# Patient Record
Sex: FEMALE | Race: WHITE | NOT HISPANIC OR LATINO | Employment: FULL TIME | URBAN - METROPOLITAN AREA
[De-identification: names, ages, dates, MRNs, and addresses within clinical notes are randomized per-mention and may not be internally consistent; named-entity substitution may affect disease eponyms.]

---

## 2018-02-17 PROBLEM — Z00.00 WELL ADULT EXAM: Status: ACTIVE | Noted: 2018-02-17

## 2018-08-20 PROBLEM — G43.119 CLASSICAL MIGRAINE WITH INTRACTABLE MIGRAINE: Status: RESOLVED | Noted: 2017-05-23 | Resolved: 2018-08-20

## 2018-10-01 PROBLEM — M25.512 ACUTE PAIN OF BOTH SHOULDERS: Status: ACTIVE | Noted: 2018-10-01

## 2019-02-05 PROBLEM — L30.4 INTERTRIGO: Status: RESOLVED | Noted: 2018-03-28 | Resolved: 2019-02-05

## 2019-02-05 PROBLEM — F41.9 ANXIETY: Status: ACTIVE | Noted: 2019-02-05

## 2019-11-11 PROBLEM — M25.512 ACUTE PAIN OF BOTH SHOULDERS: Status: RESOLVED | Noted: 2018-10-01 | Resolved: 2019-11-11

## 2019-11-11 PROBLEM — F07.81 POST CONCUSSION SYNDROME: Status: ACTIVE | Noted: 2019-11-11

## 2022-09-10 ENCOUNTER — OFFICE VISIT (OUTPATIENT)
Dept: URGENT CARE | Facility: CLINIC | Age: 42
End: 2022-09-10
Payer: COMMERCIAL

## 2022-09-10 ENCOUNTER — HOSPITAL ENCOUNTER (EMERGENCY)
Facility: HOSPITAL | Age: 42
Discharge: HOME | End: 2022-09-10
Attending: FAMILY MEDICINE | Admitting: FAMILY MEDICINE
Payer: COMMERCIAL

## 2022-09-10 VITALS
OXYGEN SATURATION: 97 % | DIASTOLIC BLOOD PRESSURE: 69 MMHG | SYSTOLIC BLOOD PRESSURE: 134 MMHG | BODY MASS INDEX: 41.65 KG/M2 | HEIGHT: 65 IN | HEART RATE: 85 BPM | RESPIRATION RATE: 18 BRPM | TEMPERATURE: 98 F | WEIGHT: 250 LBS

## 2022-09-10 VITALS
HEART RATE: 69 BPM | DIASTOLIC BLOOD PRESSURE: 66 MMHG | SYSTOLIC BLOOD PRESSURE: 133 MMHG | RESPIRATION RATE: 16 BRPM | OXYGEN SATURATION: 96 %

## 2022-09-10 DIAGNOSIS — M79.604 RIGHT LEG PAIN: Primary | ICD-10-CM

## 2022-09-10 DIAGNOSIS — M79.604 PAIN AND SWELLING OF LOWER EXTREMITY, RIGHT: Primary | ICD-10-CM

## 2022-09-10 DIAGNOSIS — M79.89 PAIN AND SWELLING OF LOWER EXTREMITY, RIGHT: Primary | ICD-10-CM

## 2022-09-10 PROCEDURE — 99283 EMERGENCY DEPT VISIT LOW MDM: CPT

## 2022-09-10 PROCEDURE — 99283 EMERGENCY DEPT VISIT LOW MDM: CPT | Performed by: PHYSICIAN ASSISTANT

## 2022-09-10 PROCEDURE — G0382 LEV 3 HOSP TYPE B ED VISIT: HCPCS | Performed by: PHYSICIAN ASSISTANT

## 2022-09-10 PROCEDURE — S9083 URGENT CARE CENTER GLOBAL: HCPCS | Performed by: PHYSICIAN ASSISTANT

## 2022-09-10 RX ORDER — ACETAMINOPHEN 325 MG/1
TABLET ORAL
Status: DISPENSED
Start: 2022-09-10 | End: 2022-09-10

## 2022-09-10 NOTE — PROGRESS NOTES
[facility]        NAME: Yeimi Cardoso is a 38 y o  female  : 1980    MRN: [id number]  DATE: September 10, 2022  TIME: 12:29 PM    Assessment and Plan   Pain and swelling of lower extremity, right [M79 604, M79 89]  1  Pain and swelling of lower extremity, right         VISIT WAS COMPLETED DURING NETWORK OUTAGE  ALL PATIENT INSTRUCTIONS WERE HAND WRITTEN AND GIVEN TO PATIENT  COPIES WILL BE SCANNED INTO THE CHART  Patient Instructions       Follow up with PCP in 3-5 days  Proceed to  ER    Chief Complaint     Chief Complaint   Patient presents with    Foot Pain     Rt foot pain, swelling, redness, no known injury, x4 days         History of Present Illness       Reports 4 day history of worsening R foot/ankle swelling, redness, and pain worse with walking that radiates to the calf  She has been applying ice and taking tylenol and ibuprofen  Denies injury, cuts, or other breaks to the skin  Denies fevers or chills  Denies surgery in past 4 weeks, immobilization over 3 days, hemoptysis, prior DVT/PE, clotting disorder, estrogen supplements, hormonal birth control, or current CA diagnosis/tx  Denies PMH of gout  FH: gout  Review of Systems   Review of Systems   Constitutional: Negative for chills and fever  Musculoskeletal: Positive for gait problem (painful) and joint swelling  Skin: Positive for color change           Current Medications       Current Outpatient Medications:     Brexpiprazole (Rexulti) 1 MG tablet, Take 1 tablet (1 mg total) by mouth daily, Disp: 30 tablet, Rfl: 1    escitalopram (Lexapro) 20 mg tablet, Take 1 tablet (20 mg total) by mouth daily, Disp: 90 tablet, Rfl: 0    lisinopril (ZESTRIL) 10 mg tablet, take 1 tablet by mouth once daily, Disp: 90 tablet, Rfl: 1    omeprazole (PriLOSEC) 20 mg delayed release capsule, take 1 capsule by mouth once daily, Disp: 30 capsule, Rfl: 5    albuterol (Ventolin HFA) 90 mcg/act inhaler, Inhale 2 puffs every 6 (six) hours as needed for wheezing (Patient not taking: No sig reported), Disp: 18 g, Rfl: 5    ibuprofen (MOTRIN) 600 mg tablet, Take 1 tablet (600 mg total) by mouth every 6 (six) hours as needed for mild pain for up to 10 days (Patient not taking: Reported on 2022), Disp: 30 tablet, Rfl: 0    methocarbamol (ROBAXIN) 500 mg tablet, Take 1 tablet (500 mg total) by mouth 2 (two) times a day as needed for muscle spasms (Patient not taking: No sig reported), Disp: 20 tablet, Rfl: 0    naproxen (Naprosyn) 500 mg tablet, Take 1 tablet (500 mg total) by mouth 2 (two) times a day with meals (Patient not taking: No sig reported), Disp: 30 tablet, Rfl: 0    ondansetron (ZOFRAN) 4 mg tablet, Take 1 tablet (4 mg total) by mouth every 6 (six) hours (Patient not taking: No sig reported), Disp: 12 tablet, Rfl: 0    psyllium (METAMUCIL SMOOTH TEXTURE) 28 % packet, Take 1 packet by mouth 2 (two) times a day (Patient not taking: Reported on 2022), Disp: 60 packet, Rfl: 1    traZODone (DESYREL) 50 mg tablet, Take 50 mg by mouth daily at bedtime (Patient not taking: No sig reported), Disp: , Rfl:     Current Allergies     Allergies as of 09/10/2022    (No Known Allergies)            The following portions of the patient's history were reviewed and updated as appropriate: allergies, current medications, past family history, past medical history, past social history, past surgical history and problem list      Past Medical History:   Diagnosis Date    Anxiety     Asthma     Complete spontaneous  without complication     FIVE TIMES    Concussion     Depression     Headache     Hypertension     Insomnia     Normal delivery     DAUGHTER    Normal delivery     SON    Pelvic inflammatory disease     RESOLVED: 16    Post concussion syndrome 2019    Recurrent pregnancy loss (CODE)     Thyroid nodule 3/2/2019       Past Surgical History:   Procedure Laterality Date    CHOLECYSTECTOMY      CYSTOSCOPY N/A 12/28/2016    Procedure: Mohini Mixer;  Surgeon: Ina Lopez MD;  [facility];  Service:     HYSTERECTOMY      LAPAROSCOPIC CHOLECYSTECTOMY      RESOLVED: 2006    NASAL SEPTUM SURGERY      REESOLVED: 1999; DEVIATION    AK LAPAROSCOPY W TOT HYSTERECT UTERUS 250 GRAM OR LESS N/A 12/28/2016    Procedure: HYSTERECTOMY LAPAROSCOPIC TOTAL ;  Surgeon: Ina Lopez MD;  [facility];  Service: Gynecology    SALPINGECTOMY Bilateral 12/28/2016    Procedure: SALPINGECTOMY;  Surgeon: Ina Lopez MD;  [facility];  Service:     TONSILLECTOMY      RESOLVED: 1997    TUBAL LIGATION      RESOLVED: 1/22/14       Family History   Problem Relation Age of Onset    Venous thrombosis Mother         ACUTE; OF DEEP VESSELS OF THE DISTAL LOWER EXTREMITY    COPD Mother     Heart disease Mother     Hypertension Mother     Mental illness Mother     Kidney disease Maternal Grandmother     Breast cancer Paternal Grandmother 36    Diabetes Paternal Grandfather         MELLITUS    Colon cancer Father 61    No Known Problems Daughter     No Known Problems Maternal Grandfather     No Known Problems Maternal Aunt     No Known Problems Maternal Aunt     No Known Problems Son          Medications have been verified  Objective   /69   Pulse 85   Temp 98 °F (36 7 °C)   Resp 18   Ht 5' 5" (1 651 m)   Wt 113 kg (250 lb)   LMP 12/01/2016   SpO2 97%   BMI 41 60 kg/m²   Patient's last menstrual period was 12/01/2016  Physical Exam     Physical Exam  Vitals reviewed  Constitutional:       General: She is not in acute distress  Appearance: She is well-developed  Cardiovascular:      Rate and Rhythm: Normal rate and regular rhythm  Heart sounds: Normal heart sounds  No murmur heard  No friction rub  No gallop  Pulmonary:      Effort: Pulmonary effort is normal  No respiratory distress  Breath sounds: Normal breath sounds  No wheezing or rales     Chest:      Chest wall: No tenderness  Musculoskeletal:         General: Swelling and tenderness present  No deformity  Normal range of motion  Comments: Diffuse R foot/ankle swelling, redness and TTP  R calf TTP without erythema or swelling  Skin:     General: Skin is warm  Findings: Erythema present  No lesion  Neurological:      Mental Status: She is alert and oriented to person, place, and time  Deep Tendon Reflexes: Reflexes are normal and symmetric  Psychiatric:         Behavior: Behavior normal          Thought Content:  Thought content normal          Judgment: Judgment normal

## 2022-09-20 ENCOUNTER — TELEPHONE (OUTPATIENT)
Dept: INTERNAL MEDICINE CLINIC | Facility: CLINIC | Age: 42
End: 2022-09-20

## 2022-09-20 DIAGNOSIS — B34.9 VIRAL INFECTION, UNSPECIFIED: ICD-10-CM

## 2022-09-20 DIAGNOSIS — Z20.822 EXPOSURE TO COVID-19 VIRUS: ICD-10-CM

## 2022-09-20 NOTE — TELEPHONE ENCOUNTER
Called pt to have her come in for covid swab and get her put on the nurse schedule  No answer  LM for her to call back and come in today for swab

## 2022-09-20 NOTE — TELEPHONE ENCOUNTER
Regarding: FW: Covid19  Please place pt on nurse schedule and let her know she can come today and call from parking lot, thanks  ----- Message -----  From: Shayla Salmeron  Sent: 9/20/2022  11:10 AM EDT  To: Beatriz Miner PA-C  Subject: [id number]                                          ----- Message from Shayla Salmeron sent at 9/20/2022 11:10 AM EDT -----       ----- Message from Alejandra Rebolledo to Beatriz Miner PA-C sent at 9/20/2022 11:09 AM -----   Yes I would   can I come in any time      ----- Message -----       From:Reba Mckinley PA-C       Sent:9/20/2022 10:16 AM EDT         To:Alejandra Cope    Subject:Covid19    Eamon Mosqueda  We recommend coming to our office for the testing instead of the urgent care, would you be agreeable to that?       ----- Message -----       From:Alejandra Cope       Sent:9/19/2022  6:45 PM EDT         To:Reba Mckinley PA-C    Subject:Covid19    I haven't been feeling that great for a few days   sore throat very tired and I was around my mom  She just tested positive today for covid  Can u put an order in for me to get tested so I can go to the walk in and get tested?

## 2022-09-21 LAB — SARS-COV-2 RNA RESP QL NAA+PROBE: NEGATIVE

## 2022-09-21 PROCEDURE — U0003 INFECTIOUS AGENT DETECTION BY NUCLEIC ACID (DNA OR RNA); SEVERE ACUTE RESPIRATORY SYNDROME CORONAVIRUS 2 (SARS-COV-2) (CORONAVIRUS DISEASE [COVID-19]), AMPLIFIED PROBE TECHNIQUE, MAKING USE OF HIGH THROUGHPUT TECHNOLOGIES AS DESCRIBED BY CMS-2020-01-R: HCPCS | Performed by: PHYSICIAN ASSISTANT

## 2022-09-21 PROCEDURE — U0005 INFEC AGEN DETEC AMPLI PROBE: HCPCS | Performed by: PHYSICIAN ASSISTANT

## 2022-10-24 ENCOUNTER — HOSPITAL ENCOUNTER (EMERGENCY)
Facility: HOSPITAL | Age: 42
Discharge: HOME | End: 2022-10-24
Attending: EMERGENCY MEDICINE
Payer: COMMERCIAL

## 2022-10-24 ENCOUNTER — APPOINTMENT (EMERGENCY)
Dept: RADIOLOGY | Facility: HOSPITAL | Age: 42
End: 2022-10-24
Payer: COMMERCIAL

## 2022-10-24 ENCOUNTER — APPOINTMENT (EMERGENCY)
Dept: NON INVASIVE DIAGNOSTICS | Facility: HOSPITAL | Age: 42
End: 2022-10-24
Payer: COMMERCIAL

## 2022-10-24 VITALS
SYSTOLIC BLOOD PRESSURE: 158 MMHG | RESPIRATION RATE: 18 BRPM | OXYGEN SATURATION: 98 % | DIASTOLIC BLOOD PRESSURE: 82 MMHG | HEART RATE: 76 BPM | TEMPERATURE: 97.9 F | WEIGHT: 250 LBS | HEIGHT: 65 IN | BODY MASS INDEX: 41.65 KG/M2

## 2022-10-24 DIAGNOSIS — M25.561 ACUTE PAIN OF RIGHT KNEE: Primary | ICD-10-CM

## 2022-10-24 PROCEDURE — 73564 X-RAY EXAM KNEE 4 OR MORE: CPT

## 2022-10-24 PROCEDURE — 93971 EXTREMITY STUDY: CPT

## 2022-10-24 PROCEDURE — 93971 EXTREMITY STUDY: CPT | Performed by: SURGERY

## 2022-10-24 NOTE — ED PROVIDER NOTES
History  Chief Complaint   Patient presents with   • Leg Pain     Pt reports ongoing leg pain for 2 days     39 yo F presents to the ED for right leg pain that has been worsening over the past 3d  She states that she injured it at work 3 days ago when lifting a resident  She works as a CNA at a nursing home  She doesn't remember exactly what happened, but remembers that her knee started hurting after lifting the patient  She has never inured that knee before  She has tried tylenol and motrin for the pain with little to no relief  No hx of blood clots  She states that the pain worsens with knee flexion and extension  She is able to ambulate but with a limp  She offers no other complaints at this time  Denies chest pain, SOB, abd pain, bladder/bowel incontinence, back pain, dysuria, headache, dizziness  She localizes her pain to the back of her right knee and describes it as a "cramping" sensation  History provided by:  Patient   used: No    Leg Pain  Location:  Knee  Time since incident:  3 days  Knee location:  R knee  Pain details:     Quality:  Aching and cramping    Radiates to:  Does not radiate    Severity:  Moderate    Onset quality:  Sudden    Duration:  3 days    Timing:  Constant    Progression:  Worsening  Chronicity:  New  Dislocation: no    Foreign body present:  No foreign bodies  Prior injury to area:  No  Relieved by:  Nothing  Worsened by:  Nothing  Ineffective treatments:  Acetaminophen  Associated symptoms: no back pain, no decreased ROM, no fever, no numbness, no swelling and no tingling        Prior to Admission Medications   Prescriptions Last Dose Informant Patient Reported? Taking?    Brexpiprazole (Rexulti) 1 MG tablet   No No   Sig: Take 1 tablet (1 mg total) by mouth daily   albuterol (Ventolin HFA) 90 mcg/act inhaler   No No   Sig: Inhale 2 puffs every 6 (six) hours as needed for wheezing   Patient not taking: No sig reported   escitalopram (Lexapro) 20 mg tablet No No   Sig: Take 1 tablet (20 mg total) by mouth daily   ibuprofen (MOTRIN) 600 mg tablet   No No   Sig: Take 1 tablet (600 mg total) by mouth every 6 (six) hours as needed for mild pain for up to 10 days   Patient not taking: Reported on 2022   lisinopril (ZESTRIL) 10 mg tablet   No No   Sig: take 1 tablet by mouth once daily   methocarbamol (ROBAXIN) 500 mg tablet   No No   Sig: Take 1 tablet (500 mg total) by mouth 2 (two) times a day as needed for muscle spasms   Patient not taking: No sig reported   naproxen (Naprosyn) 500 mg tablet   No No   Sig: Take 1 tablet (500 mg total) by mouth 2 (two) times a day with meals   Patient not taking: No sig reported   omeprazole (PriLOSEC) 20 mg delayed release capsule   No No   Sig: take 1 capsule by mouth once daily   ondansetron (ZOFRAN) 4 mg tablet   No No   Sig: Take 1 tablet (4 mg total) by mouth every 6 (six) hours   Patient not taking: No sig reported   psyllium (METAMUCIL SMOOTH TEXTURE) 28 % packet   No No   Sig: Take 1 packet by mouth 2 (two) times a day   Patient not taking: Reported on 2022   traZODone (DESYREL) 50 mg tablet   Yes No   Sig: Take 50 mg by mouth daily at bedtime   Patient not taking: No sig reported      Facility-Administered Medications: None       Past Medical History:   Diagnosis Date   • Anxiety    • Asthma    • Complete spontaneous  without complication     FIVE TIMES   • Concussion    • Depression    • Headache    • Hypertension    • Insomnia    • Normal delivery     DAUGHTER   • Normal delivery     SON   • Pelvic inflammatory disease     RESOLVED: 16   • Post concussion syndrome 2019   • Recurrent pregnancy loss (CODE)    • Thyroid nodule 3/2/2019       Past Surgical History:   Procedure Laterality Date   • CHOLECYSTECTOMY     • CYSTOSCOPY N/A 2016    Procedure: CYSTOSCOPY;  Surgeon:  Iliana Rosenbaum MD;  [facility];  Service:    • HYSTERECTOMY     • LAPAROSCOPIC CHOLECYSTECTOMY      RESOLVED:    • NASAL SEPTUM SURGERY      REESOLVED: 1999; DEVIATION   • DE LAPAROSCOPY W TOT HYSTERECT UTERUS 250 GRAM OR LESS N/A 12/28/2016    Procedure: HYSTERECTOMY LAPAROSCOPIC TOTAL ;  Surgeon: Sally Riley MD;  [facility];  Service: Gynecology   • SALPINGECTOMY Bilateral 12/28/2016    Procedure: SALPINGECTOMY;  Surgeon: Sally Riley MD;  [facility];  Service:    • TONSILLECTOMY      RESOLVED: 1997   • TUBAL LIGATION      RESOLVED: 1/22/14       Family History   Problem Relation Age of Onset   • Venous thrombosis Mother         ACUTE; OF DEEP VESSELS OF THE DISTAL LOWER EXTREMITY   • COPD Mother    • Heart disease Mother    • Hypertension Mother    • Mental illness Mother    • Kidney disease Maternal Grandmother    • Breast cancer Paternal Grandmother 36   • Diabetes Paternal Grandfather         MELLITUS   • Colon cancer Father 61   • No Known Problems Daughter    • No Known Problems Maternal Grandfather    • No Known Problems Maternal Aunt    • No Known Problems Maternal Aunt    • No Known Problems Son      I have reviewed and agree with the history as documented  E-Cigarette/Vaping   • E-Cigarette Use Never User      E-Cigarette/Vaping Substances   • Nicotine No    • THC No    • CBD No    • Flavoring No    • Other No    • Unknown No      Social History     Tobacco Use   • Smoking status: Former Smoker     Types: Cigars   • Smokeless tobacco: Never Used   Vaping Use   • Vaping Use: Never used   Substance Use Topics   • Alcohol use: Yes     Comment: socially   • Drug use: No       Review of Systems   Constitutional: Negative for chills and fever  HENT: Negative for ear pain and sore throat  Eyes: Negative for pain and visual disturbance  Respiratory: Negative for cough and shortness of breath  Cardiovascular: Negative for chest pain and palpitations  Gastrointestinal: Negative for abdominal pain and vomiting  Genitourinary: Negative for dysuria and hematuria     Musculoskeletal: Positive for arthralgias  Negative for back pain  Skin: Negative for color change and rash  Neurological: Negative for seizures and syncope  All other systems reviewed and are negative  Physical Exam  Physical Exam  Vitals and nursing note reviewed  Constitutional:       General: She is not in acute distress  Appearance: Normal appearance  She is well-developed and normal weight  HENT:      Head: Normocephalic and atraumatic  Right Ear: External ear normal       Left Ear: External ear normal       Nose: Nose normal       Mouth/Throat:      Mouth: Mucous membranes are moist       Pharynx: Oropharynx is clear  Eyes:      General: No scleral icterus  Right eye: No discharge  Left eye: No discharge  Conjunctiva/sclera: Conjunctivae normal    Cardiovascular:      Rate and Rhythm: Normal rate and regular rhythm  Pulses: Normal pulses  Heart sounds: Normal heart sounds  No murmur heard  Pulmonary:      Effort: Pulmonary effort is normal  No respiratory distress  Breath sounds: Normal breath sounds  Abdominal:      General: Abdomen is flat  Palpations: Abdomen is soft  Tenderness: There is no abdominal tenderness  There is no right CVA tenderness or left CVA tenderness  Musculoskeletal:         General: Tenderness present  Normal range of motion  Cervical back: Neck supple  Right lower leg: No edema  Left lower leg: No edema  Comments: TTP over the posterior aspect of R knee  No bruising or signs of trauma  No significant swelling to the RLE  No deformity noted  Passive ROM Wnl  Neurovascularly intact proximal and distal to the affected site  Skin:     General: Skin is warm and dry  Neurological:      General: No focal deficit present  Mental Status: She is alert and oriented to person, place, and time  Mental status is at baseline  Motor: No weakness     Psychiatric:         Mood and Affect: Mood normal          Behavior: Behavior normal          Thought Content: Thought content normal          Vital Signs  ED Triage Vitals [10/24/22 1029]   Temperature Pulse Respirations Blood Pressure SpO2   97 9 °F (36 6 °C) 76 18 158/82 99 %      Temp src Heart Rate Source Patient Position - Orthostatic VS BP Location FiO2 (%)   -- Monitor Lying Right arm --      Pain Score       8           Vitals:    10/24/22 1029   BP: 158/82   Pulse: 76   Patient Position - Orthostatic VS: Lying         Visual Acuity      ED Medications  Medications - No data to display    Diagnostic Studies  Results Reviewed     None                 VAS lower limb venous duplex study, unilateral/limited   Final Result by Yesenia Suárez MD (10/24 1401)      XR knee 4+ views Right injury   ED Interpretation by Rosa Georges PA-C (10/24 1235)   No acute osseous abnormality      Final Result by Cristina Zavala MD (10/24 1418)      No acute osseous abnormality  Findings are stable      Workstation performed: [id number]                    Procedures  Procedures         ED Course                                             MDM  Number of Diagnoses or Management Options  Acute pain of right knee: new and requires workup  Diagnosis management comments: 37 yo F presents for evaluation of Right knee pain that has worsened over the past 3 days  TTP over the back of R knee  No appreciable swelling or signs of trauma  Duplex ordered to r/o DVT with questionable trauma/injury to R knee  XR shows no acute osseous abnormality  Duplex negative for DVT  Likely contusion vs ligament injury  Will give knee immobilizer and crutches and recommend weight bearing as tolerated  Gave referral for ortho  Patient verbalizes understanding and is amenable to discharge  Strict return precautions discussed  Delay in care due to critical case in the ED          Amount and/or Complexity of Data Reviewed  Tests in the radiology section of CPT®: ordered and reviewed  Review and summarize past medical records: yes  Independent visualization of images, tracings, or specimens: yes    Patient Progress  Patient progress: stable      Disposition  Final diagnoses:   Acute pain of right knee     Time reflects when diagnosis was documented in both MDM as applicable and the Disposition within this note     Time User Action Codes Description Comment    10/24/2022 12:59 PM Mia Rosario Add [A05 [id number]] Acute pain of right knee       ED Disposition     ED Disposition   Discharge    Condition   Stable    Date/Time   Mon Oct 24, 2022 12:59 PM    Comment   Alejandra Cope discharge to home/self care                 Follow-up Information     Follow up With Specialties Details Why Contact Info Additional Information    Kenyetta Nichols PA-C Family Medicine, Internal Medicine, Physician Assistant Call in 3 days follow up for further evaluation of symptoms [address] Emergency Department Emergency Medicine Go to  If symptoms worsen [address] [phone]  [facility] Emergency Department, [address], [facility] Specialists Rafael calvin Orthopedic Surgery Schedule an appointment as soon as possible for a visit  follow up for further evaluation of symptoms [address] [facility] [address] [place] [id number]  [address] [place] [address] Specialists Rafael calvin, [address], [place], [place] [address]          Discharge Medication List as of 10/24/2022  1:01 PM      CONTINUE these medications which have NOT CHANGED    Details   albuterol (Ventolin HFA) 90 mcg/act inhaler Inhale 2 puffs every 6 (six) hours as needed for wheezing, Starting Wed 12/22/2021, Normal      Brexpiprazole (Rexulti) 1 MG tablet Take 1 tablet (1 mg total) by mouth daily, Starting ANTHONY Garrett 8/24/2022, Normal      escitalopram (Lexapro) 20 mg tablet Take 1 tablet (20 mg total) by mouth daily, Starting Wed 4/13/2022, Normal      ibuprofen (MOTRIN) 600 mg tablet Take 1 tablet (600 mg total) by mouth every 6 (six) hours as needed for mild pain for up to 10 days, Starting Mon 6/14/2021, Until Fri 4/1/2022 at 2359, Normal      lisinopril (ZESTRIL) 10 mg tablet take 1 tablet by mouth once daily, Normal      methocarbamol (ROBAXIN) 500 mg tablet Take 1 tablet (500 mg total) by mouth 2 (two) times a day as needed for muscle spasms, Starting Mon 7/11/2022, Normal      naproxen (Naprosyn) 500 mg tablet Take 1 tablet (500 mg total) by mouth 2 (two) times a day with meals, Starting Mon 7/11/2022, Normal      omeprazole (PriLOSEC) 20 mg delayed release capsule take 1 capsule by mouth once daily, Normal      ondansetron (ZOFRAN) 4 mg tablet Take 1 tablet (4 mg total) by mouth every 6 (six) hours, Starting Mon 4/25/2022, Normal      psyllium (METAMUCIL SMOOTH TEXTURE) 28 % packet Take 1 packet by mouth 2 (two) times a day, Starting Thu 3/10/2022, Until Sat 4/9/2022, Normal      traZODone (DESYREL) 50 mg tablet Take 50 mg by mouth daily at bedtime, Historical Med             No discharge procedures on file      PDMP Review     None          ED Provider  Electronically Signed by           Vincent Gutierrez PA-C  10/24/22 5025

## 2022-10-24 NOTE — Clinical Note
Patel Pederson was seen and treated in our emergency department on 10/24/2022  Diagnosis:     Alejandra  may return to work on return date, may return to school on return date  She may return on this date: 10/25/2022    Pt is required to be non-weightbearing until re-evaluation     If you have any questions or concerns, please don't hesitate to call        Josue Silva PA-C    ______________________________           _______________          _______________  Hospital Representative                              Date                                Time

## 2022-10-25 ENCOUNTER — APPOINTMENT (OUTPATIENT)
Dept: URGENT CARE | Facility: CLINIC | Age: 42
End: 2022-10-25
Payer: OTHER MISCELLANEOUS

## 2022-10-25 PROCEDURE — 99213 OFFICE O/P EST LOW 20 MIN: CPT

## 2022-10-31 ENCOUNTER — OCCMED (OUTPATIENT)
Dept: URGENT CARE | Facility: CLINIC | Age: 42
End: 2022-10-31

## 2022-10-31 DIAGNOSIS — M25.561 ACUTE PAIN OF RIGHT KNEE: Primary | ICD-10-CM

## 2022-11-06 DIAGNOSIS — I10 ESSENTIAL HYPERTENSION: ICD-10-CM

## 2022-11-07 RX ORDER — LISINOPRIL 10 MG/1
10 TABLET ORAL DAILY
Qty: 90 TABLET | Refills: 0 | Status: SHIPPED | OUTPATIENT
Start: 2022-11-07 | End: 2022-11-16

## 2022-11-09 ENCOUNTER — APPOINTMENT (OUTPATIENT)
Dept: URGENT CARE | Facility: CLINIC | Age: 42
End: 2022-11-09

## 2022-11-14 ENCOUNTER — OFFICE VISIT (OUTPATIENT)
Dept: OBGYN CLINIC | Facility: CLINIC | Age: 42
End: 2022-11-14

## 2022-11-14 VITALS
WEIGHT: 260 LBS | BODY MASS INDEX: 43.32 KG/M2 | HEIGHT: 65 IN | SYSTOLIC BLOOD PRESSURE: 132 MMHG | HEART RATE: 69 BPM | DIASTOLIC BLOOD PRESSURE: 83 MMHG

## 2022-11-14 DIAGNOSIS — S76.311A HAMSTRING STRAIN, RIGHT, INITIAL ENCOUNTER: Primary | ICD-10-CM

## 2022-11-14 NOTE — LETTER
November 14, 2022     Patient: Jerson Walker  YOB: 1980  Date of Visit: 11/14/2022      To Whom it May Concern:    Daria Harris is under my professional care  Alejandra was seen in my office on 11/14/2022  Alejandra is cleared to return to work related responsibilities beginning 11/14/2022 with the following restrictions:    Light duty work restrictions will remain in place  Limit use of stairways, step stools, ladders, or other level changing equipment  No transfer of patients    Will be re-evaluated in 4 weeks time, any new restrictions will be outlined at that time    If you have any questions or concerns, please don't hesitate to call           Sincerely,          Rama Mendoza DO        CC: No Recipients

## 2022-11-14 NOTE — PROGRESS NOTES
Assessment/Plan:   Diagnoses and all orders for this visit:    Hamstring strain, right, initial encounter  -     Ambulatory referral to Physical Therapy; Future    Discussed with patient that today's physical exam and review of recent x-rays and MRI are consistent with medial hamstring strain  Discussed with patient that she will likely benefit from formal physical therapy to work on pain reduction, strengthening, and stretching  A referral has been provided to this effect  She can continue Tylenol OTC and Voltaren gel as needed for pain and soreness  She can continue light duty work restriction, a note has been provided to this effect  She will be seen for follow-up in 4 weeks for re-evaluation  Patient expresses understanding is in agreement with this treatment plan  Clinically, the patient has a medial side hamstring strain of her right knee  The MRI was negative  Would recommend initiation of therapy for range of motion, strengthening, stretching  She may work with restrictions  Follow up 1 month  Subjective:   Patient ID: Anel Cazares  1980     HPI  Patient is a 38 y o  female who presents for initial evaluation of work related right knee injury sustained 10/21/2022  Patient currently is employed by a [employer], and states that 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  She states that her weight was beared through her right lower extremity, when she felt/heard a pop and had mild pain in the posterior aspect of the knee  She was able to complete her tasks at that time, but she says that an hour later, she began experiencing more intense pain in the posterior aspect of the right knee  She was evaluated a few days later at her local [facility] ED on 10/24/2022, and was seen by occupational medicine on 10/31/2022  Aside from knees instances, she has had no formal treatment in regards to this issue    On today's presentation she describes her pain as bowling mostly posterior and medial   She describes her pain as a feeling of tightness/burning pain  She reports only mild swelling, but denies any associated bruising, numbness, tingling, or feelings of instability  Her pain is increased with prolonged weight-bearing  She is currently taking Tylenol for pain, and is using Voltaren gel for pain relief as well  Patient does have a past medical history which includes right knee strain from 2021  The following portions of the patient's history were reviewed and updated as appropriate:  Past medical history, past surgical history, Family history, social history, current medications and allergies    Past Medical History:   Diagnosis Date   • Anxiety    • Asthma    • Complete spontaneous  without complication     FIVE TIMES   • Concussion    • Depression    • Headache    • Hypertension    • Insomnia    • Normal delivery     DAUGHTER   • Normal delivery     SON   • Pelvic inflammatory disease     RESOLVED: 16   • Post concussion syndrome 2019   • Recurrent pregnancy loss (CODE)    • Thyroid nodule 3/2/2019       Past Surgical History:   Procedure Laterality Date   • CHOLECYSTECTOMY     • CYSTOSCOPY N/A 2016    Procedure: CYSTOSCOPY;  Surgeon: Melissa Alba MD;  [facility];  Service:    • HYSTERECTOMY     • LAPAROSCOPIC CHOLECYSTECTOMY      RESOLVED:    • NASAL SEPTUM SURGERY      REESOLVED: ; DEVIATION   • TX LAPAROSCOPY W TOT HYSTERECT UTERUS 250 GRAM OR LESS N/A 2016    Procedure: HYSTERECTOMY LAPAROSCOPIC TOTAL ;  Surgeon: Melissa Alba MD;  [facility];  Service: Gynecology   • SALPINGECTOMY Bilateral 2016    Procedure: SALPINGECTOMY;  Surgeon:  Melissa Alba MD;  [facility];  Service:    • TONSILLECTOMY      RESOLVED:    • TUBAL LIGATION      RESOLVED: 14       Family History   Problem Relation Age of Onset   • Venous thrombosis Mother         ACUTE; OF DEEP VESSELS OF THE DISTAL LOWER EXTREMITY   • COPD Mother    • Heart disease Mother    • Hypertension Mother    • Mental illness Mother    • Kidney disease Maternal Grandmother    • Breast cancer Paternal Grandmother 36   • Diabetes Paternal Grandfather         MELLITUS   • Colon cancer Father 61   • No Known Problems Daughter    • No Known Problems Maternal Grandfather    • No Known Problems Maternal Aunt    • No Known Problems Maternal Aunt    • No Known Problems Son        Social History     Socioeconomic History   • Marital status: /Civil Union     Spouse name: None   • Number of children: None   • Years of education: None   • Highest education level: None   Occupational History   • Occupation: employed   Tobacco Use   • Smoking status: Former Smoker     Types: Cigars   • Smokeless tobacco: Never Used   Vaping Use   • Vaping Use: Never used   Substance and Sexual Activity   • Alcohol use: Yes     Comment: socially   • Drug use: No   • Sexual activity: Yes     Partners: Male     Birth control/protection: None   Other Topics Concern   • None   Social History Narrative    [employer] USE        Employed Full Time- [employer] -      Social Determinants of Health     Financial Resource Strain: Not on file   Food Insecurity: Not on file   Transportation Needs: Not on file   Physical Activity: Not on file   Stress: Not on file   Social Connections: Not on file   Intimate Partner Violence: Not on file   Housing Stability: Not on file         Current Outpatient Medications:   •  albuterol (Ventolin HFA) 90 mcg/act inhaler, Inhale 2 puffs every 6 (six) hours as needed for wheezing, Disp: 18 g, Rfl: 5  •  Brexpiprazole (Rexulti) 1 MG tablet, Take 1 tablet (1 mg total) by mouth daily, Disp: 30 tablet, Rfl: 1  •  escitalopram (Lexapro) 20 mg tablet, Take 1 tablet (20 mg total) by mouth daily, Disp: 90 tablet, Rfl: 0  •  lisinopril (ZESTRIL) 10 mg tablet, take 1 tablet by mouth once daily, Disp: 30 tablet, Rfl: 0  •  omeprazole (PriLOSEC) 20 mg delayed release capsule, take 1 capsule by mouth once daily, Disp: 30 capsule, Rfl: 5  •  lisinopril (ZESTRIL) 10 mg tablet, Take 1 tablet (10 mg total) by mouth daily (Patient not taking: Reported on 11/14/2022), Disp: 90 tablet, Rfl: 0  •  methocarbamol (ROBAXIN) 500 mg tablet, Take 1 tablet (500 mg total) by mouth 2 (two) times a day as needed for muscle spasms (Patient not taking: No sig reported), Disp: 20 tablet, Rfl: 0  •  naproxen (Naprosyn) 500 mg tablet, Take 1 tablet (500 mg total) by mouth 2 (two) times a day with meals (Patient not taking: No sig reported), Disp: 30 tablet, Rfl: 0  •  ondansetron (ZOFRAN) 4 mg tablet, Take 1 tablet (4 mg total) by mouth every 6 (six) hours (Patient not taking: No sig reported), Disp: 12 tablet, Rfl: 0  •  psyllium (METAMUCIL SMOOTH TEXTURE) 28 % packet, Take 1 packet by mouth 2 (two) times a day (Patient not taking: Reported on 8/17/2022), Disp: 60 packet, Rfl: 1  •  traZODone (DESYREL) 50 mg tablet, Take 50 mg by mouth daily at bedtime (Patient not taking: No sig reported), Disp: , Rfl:     No Known Allergies    Review of Systems   Constitutional: Negative for chills, fever and unexpected weight change  HENT: Negative for hearing loss, nosebleeds and sore throat  Eyes: Negative for pain, redness and visual disturbance  Respiratory: Negative for cough, shortness of breath and wheezing  Cardiovascular: Negative for chest pain, palpitations and leg swelling  Gastrointestinal: Negative for abdominal pain, nausea and vomiting  Endocrine: Negative for polydipsia and polyuria  Genitourinary: Negative for dysuria and hematuria  Musculoskeletal:        As noted in HPI   Skin: Negative for rash and wound  Neurological: Negative for dizziness, numbness and headaches  Psychiatric/Behavioral: Negative for decreased concentration and suicidal ideas  The patient is not nervous/anxious           Objective:  /83 Pulse 69   Ht 5' 5" (1 651 m)   Wt 118 kg (260 lb)   LMP 12/01/2016   BMI 43 27 kg/m²     Ortho Exam  Right knee -   Patient ambulates with steady gait pattern  Uses no assistive device  No anatomical deformity  Skin is warm and dry to touch with no signs of erythema, ecchymosis, infection  No soft tissue swelling, no effusion noted  ROM 0°-125°  TTP over distal medial hamstring, nontender to palpation over medial or lateral joint lines  Flexor and extensor mechanisms intact  Knee is stable to varus and valgus stress  - Lachman's  - Anterior Drawer, - Posterior Drawer  - medial Oscar's, - lateral Oscar's  - Pivot Shift  Patella tracks centrally without palpable crepitus  Calf compartments are soft and supple  2+ TP and DP pulses with brisk capillary refill to the toes  Sural, saphenous, tibial, superficial and deep peroneal motor and sensory distributions intact  Sensation light touch intact distally    Physical Exam  Vitals and nursing note reviewed  Constitutional:       General: She is not in acute distress  Appearance: She is well-developed  HENT:      Head: Normocephalic and atraumatic  Eyes:      Conjunctiva/sclera: Conjunctivae normal    Cardiovascular:      Rate and Rhythm: Normal rate  Pulmonary:      Effort: Pulmonary effort is normal    Musculoskeletal:      Cervical back: Neck supple  Skin:     General: Skin is warm and dry  Capillary Refill: Capillary refill takes less than 2 seconds  Neurological:      Mental Status: She is alert and oriented to person, place, and time  Psychiatric:         Mood and Affect: Mood normal          Behavior: Behavior normal           Diagnostic Test Review:  Attending Physician has personally reviewed pertinent imaging in PACS, impression is as follows:    Review of radiographic series taken 10/24/2022 of the right knee shows no sign of acute osseous abnormality or malalignment      Review of MRI series performed 11/1/2022 of the right knee shows tendinosis of the distal medial hamstring tendons as well as small, benign parameniscal cyst    Procedures   No procedures performed this visit    Scribe Attestation    I,:  Joe Fong am acting as a scribe while in the presence of the attending physician :       I,:  Silvia Lock, DO personally performed the services described in this documentation    as scribed in my presence :

## 2022-11-16 ENCOUNTER — OFFICE VISIT (OUTPATIENT)
Dept: INTERNAL MEDICINE CLINIC | Facility: CLINIC | Age: 42
End: 2022-11-16

## 2022-11-16 VITALS
DIASTOLIC BLOOD PRESSURE: 68 MMHG | HEART RATE: 75 BPM | WEIGHT: 259.5 LBS | BODY MASS INDEX: 44.3 KG/M2 | SYSTOLIC BLOOD PRESSURE: 148 MMHG | OXYGEN SATURATION: 99 % | HEIGHT: 64 IN | TEMPERATURE: 97.7 F

## 2022-11-16 DIAGNOSIS — E66.01 CLASS 3 SEVERE OBESITY DUE TO EXCESS CALORIES WITHOUT SERIOUS COMORBIDITY WITH BODY MASS INDEX (BMI) OF 40.0 TO 44.9 IN ADULT (HCC): Primary | ICD-10-CM

## 2022-11-16 DIAGNOSIS — F51.01 PRIMARY INSOMNIA: ICD-10-CM

## 2022-11-16 DIAGNOSIS — Z12.31 SCREENING MAMMOGRAM FOR BREAST CANCER: ICD-10-CM

## 2022-11-16 DIAGNOSIS — Z13.0 SCREENING FOR DEFICIENCY ANEMIA: ICD-10-CM

## 2022-11-16 DIAGNOSIS — E55.9 VITAMIN D DEFICIENCY: ICD-10-CM

## 2022-11-16 DIAGNOSIS — Z13.29 SCREENING FOR THYROID DISORDER: ICD-10-CM

## 2022-11-16 DIAGNOSIS — Z13.1 SCREENING FOR DIABETES MELLITUS: ICD-10-CM

## 2022-11-16 DIAGNOSIS — Z23 NEED FOR VACCINATION: ICD-10-CM

## 2022-11-16 DIAGNOSIS — Z13.220 SCREENING, LIPID: ICD-10-CM

## 2022-11-16 PROBLEM — R07.9 CHEST PAIN: Status: RESOLVED | Noted: 2019-03-02 | Resolved: 2022-11-16

## 2022-11-16 PROBLEM — F33.42 RECURRENT MAJOR DEPRESSIVE DISORDER, IN FULL REMISSION (HCC): Status: RESOLVED | Noted: 2019-02-05 | Resolved: 2022-11-16

## 2022-11-16 RX ORDER — TRAZODONE HYDROCHLORIDE 50 MG/1
50 TABLET ORAL
Qty: 30 TABLET | Refills: 0 | Status: SHIPPED | OUTPATIENT
Start: 2022-11-16

## 2022-11-16 RX ORDER — BUPROPION HYDROCHLORIDE 150 MG/1
150 TABLET ORAL EVERY MORNING
Qty: 30 TABLET | Refills: 5 | Status: SHIPPED | OUTPATIENT
Start: 2022-11-16

## 2022-11-17 PROBLEM — E66.813 CLASS 3 SEVERE OBESITY DUE TO EXCESS CALORIES WITHOUT SERIOUS COMORBIDITY WITH BODY MASS INDEX (BMI) OF 40.0 TO 44.9 IN ADULT (HCC): Status: ACTIVE | Noted: 2022-11-17

## 2022-11-17 PROBLEM — E66.01 CLASS 3 SEVERE OBESITY DUE TO EXCESS CALORIES WITHOUT SERIOUS COMORBIDITY WITH BODY MASS INDEX (BMI) OF 40.0 TO 44.9 IN ADULT (HCC): Status: ACTIVE | Noted: 2022-11-17

## 2022-11-23 ENCOUNTER — APPOINTMENT (OUTPATIENT)
Dept: URGENT CARE | Facility: CLINIC | Age: 42
End: 2022-11-23

## 2022-12-05 ENCOUNTER — OFFICE VISIT (OUTPATIENT)
Dept: URGENT CARE | Facility: CLINIC | Age: 42
End: 2022-12-05

## 2022-12-05 VITALS
DIASTOLIC BLOOD PRESSURE: 84 MMHG | WEIGHT: 259 LBS | HEART RATE: 100 BPM | OXYGEN SATURATION: 98 % | HEIGHT: 64 IN | TEMPERATURE: 97.9 F | BODY MASS INDEX: 44.22 KG/M2 | RESPIRATION RATE: 16 BRPM | SYSTOLIC BLOOD PRESSURE: 128 MMHG

## 2022-12-05 DIAGNOSIS — B34.9 VIRAL SYNDROME: Primary | ICD-10-CM

## 2022-12-05 DIAGNOSIS — R05.1 ACUTE COUGH: ICD-10-CM

## 2022-12-05 NOTE — PROGRESS NOTES
[facility]        NAME: Anthony Khan is a 38 y o  female  : 1980    MRN: [id number]  DATE: 2022  TIME: 8:18 AM    Assessment and Plan   Viral syndrome [B34 9]  1  Viral syndrome        2  Acute cough  Cov/Flu-Collected at [facility]            Patient Instructions     Vitamin D3 2000 IU daily  Vitamin C 1000mg twice per day  Multivitamin daily  Fluids and rest  Over the counter cold medication as needed  Follow up with PCP in 3-5 days  Proceed to  ER if symptoms worsen  Chief Complaint     Chief Complaint   Patient presents with   • Cough     Chills , cough, headache, body aches started a week ago         History of Present Illness       URI   This is a new problem  The current episode started in the past 7 days  The problem has been gradually worsening (Saturday)  The maximum temperature recorded prior to her arrival was 100 4 - 100 9 F  Associated symptoms include congestion, coughing, headaches and a sore throat  Pertinent negatives include no abdominal pain, diarrhea, ear pain, nausea, rash, rhinorrhea, sinus pain, sneezing, vomiting or wheezing  Treatments tried: DayQuil, tylenol  Review of Systems   Review of Systems   Constitutional: Positive for chills, fatigue and fever  HENT: Positive for congestion and sore throat  Negative for ear discharge, ear pain, postnasal drip, rhinorrhea, sinus pressure, sinus pain, sneezing and trouble swallowing  Respiratory: Positive for cough  Negative for chest tightness, shortness of breath and wheezing  Gastrointestinal: Negative for abdominal pain, diarrhea, nausea and vomiting  Musculoskeletal: Positive for myalgias  Skin: Negative for rash  Neurological: Positive for headaches  Negative for light-headedness           Current Medications       Current Outpatient Medications:   •  albuterol (Ventolin HFA) 90 mcg/act inhaler, Inhale 2 puffs every 6 (six) hours as needed for wheezing, Disp: 18 g, Rfl: 5  • buPROPion (Wellbutrin XL) 150 mg 24 hr tablet, Take 1 tablet (150 mg total) by mouth every morning, Disp: 30 tablet, Rfl: 5  •  lisinopril (ZESTRIL) 10 mg tablet, take 1 tablet by mouth once daily, Disp: 30 tablet, Rfl: 0  •  omeprazole (PriLOSEC) 20 mg delayed release capsule, take 1 capsule by mouth once daily, Disp: 30 capsule, Rfl: 5  •  traZODone (DESYREL) 50 mg tablet, Take 1 tablet (50 mg total) by mouth daily at bedtime, Disp: 30 tablet, Rfl: 0    Current Allergies     Allergies as of 2022   • (No Known Allergies)            The following portions of the patient's history were reviewed and updated as appropriate: allergies, current medications, past family history, past medical history, past social history, past surgical history and problem list      Past Medical History:   Diagnosis Date   • Anxiety    • Asthma    • Complete spontaneous  without complication     FIVE TIMES   • Concussion    • Depression    • Headache    • Hypertension    • Insomnia    • Normal delivery     DAUGHTER   • Normal delivery     SON   • Pelvic inflammatory disease     RESOLVED: 16   • Post concussion syndrome 2019   • Recurrent pregnancy loss (CODE)    • Thyroid nodule 3/2/2019       Past Surgical History:   Procedure Laterality Date   • CHOLECYSTECTOMY     • CYSTOSCOPY N/A 2016    Procedure: Annamarie Prader;  Surgeon: Kathy James MD;  [facility];  Service:    • HYSTERECTOMY     • LAPAROSCOPIC CHOLECYSTECTOMY      RESOLVED:    • NASAL SEPTUM SURGERY      REESOLVED: ; DEVIATION   • TN LAPAROSCOPY W TOT HYSTERECT UTERUS 250 GRAM OR LESS N/A 2016    Procedure: HYSTERECTOMY LAPAROSCOPIC TOTAL ;  Surgeon: Kathy James MD;  [facility];  Service: Gynecology   • SALPINGECTOMY Bilateral 2016    Procedure: SALPINGECTOMY;  Surgeon:  Kathy James MD;  [facility];  Service:    • TONSILLECTOMY      RESOLVED:    • TUBAL LIGATION      RESOLVED: 14       Family History Problem Relation Age of Onset   • Venous thrombosis Mother         ACUTE; OF DEEP VESSELS OF THE DISTAL LOWER EXTREMITY   • COPD Mother    • Heart disease Mother    • Hypertension Mother    • Mental illness Mother    • Kidney disease Maternal Grandmother    • Breast cancer Paternal Grandmother 36   • Diabetes Paternal Grandfather         MELLITUS   • Colon cancer Father 61   • No Known Problems Daughter    • No Known Problems Maternal Grandfather    • No Known Problems Maternal Aunt    • No Known Problems Maternal Aunt    • No Known Problems Son          Medications have been verified  Objective   /84   Pulse 100   Temp 97 9 °F (36 6 °C)   Resp 16   Ht 5' 4" (1 626 m)   Wt 117 kg (259 lb)   LMP 12/01/2016   SpO2 98%   BMI 44 46 kg/m²   Patient's last menstrual period was 12/01/2016  Physical Exam     Physical Exam  Vitals reviewed  Constitutional:       General: She is not in acute distress  Appearance: She is well-developed and well-nourished  She is not diaphoretic  HENT:      Head: Normocephalic  Right Ear: Tympanic membrane and external ear normal       Left Ear: Tympanic membrane and external ear normal       Nose: Nose normal       Mouth/Throat:      Mouth: Oropharynx is clear and moist       Pharynx: No oropharyngeal exudate or posterior oropharyngeal erythema  Eyes:      Conjunctiva/sclera: Conjunctivae normal    Cardiovascular:      Rate and Rhythm: Normal rate and regular rhythm  Pulses: Intact distal pulses  Heart sounds: Normal heart sounds  No murmur heard  No friction rub  No gallop  Pulmonary:      Effort: Pulmonary effort is normal  No respiratory distress  Breath sounds: Normal breath sounds  No wheezing, rhonchi or rales  Lymphadenopathy:      Cervical: No cervical adenopathy  Skin:     General: Skin is warm  Neurological:      Mental Status: She is alert and oriented to person, place, and time     Psychiatric:         Mood and Affect: Mood and affect normal          Behavior: Behavior normal          Thought Content:  Thought content normal          Judgment: Judgment normal

## 2022-12-05 NOTE — LETTER
December 5, 2022     Patient: Carol Rene   YOB: 1980   Date of Visit: 12/5/2022       To Whom It May Concern: It is my medical opinion that Kenya Odell may return if COVID test is negative, symptoms are improving and patient has been fever free for 24 hours without the use of a fever reducing agent  If COVID test is positive, patient may return on 12/8/2022 and when fever free for 24 hours without the use of a fever reducing agent  Upon return, the patient must then adhere to strict masking for an additional 5 days  If you have any questions or concerns, please don't hesitate to call           Sincerely,        Nick Lewis PA-C    CC: No Recipients

## 2022-12-05 NOTE — PATIENT INSTRUCTIONS
Vitamin D3 2000 IU daily  Vitamin C 1000mg twice per day  Multivitamin daily  Fluids and rest  Over the counter cold medication as needed  Follow up with PCP in 3-5 days  Proceed to  ER if symptoms worsen  [address]    Your healthcare provider and/or public health staff have evaluated you and have determined that you do not need to remain in the hospital at this time  At this time you can be isolated at home where you will be monitored by staff from your local or state health department  You should carefully follow the prevention and isolation steps below until a healthcare provider or local or state health department says that you can return to your normal activities  Stay home except to get medical care    People who are mildly ill with COVID-19 are able to isolate at home during their illness  You should restrict activities outside your home, except for getting medical care  Do not go to work, school, or public areas  Avoid using public transportation, ride-sharing, or taxis  Separate yourself from other people and animals in your home    People: As much as possible, you should stay in a specific room and away from other people in your home  Also, you should use a separate bathroom, if available  Animals: You should restrict contact with pets and other animals while you are sick with COVID-19, just like you would around other people  Although there have not been reports of pets or other animals becoming sick with COVID-19, it is still recommended that people sick with COVID-19 limit contact with animals until more information is known about the virus  When possible, have another member of your household care for your animals while you are sick  If you are sick with COVID-19, avoid contact with your pet, including petting, snuggling, being kissed or licked, and sharing food   If you must care for your pet or be around animals while you are sick, wash your hands before and after you interact with pets and wear a facemask  See COVID-19 and Animals for more information  Call ahead before visiting your doctor    If you have a medical appointment, call the healthcare provider and tell them that you have or may have COVID-19  This will help the healthcare provider’s office take steps to keep other people from getting infected or exposed  Wear a facemask    You should wear a facemask when you are around other people (e g , sharing a room or vehicle) or pets and before you enter a healthcare provider’s office  If you are not able to wear a facemask (for example, because it causes trouble breathing), then people who live with you should not stay in the same room with you, or they should wear a facemask if they enter your room  Cover your coughs and sneezes    Cover your mouth and nose with a tissue when you cough or sneeze  Throw used tissues in a lined trash can  Immediately wash your hands with soap and water for at least 20 seconds or, if soap and water are not available, clean your hands with an alcohol-based hand  that contains at least 60% alcohol  Clean your hands often    Wash your hands often with soap and water for at least 20 seconds, especially after blowing your nose, coughing, or sneezing; going to the bathroom; and before eating or preparing food  If soap and water are not readily available, use an alcohol-based hand  with at least 60% alcohol, covering all surfaces of your hands and rubbing them together until they feel dry  Soap and water are the best option if hands are visibly dirty  Avoid touching your eyes, nose, and mouth with unwashed hands  Avoid sharing personal household items    You should not share dishes, drinking glasses, cups, eating utensils, towels, or bedding with other people or pets in your home  After using these items, they should be washed thoroughly with soap and water      Clean all “high-touch” surfaces everyday    High touch surfaces include counters, tabletops, doorknobs, bathroom fixtures, toilets, phones, keyboards, tablets, and bedside tables  Also, clean any surfaces that may have blood, stool, or body fluids on them  Use a household cleaning spray or wipe, according to the label instructions  Labels contain instructions for safe and effective use of the cleaning product including precautions you should take when applying the product, such as wearing gloves and making sure you have good ventilation during use of the product  Monitor your symptoms    Seek prompt medical attention if your illness is worsening (e g , difficulty breathing)  Before seeking care, call your healthcare provider and tell them that you have, or are being evaluated for, COVID-19  Put on a facemask before you enter the facility  These steps will help the healthcare provider’s office to keep other people in the office or waiting room from getting infected or exposed  Ask your healthcare provider to call the local or [facility] health department  Persons who are placed under active monitoring or facilitated self-monitoring should follow instructions provided by their local health department or occupational health professionals, as appropriate  If you have a medical emergency and need to call 911, notify the dispatch personnel that you have, or are being evaluated for COVID-19  If possible, put on a facemask before emergency medical services arrive  Discontinuing home isolation    Patients with confirmed COVID-19 should remain under home isolation precautions until the following conditions are met:    They have had no fever for at least 24 hours (that is one full day of no fever without the use medicine that reduces fevers)  AND  other symptoms have improved (for example, when their cough or shortness of breath have improved)  AND  If had mild or moderate illness, at least 10 days have passed since their symptoms first appeared or if severe illness (needed oxygen) or immunosuppressed, at least 20 days have passed since symptoms first appeared  Patients with confirmed COVID-19 should also notify close contacts (including their workplace) and ask that they self-quarantine  Currently, close contact is defined as being within 6 feet for 15 minutes or more from the period 24 hours starting 48 hours before symptom onset to the time at which the patient went into isolation  Close contacts of patients diagnosed with COVID-19 should be instructed by the patient to self-quarantine for 14 days from the last time of their last contact with the patient       Source: [employer]

## 2022-12-06 ENCOUNTER — TELEPHONE (OUTPATIENT)
Dept: URGENT CARE | Facility: CLINIC | Age: 42
End: 2022-12-06

## 2022-12-06 LAB
FLUAV RNA RESP QL NAA+PROBE: NEGATIVE
FLUBV RNA RESP QL NAA+PROBE: NEGATIVE
SARS-COV-2 RNA RESP QL NAA+PROBE: POSITIVE

## 2022-12-08 DIAGNOSIS — I10 ESSENTIAL HYPERTENSION: ICD-10-CM

## 2022-12-08 RX ORDER — LISINOPRIL 10 MG/1
TABLET ORAL
Qty: 30 TABLET | Refills: 0 | Status: SHIPPED | OUTPATIENT
Start: 2022-12-08

## 2022-12-16 ENCOUNTER — OFFICE VISIT (OUTPATIENT)
Dept: OBGYN CLINIC | Facility: CLINIC | Age: 42
End: 2022-12-16

## 2022-12-16 ENCOUNTER — APPOINTMENT (OUTPATIENT)
Dept: URGENT CARE | Facility: CLINIC | Age: 42
End: 2022-12-16

## 2022-12-16 VITALS
WEIGHT: 259 LBS | SYSTOLIC BLOOD PRESSURE: 123 MMHG | BODY MASS INDEX: 44.22 KG/M2 | HEIGHT: 64 IN | HEART RATE: 88 BPM | DIASTOLIC BLOOD PRESSURE: 81 MMHG

## 2022-12-16 DIAGNOSIS — S76.311A HAMSTRING STRAIN, RIGHT, INITIAL ENCOUNTER: Primary | ICD-10-CM

## 2022-12-16 NOTE — PROGRESS NOTES
Assessment/Plan:    No problem-specific Assessment & Plan notes found for this encounter  Diagnoses and all orders for this visit:    Hamstring strain, right, initial encounter          The patient is doing quite well  She offers no complaints of pain  Strength and motion in tact  Continue home exercise program   Follow up on as-needed basis  If her condition changes, she will not hesitate to let us know  She was released to go back to work full and unlimited duty    Subjective:      Patient ID: Dea Lay is a 38 y o  female  HPI    The patient has a history of a hamstring strain of her right lower extremity  She offers no complaints of pain  She was discharged from physical therapy  She continues to perform home exercise program   She is pleased with her results  She desires to go back to work full duty    The following portions of the patient's history were reviewed and updated as appropriate: allergies, current medications, past family history, past medical history, past social history, past surgical history and problem list     Review of Systems   Constitutional: Negative for chills, fever and unexpected weight change  HENT: Negative for hearing loss, nosebleeds and sore throat  Eyes: Negative for pain, redness and visual disturbance  Respiratory: Negative for cough, shortness of breath and wheezing  Cardiovascular: Negative for chest pain, palpitations and leg swelling  Gastrointestinal: Negative for abdominal pain, nausea and vomiting  Endocrine: Negative for polydipsia and polyuria  Genitourinary: Negative for dysuria and hematuria  Musculoskeletal: Negative for arthralgias, back pain, gait problem, joint swelling, myalgias, neck pain and neck stiffness  As noted in HPI   Skin: Negative for rash and wound  Neurological: Negative for dizziness, numbness and headaches  Psychiatric/Behavioral: Negative for decreased concentration and suicidal ideas   The patient is not nervous/anxious  Objective:      /81 (BP Location: Left arm, Patient Position: Sitting, Cuff Size: Large)   Pulse 88   Ht 5' 4" (1 626 m)   Wt 117 kg (259 lb)   LMP 12/01/2016   BMI 44 46 kg/m²          Physical Exam      Right lower extremity is neurovascular intact  Toes are pink and mobile  Compartments are soft  There is full strength full motion along her right knee  There is a negative Lachman's, drawer, pivot shift test   There is no medial or lateral instability  There is no medial or lateral joint tenderness  No hamstring contractures present  No hamstring pain    Strength intact

## 2022-12-16 NOTE — LETTER
December 16, 2022     Patient: Yeimi Cardoso  YOB: 1980  Date of Visit: 12/16/2022      To Whom it May Concern:    Jose Rabago is under my professional care  Alejandra was seen in my office on 12/16/2022  Alejandra may return to work on December 16, 2022 without restrictions  If you have any questions or concerns, please don't hesitate to call  Sincerely,          Bakari Stevenson DO        CC: Alejandra Lubin

## 2022-12-31 DIAGNOSIS — I10 ESSENTIAL HYPERTENSION: ICD-10-CM

## 2023-01-03 RX ORDER — LISINOPRIL 10 MG/1
10 TABLET ORAL DAILY
Qty: 30 TABLET | Refills: 5 | Status: SHIPPED | OUTPATIENT
Start: 2023-01-03

## 2023-01-04 ENCOUNTER — LAB (OUTPATIENT)
Dept: LAB | Facility: CLINIC | Age: 43
End: 2023-01-04

## 2023-01-04 DIAGNOSIS — Z13.29 SCREENING FOR THYROID DISORDER: ICD-10-CM

## 2023-01-04 DIAGNOSIS — Z13.0 SCREENING FOR DEFICIENCY ANEMIA: ICD-10-CM

## 2023-01-04 DIAGNOSIS — Z13.1 SCREENING FOR DIABETES MELLITUS: ICD-10-CM

## 2023-01-04 DIAGNOSIS — E55.9 VITAMIN D DEFICIENCY: ICD-10-CM

## 2023-01-04 DIAGNOSIS — Z13.220 SCREENING, LIPID: ICD-10-CM

## 2023-01-04 LAB
25(OH)D3 SERPL-MCNC: 17.5 NG/ML (ref 30–100)
ALBUMIN SERPL BCP-MCNC: 3.4 G/DL (ref 3.5–5)
ALP SERPL-CCNC: 71 U/L (ref 46–116)
ALT SERPL W P-5'-P-CCNC: 36 U/L (ref 12–78)
ANION GAP SERPL CALCULATED.3IONS-SCNC: 8 MMOL/L (ref 4–13)
AST SERPL W P-5'-P-CCNC: 20 U/L (ref 5–45)
BASOPHILS # BLD AUTO: 0.07 THOUSANDS/ÂΜL (ref 0–0.1)
BASOPHILS NFR BLD AUTO: 1 % (ref 0–1)
BILIRUB SERPL-MCNC: 0.64 MG/DL (ref 0.2–1)
BUN SERPL-MCNC: 12 MG/DL (ref 5–25)
CALCIUM ALBUM COR SERPL-MCNC: 9.1 MG/DL (ref 8.3–10.1)
CALCIUM SERPL-MCNC: 8.6 MG/DL (ref 8.3–10.1)
CHLORIDE SERPL-SCNC: 107 MMOL/L (ref 96–108)
CHOLEST SERPL-MCNC: 165 MG/DL
CO2 SERPL-SCNC: 24 MMOL/L (ref 21–32)
CREAT SERPL-MCNC: 0.93 MG/DL (ref 0.6–1.3)
EOSINOPHIL # BLD AUTO: 0.2 THOUSAND/ÂΜL (ref 0–0.61)
EOSINOPHIL NFR BLD AUTO: 3 % (ref 0–6)
ERYTHROCYTE [DISTWIDTH] IN BLOOD BY AUTOMATED COUNT: 12.2 % (ref 11.6–15.1)
GFR SERPL CREATININE-BSD FRML MDRD: 77 ML/MIN/1.73SQ M
GLUCOSE P FAST SERPL-MCNC: 105 MG/DL (ref 65–99)
HCT VFR BLD AUTO: 39.9 % (ref 34.8–46.1)
HDLC SERPL-MCNC: 52 MG/DL
HGB BLD-MCNC: 13.7 G/DL (ref 11.5–15.4)
IMM GRANULOCYTES # BLD AUTO: 0.01 THOUSAND/UL (ref 0–0.2)
IMM GRANULOCYTES NFR BLD AUTO: 0 % (ref 0–2)
LDLC SERPL CALC-MCNC: 80 MG/DL (ref 0–100)
LYMPHOCYTES # BLD AUTO: 2.63 THOUSANDS/ÂΜL (ref 0.6–4.47)
LYMPHOCYTES NFR BLD AUTO: 39 % (ref 14–44)
MCH RBC QN AUTO: 31.6 PG (ref 26.8–34.3)
MCHC RBC AUTO-ENTMCNC: 34.3 G/DL (ref 31.4–37.4)
MCV RBC AUTO: 92 FL (ref 82–98)
MONOCYTES # BLD AUTO: 0.4 THOUSAND/ÂΜL (ref 0.17–1.22)
MONOCYTES NFR BLD AUTO: 6 % (ref 4–12)
NEUTROPHILS # BLD AUTO: 3.49 THOUSANDS/ÂΜL (ref 1.85–7.62)
NEUTS SEG NFR BLD AUTO: 51 % (ref 43–75)
NONHDLC SERPL-MCNC: 113 MG/DL
NRBC BLD AUTO-RTO: 0 /100 WBCS
PLATELET # BLD AUTO: 272 THOUSANDS/UL (ref 149–390)
PMV BLD AUTO: 9.9 FL (ref 8.9–12.7)
POTASSIUM SERPL-SCNC: 3.3 MMOL/L (ref 3.5–5.3)
PROT SERPL-MCNC: 6.9 G/DL (ref 6.4–8.4)
RBC # BLD AUTO: 4.34 MILLION/UL (ref 3.81–5.12)
SODIUM SERPL-SCNC: 139 MMOL/L (ref 135–147)
TRIGL SERPL-MCNC: 165 MG/DL
TSH SERPL DL<=0.05 MIU/L-ACNC: 2.87 UIU/ML (ref 0.45–4.5)
WBC # BLD AUTO: 6.8 THOUSAND/UL (ref 4.31–10.16)

## 2023-01-09 NOTE — RESULT ENCOUNTER NOTE
Eamon Roberts  I reviewed your labs and all looked good except for low vitamin D  I would recommend taking vitamin d3 5000iu daily   Have a great day!   -Isaac Silva

## 2023-01-18 DIAGNOSIS — F51.01 PRIMARY INSOMNIA: ICD-10-CM

## 2023-01-18 RX ORDER — TRAZODONE HYDROCHLORIDE 50 MG/1
50 TABLET ORAL
Qty: 30 TABLET | Refills: 0 | Status: SHIPPED | OUTPATIENT
Start: 2023-01-18

## 2023-02-01 ENCOUNTER — APPOINTMENT (EMERGENCY)
Dept: RADIOLOGY | Facility: HOSPITAL | Age: 43
End: 2023-02-01

## 2023-02-01 ENCOUNTER — APPOINTMENT (EMERGENCY)
Dept: CT IMAGING | Facility: HOSPITAL | Age: 43
End: 2023-02-01

## 2023-02-01 ENCOUNTER — HOSPITAL ENCOUNTER (EMERGENCY)
Facility: HOSPITAL | Age: 43
Discharge: HOME | End: 2023-02-02
Attending: EMERGENCY MEDICINE

## 2023-02-01 VITALS
DIASTOLIC BLOOD PRESSURE: 82 MMHG | HEART RATE: 91 BPM | TEMPERATURE: 98.2 F | OXYGEN SATURATION: 96 % | RESPIRATION RATE: 18 BRPM | SYSTOLIC BLOOD PRESSURE: 120 MMHG

## 2023-02-01 DIAGNOSIS — M54.50 LOW BACK PAIN WITHOUT SCIATICA, UNSPECIFIED BACK PAIN LATERALITY, UNSPECIFIED CHRONICITY: Primary | ICD-10-CM

## 2023-02-01 DIAGNOSIS — K21.00 GASTROESOPHAGEAL REFLUX DISEASE WITH ESOPHAGITIS: ICD-10-CM

## 2023-02-01 DIAGNOSIS — E04.1 THYROID NODULE: ICD-10-CM
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
## 2023-02-01 DIAGNOSIS — M54.9 BACK PAIN: Primary | ICD-10-CM

## 2023-02-01 DIAGNOSIS — M25.511 RIGHT SHOULDER PAIN: ICD-10-CM

## 2023-02-01 RX ORDER — KETOROLAC TROMETHAMINE 30 MG/ML
30 INJECTION, SOLUTION INTRAMUSCULAR; INTRAVENOUS ONCE
Status: COMPLETED | OUTPATIENT
Start: 2023-02-01 | End: 2023-02-01

## 2023-02-01 RX ORDER — CYCLOBENZAPRINE HCL 10 MG
10 TABLET ORAL 3 TIMES DAILY PRN
Qty: 30 TABLET | Refills: 0 | Status: SHIPPED | OUTPATIENT
Start: 2023-02-01

## 2023-02-01 RX ORDER — ACETAMINOPHEN 325 MG/1
975 TABLET ORAL ONCE
Status: COMPLETED | OUTPATIENT
Start: 2023-02-01 | End: 2023-02-01

## 2023-02-01 RX ORDER — OMEPRAZOLE 20 MG/1
CAPSULE, DELAYED RELEASE ORAL
Qty: 30 CAPSULE | Refills: 5 | Status: SHIPPED | OUTPATIENT
Start: 2023-02-01

## 2023-02-01 RX ADMIN — ACETAMINOPHEN 975 MG: 325 TABLET ORAL at 22:10

## 2023-02-01 RX ADMIN — KETOROLAC TROMETHAMINE 30 MG: 30 INJECTION, SOLUTION INTRAMUSCULAR at 22:10

## 2023-02-01 NOTE — Clinical Note
Lex Covarrubias was seen and treated in our emergency department on 2/1/2023     ?    ? ? Diagnosis: ?    Alejandra  may return to work on return date  She may return on this date: 02/06/2023    ? If you have any questions or concerns, please don't hesitate to call        Issac Savage MD    ______________________________           _______________          _______________  Hospital Representative                              Date                                Time

## 2023-02-02 ENCOUNTER — NURSE TRIAGE (OUTPATIENT)
Dept: PHYSICAL THERAPY | Facility: OTHER | Age: 43
End: 2023-02-02

## 2023-02-02 DIAGNOSIS — M54.50 ACUTE MIDLINE LOW BACK PAIN, UNSPECIFIED WHETHER SCIATICA PRESENT: Primary | ICD-10-CM

## 2023-02-02 NOTE — TELEPHONE ENCOUNTER
Additional Information  • Negative: Is this related to a work injury? • Negative: Is this related to an MVA? • Negative: Are you currently recieving homecare services? • Negative: Has the patient had unexplained weight loss? • Negative: Does the patient have a fever? • Negative: Is the patient experiencing blood in sputum? • Negative: Is the patient experiencing urine retention? • Negative: Is the patient experiencing acute drop foot or paralysis? • Negative: Has the patient experienced major trauma? (fall from height, high speed collision, direct blow to spine) and is also experiencing nausea, light-headedness, or loss of consciousness? • Negative: Is this a chronic condition? Background - Initial Assessment  Clinical complaint: midline low back pain that can "go down R buttock" and RLE  Date of onset: Fall in shower yesterday 2/1/23- Warranted ED visit  Frequency of pain: constant  Quality of pain: sharp and shooting    Protocols used: [facility] COMPREHENSIVE SPINE PROGRAM PROTOCOL    Patient seen in ED yesterday/today-PLEASE SEE NOTES WHEN COMPLETE  This RN did review the Comprehensive Spine Program in detail and what we offer for her back or neck pain  Patient stated she has a minor fall on Friday and fall in shower yesterday  Patient is agreeable to triage and would like to proceed w/ Evaluation/Sessions with [facility] therapist  Patient to discuss appropriate tx/plan of care and other necessary interventions at that time  Triaged and NO RF s/s present  Patients information was sent to the preferred site and patient made aware clerical would be calling to schedule appointment  Contact information for the [facility] site was provided for the patient as well  Nurse also offered a call from the [address] [place] [address] counselor d/t SBT score  Patient declined  Patient was pleasant and appropriate during this encounter  Patient did not voice any questions and/or concerns   All information about patients intended careplan reviewed and the patient is in agreement with this RN's explaination of treatment plan  Patient appreciative of call and referral placement  Nurse wished patient well and CSP referral closed per protocol

## 2023-02-02 NOTE — DISCHARGE INSTRUCTIONS
-Use ibuprofen 800 mg 3 times a day and Tylenol with as milligrams 3 times a day for the next 3 days as needed for pain and return to care 0    No worsening symptoms    -Follow-up with thyroid ultrasound for thyroid nodules

## 2023-02-07 ENCOUNTER — HOSPITAL ENCOUNTER (OUTPATIENT)
Dept: ULTRASOUND IMAGING | Facility: HOSPITAL | Age: 43
Discharge: HOME | End: 2023-02-07

## 2023-02-07 DIAGNOSIS — E04.1 THYROID NODULE: ICD-10-CM

## 2023-02-07 NOTE — ED PROVIDER NOTES
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 and felt some lower back pain  She says that she has been having some mild to moderate intermittent lumbar back pain since that time  Is worsened by certain positions and movement  Patient denies any trauma, unexplained weight loss, focal neurological deficit, bowel/bladder incontinence, fever, IV drug use, steroid use, known history of cancer  She has been taking some Tylenol without much relief of her symptoms  Patient says that today she was in the shower getting ready for work when she had a mechanical fall falling backwards and hitting her back  She denies head strike headache nausea vomiting ultimate status or focal neurologic deficit  No blood thinners were completed  He does describe lumbar back pain radiating up in the thoracic back that has worsened since the fall  She also does complain of some right shoulder pain since the fall  Prior to Admission Medications   Prescriptions Last Dose Informant Patient Reported? Taking?    albuterol (Ventolin HFA) 90 mcg/act inhaler   No No   Sig: Inhale 2 puffs every 6 (six) hours as needed for wheezing   buPROPion (Wellbutrin XL) 150 mg 24 hr tablet   No No   Sig: Take 1 tablet (150 mg total) by mouth every morning   cyclobenzaprine (FLEXERIL) 10 mg tablet   No No   Sig: Take 1 tablet (10 mg total) by mouth 3 (three) times a day as needed for muscle spasms   lisinopril (ZESTRIL) 10 mg tablet   No No   Sig: Take 1 tablet (10 mg total) by mouth daily   omeprazole (PriLOSEC) 20 mg delayed release capsule   No No   Sig: take 1 capsule by mouth once daily   traZODone (DESYREL) 50 mg tablet   No No   Sig: Take 1 tablet (50 mg total) by mouth daily at bedtime      Facility-Administered Medications: None       Past Medical History:   Diagnosis Date   • Anxiety    • Asthma    • Complete spontaneous  without complication     FIVE TIMES   • Concussion    • Depression    • Headache    • Hypertension    • Insomnia    • Normal delivery     DAUGHTER   • Normal delivery     SON   • Pelvic inflammatory disease     RESOLVED: 16   • Post concussion syndrome 2019   • Recurrent pregnancy loss (CODE)    • Thyroid nodule 3/2/2019       Past Surgical History:   Procedure Laterality Date   • CHOLECYSTECTOMY     • CYSTOSCOPY N/A 2016    Procedure: CYSTOSCOPY;  Surgeon: Mel Guevara MD;  Location: [facility];  Service:    • HYSTERECTOMY     • LAPAROSCOPIC CHOLECYSTECTOMY      RESOLVED:    • NASAL SEPTUM SURGERY      REESOLVED: ; DEVIATION   • AK LAPAROSCOPY W TOTAL HYSTERECTOMY UTERUS 250 GM/< N/A 2016    Procedure: HYSTERECTOMY LAPAROSCOPIC TOTAL ;  Surgeon: Mel Guevara MD;  Location: [facility];  Service: Gynecology   • SALPINGECTOMY Bilateral 2016    Procedure: SALPINGECTOMY;  Surgeon:  Mel Guevara MD;  Location: [facility];  Service:    • TONSILLECTOMY      RESOLVED:    • TUBAL LIGATION      RESOLVED: 14       Family History   Problem Relation Age of Onset   • Venous thrombosis Mother         ACUTE; OF DEEP VESSELS OF THE DISTAL LOWER EXTREMITY   • COPD Mother    • Heart disease Mother    • Hypertension Mother    • Mental illness Mother    • Kidney disease Maternal Grandmother    • Breast cancer Paternal Grandmother 36   • Diabetes Paternal Grandfather         MELLITUS   • Colon cancer Father 61   • No Known Problems Daughter    • No Known Problems Maternal Grandfather    • No Known Problems Maternal Aunt    • No Known Problems Maternal Aunt    • No Known Problems Son      I have reviewed and agree with the history as documented  E-Cigarette/Vaping   • E-Cigarette Use Never User      E-Cigarette/Vaping Substances   • Nicotine No    • THC No    • CBD No    • Flavoring No    • Other No    • Unknown No      Social History     Tobacco Use   • Smoking status: Former     Types: Cigars   • Smokeless tobacco: Never   Vaping Use   • Vaping Use: Never used   Substance Use Topics   • Alcohol use: Yes     Comment: socially   • Drug use: No       Review of Systems   Constitutional: Negative for fever  HENT: Negative for sore throat  Respiratory: Negative for shortness of breath  Cardiovascular: Negative for chest pain  Gastrointestinal: Negative for abdominal pain  Genitourinary: Negative for dysuria  Musculoskeletal: Positive for back pain  Right shoulder pain   Skin: Negative for rash  Neurological: Negative for light-headedness  Psychiatric/Behavioral: Negative for agitation  All other systems reviewed and are negative  Physical Exam  Physical Exam  Vitals reviewed  Constitutional:       General: She is not in acute distress  Appearance: She is well-developed  HENT:      Head: Normocephalic  Eyes:      Pupils: Pupils are equal, round, and reactive to light  Cardiovascular:      Rate and Rhythm: Normal rate and regular rhythm  Heart sounds: Normal heart sounds  Pulmonary:      Effort: Pulmonary effort is normal       Breath sounds: Normal breath sounds  Abdominal:      General: Bowel sounds are normal  There is no distension  Palpations: Abdomen is soft  Tenderness: There is no abdominal tenderness  There is no guarding  Musculoskeletal:         General: Tenderness present  No deformity  Normal range of motion  Cervical back: Normal range of motion and neck supple  Comments: Reproducible tenderness in the thoracic and lumbar back  No cervical neck tenderness  Diffuse tenderness of the right shoulder with full range of motion    Neurovascular intact right upper extremity  Skin:     General: Skin is warm and dry  Capillary Refill: Capillary refill takes less than 2 seconds  Neurological:      Mental Status: She is alert and oriented to person, place, and time  Cranial Nerves: No cranial nerve deficit  Sensory: No sensory deficit  Psychiatric:         Behavior: Behavior normal          Thought Content: Thought content normal          Judgment: Judgment normal          Vital Signs  ED Triage Vitals   Temperature Pulse Respirations Blood Pressure SpO2   02/01/23 2215 02/01/23 2154 02/01/23 2154 02/01/23 2215 02/01/23 2154   98 2 °F (36 8 °C) 91 18 120/82 97 %      Temp src Heart Rate Source Patient Position - Orthostatic VS BP Location FiO2 (%)   -- -- -- -- --             Pain Score       02/01/23 2154       9           Vitals:    02/01/23 2154 02/01/23 2215   BP:  120/82   Pulse: 91          Visual Acuity      ED Medications  Medications   ketorolac (TORADOL) injection 30 mg (30 mg Intramuscular Given 2/1/23 2210)   acetaminophen (TYLENOL) tablet 975 mg (975 mg Oral Given 2/1/23 2210)       Diagnostic Studies  Results Reviewed     None                 XR shoulder 2+ vw right   ED Interpretation by Shanice Sarabia MD (02/01 2254)   ED wet read: No acute fracture sepsis with patient noted      Final Result by Quinn Lyons MD (02/02 1106)      No acute osseous abnormality  Workstation performed: [id number]         XR chest 1 view   Final Result by Quinn Lyons MD (02/02 1107)      No acute cardiopulmonary disease  Workstation performed: [id number]         CT spine thoracic & lumbar wo contrast   Final Result by Sharon Ha DO (02/02 0031)      Normal computed tomography of the thoracic and lumbar spine        Incidental thyroid nodule(s) for which thyroid ultrasound is recommended      Workstation performed: [id number]         US thyroid    (Results Pending)              Procedures  Procedures         ED Course Medical Decision Making  Patient is a 45-year-old female presents for evaluation of back pain and right shoulder pain after a fall  Initial differential included thoracic/lumbar fractures, right shoulder fracture, muscle strain versus muscle spasm  Patient does not have any red flags on history or physical exam as well as back injury is concerned  She is adamant that this was mechanical fall and was no loss of consciousness or syncope  Advised supportive measures and strict return precautions as CT thoracic and lumbar spine imaging was negative      Back pain: complicated acute illness or injury  Fall, initial encounter: complicated acute illness or injury  Right shoulder pain: complicated acute illness or injury  Thyroid nodule: undiagnosed new problem with uncertain prognosis     Details: Instructed on the importance of following up for thyroid ultrasound  Amount and/or Complexity of Data Reviewed  Radiology: ordered and independent interpretation performed  Details: Personally reviewed right shoulder and chest x-rays  Risk  OTC drugs  Prescription drug management  Disposition  Final diagnoses:   Back pain   Right shoulder pain   Fall, initial encounter   Thyroid nodule     Time reflects when diagnosis was documented in both MDM as applicable and the Disposition within this note     Time User Action Codes Description Comment    2/2/2023 12:37 AM Wallace Liming Add [M54 9] Back pain     2/2/2023 12:37 AM Wallace Liming Add [M25 511] Right shoulder pain     2/2/2023 12:37 AM Wallace Liming Add [B27  UPPP] Fall, initial encounter     2/2/2023 12:37 AM Wallace Liming Add [E04 1] Thyroid nodule       ED Disposition     ED Disposition   Discharge    Condition   Stable    Date/Time   Thu Feb 2, 2023 12:37 AM    Comment   Alejandra Cope discharge to home/self care                 Follow-up Information     Follow up With Specialties Details Why Contact Info Additional [address] Emergency Department Emergency Medicine  If symptoms worsen [address] [place] [place] [phone]  [facility] Emergency Department, [address] [place], [place], [address] [place], [place] Family Medicine, Internal Medicine, Physician Assistant Schedule an appointment as soon as possible for a visit  For thyroid nodule follow-up [address]  [phone]             Discharge Medication List as of 2/2/2023 12:40 AM      CONTINUE these medications which have NOT CHANGED    Details   albuterol (Ventolin HFA) 90 mcg/act inhaler Inhale 2 puffs every 6 (six) hours as needed for wheezing, Starting Wed 12/22/2021, Normal      buPROPion (Wellbutrin XL) 150 mg 24 hr tablet Take 1 tablet (150 mg total) by mouth every morning, Starting Wed 11/16/2022, Normal      cyclobenzaprine (FLEXERIL) 10 mg tablet Take 1 tablet (10 mg total) by mouth 3 (three) times a day as needed for muscle spasms, Starting Wed 2/1/2023, Normal      lisinopril (ZESTRIL) 10 mg tablet Take 1 tablet (10 mg total) by mouth daily, Starting Tue 1/3/2023, Normal      omeprazole (PriLOSEC) 20 mg delayed release capsule take 1 capsule by mouth once daily, Normal      traZODone (DESYREL) 50 mg tablet Take 1 tablet (50 mg total) by mouth daily at bedtime, Starting Wed 1/18/2023, Normal             Outpatient Discharge Orders   US thyroid   Standing Status: Future Standing Exp   Date: 02/02/27       PDMP Review     None          ED Provider  Electronically Signed by           Shanice Sarabia MD  02/07/23 [id number]

## 2023-02-14 DIAGNOSIS — F51.01 PRIMARY INSOMNIA: ICD-10-CM

## 2023-02-14 RX ORDER — TRAZODONE HYDROCHLORIDE 50 MG/1
TABLET ORAL
Qty: 30 TABLET | Refills: 0 | Status: SHIPPED | OUTPATIENT
Start: 2023-02-14

## 2023-02-15 ENCOUNTER — EVALUATION (OUTPATIENT)
Dept: PHYSICAL THERAPY | Facility: CLINIC | Age: 43
End: 2023-02-15

## 2023-02-15 DIAGNOSIS — G89.29 CHRONIC BILATERAL LOW BACK PAIN WITHOUT SCIATICA: Primary | ICD-10-CM

## 2023-02-15 DIAGNOSIS — M54.50 CHRONIC BILATERAL LOW BACK PAIN WITHOUT SCIATICA: Primary | ICD-10-CM

## 2023-02-15 NOTE — PROGRESS NOTES
PT Evaluation     Today's date: 2/15/2023  Patient name: Elsa Bellamy  : 1980  MRN: [id number]  Referring provider: Perry Srinivasan PA-C  Dx:   Encounter Diagnosis     ICD-10-CM    1  Chronic bilateral low back pain without sciatica  M54 50 Ambulatory referral to PT spine    G89 29                      Assessment  Assessment details: Elsa Bellamy is a 38 y o  female who was evaluated on 2/15/2023 for chronic LBP  Initial flare up of pain noted after a recent fall in the shower for which she had gone to the ER for evaluation; notes present pain is back to baseline/chronic levels with no new sx/complaints; has been present for the past 6 months or more; no formal tx to date  During evaluation, PT notes L hip pain with IR and hip scour, FADIR, and MING testing, indicating possible intraarticular hip pathology; will monitor  Elsa Bellamy has the above listed impairments resulting in functional deficits and negative impact to quality of life   Patient is appropriate for skilled PT intervention to promote maximal return to function and patient specific goals       Patient agrees with outlined treatment plan and all questions were answered to their satisfaction  Home exercise program was provided and reviewed  Thank you for your referral           Impairments: abnormal muscle firing, abnormal or restricted ROM, abnormal movement, activity intolerance, impaired physical strength, lacks appropriate home exercise program, pain with function and poor posture   Other impairment: Limited L/S AROM with pain and apprehension, Quad and hip/glute weakness B, reduced core stabilization and strength, poor postural awareness , chronic pain     Symptom irritability: moderateUnderstanding of Dx/Px/POC: good   Prognosis: fair    Goals  STGs to be achieved in 4-6 weeks:    1   Pt will report reduced back pain levels "at worst" by at least 2 points (0-10 scale) in order to allow improved tolerance for functional mobility and reduced reliance on medication or modalities for pain relief  2  Pt will demonstrate improved AROM of L/S flexion by at least 5-10* in order to reduce pt difficulty with functional mobility and transfers  3  Pt will demonstrate improved proximal hip and knee extension strength by at least 1/2-1 MMT in order to improve lumbo-pelvic stability to reduce pain and improve function  4  Pt will report overall improved tolerance for sustained mobility/activity by at least 25-50% since [facility] with overall reduced pain levels and reduced fear avoidance  LTGs to be achieved in 8-12 weeks:    1  Pt will be independent with HEP, demonstrating proper technique with exercises and understanding of self progression of program without need for cueing or assistance  2  Pt will report minimized pain levels with at least 50% reduction in pain since [facility]  3  Pt will demonstrate WFL AROM and strength of B hips/knees without pain  4  Pt will report return to [employer] and work without limitations  5  FOTO will reflect score at discharge that is greater than or equal to predicted level  Plan  Patient would benefit from: skilled physical therapy  Planned modality interventions: cryotherapy and thermotherapy: hydrocollator packs  Planned therapy interventions: abdominal trunk stabilization, manual therapy, neuromuscular re-education, motor coordination training, patient education, self care, strengthening, stretching, therapeutic activities, therapeutic exercise, home exercise program and graded exercise  Frequency: 2x week  Duration in visits: 12  Duration in weeks: 6  Plan of Care beginning date: 2/15/2023  Plan of Care expiration date: 3/29/2023  Treatment plan discussed with: patient        Subjective Evaluation    History of Present Illness  Mechanism of injury: Per ER note:   "Patient is a 82-year-old female with a history of anxiety, depression, hypertension that presents for evaluation after a fall in the shower  Patient says that a couple days ago 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 and felt some lower back pain  She says that she has been having some mild to moderate intermittent lumbar back pain since that time  Is worsened by certain positions and movement  Patient denies any trauma, unexplained weight loss, focal neurological deficit, bowel/bladder incontinence, fever, IV drug use, steroid use, known history of cancer  She has been taking some Tylenol without much relief of her symptoms  Patient says that today she was in the shower getting ready for work when she had a mechanical fall falling backwards and hitting her back  She denies head strike headache nausea vomiting ultimate status or focal neurologic deficit  No blood thinners were completed  He does describe lumbar back pain radiating up in the thoracic back that has worsened since the fall  She also does complain of some right shoulder pain since the fall  Medical Decision Making  Patient is a 82-year-old female presents for evaluation of back pain and right shoulder pain after a fall  Initial differential included thoracic/lumbar fractures, right shoulder fracture, muscle strain versus muscle spasm  Patient does not have any red flags on history or physical exam as well as back injury is concerned  She is adamant that this was mechanical fall and was no loss of consciousness or syncope  Advised supportive measures and strict return precautions as CT thoracic and lumbar spine imaging was negative"    PT IE 2/15:  Pt notes her present LBP has been noted for at least the past 6 months; was having intermittent pain before this, however has become more constant 6 months ago  Did hurt her R leg at work some time ago  Also has had a few falls which have lead to flares of pain from time to time  No formal tx for her chronic back pain per pt   Recent injury in shower as noted above; notes her back pain had increased however is the same as her chronic pain at present  No new sx  Given mm relaxers in ER  Imaging was negative as noted  No additional tx  Referred to comprehensive spine program    Present pain is constant  Pain is 1* located central /S into upper buttocks B; pain across her back however is worse in the middle  Pain was radiating after her fall; not anymore; now back to it's "usual location"  No present sx into LEs; no n/t or sensory changes  No progressive LE weakness  No bowel/bladder change  No saddle anesthesia/paresthesias  Pain can be more sharp at times; can be pinching; can come with spasms at times making it hard to stand and walk  Sometimes has a hard time going to stand from a chair  Difficulty sleeping and finding a comfortable position  Sometimes has pain with climbing the stairs and she has to stop  Occasional pain with bending at work  Uses heating pad at times which gives some relief  Works at an assisted living facility; takes care of residents  Has f/u with PCP in May       Quality of life: good    Pain  Current pain ratin  At best pain rating: 3  At worst pain rating: 10  Location: Cent[place] L/S   Quality: dull ache and sharp (pinching)  Relieving factors: heat and medications  Aggravating factors: standing, walking, lifting and stair climbing  Symptom course: improved from her fall, no change from chronic back pain  Social Support    Employment status: working    Diagnostic Tests  CT scan: normal  Treatments  Previous treatment: medication  Current treatment: medication and physical therapy  Patient Goals  Patient goals for therapy: decreased pain and increased motion          Objective     Postural Observations  Seated posture: fair  Standing posture: fair  Correction of posture: has no consistent effect    Additional Postural Observation Details  Seated:   Forward head/rounded shoulders  Increased thoracic kyphosis   B scapular depression and protraction with mild winging   Increased PPT    Standing:  Increased lumbar lordosis       Tenderness     Lumbar Spine  No tenderness in the spinous process  Left Hip   No tenderness in the PSIS  Right Hip   No tenderness in the PSIS  Additional Tenderness Details  NO ttp noted L/S region this date  Will monitor      Neurological Testing     Sensation     Lumbar   Left   Intact: light touch    Right   Intact: light touch    Reflexes   Left   Patellar (L4): trace (1+)  Achilles (S1): trace (1+)  Clonus sign: negative    Right   Patellar (L4): trace (1+)  Achilles (S1): trace (1+)  Clonus sign: negative    Active Range of Motion     Lumbar   Flexion: 33 degrees  with pain  Extension: 10 degrees  with pain  Left lateral flexion:  with pain Restriction level: minimal  Right lateral flexion:  with pain Restriction level: minimal  Left rotation:  with pain Restriction level: moderate  Right rotation:  with pain Restriction level: minimal    Additional Active Range of Motion Details  Pt notes the most LBP flexion and extension AROM; worse at end range  Pain local to central L/S only; non radiating   No radicular pain/sx  Pain also with L rotation at end range        Strength/Myotome Testing     Lumbar   Left   Heel walk: normal  Toe walk: normal    Right   Heel walk: normal  Toe walk: normal    Left Hip   Planes of Motion   Flexion: 4+  Abduction: 4  Adduction: 4+    Right Hip   Planes of Motion   Flexion: 4+  Abduction: 4  Adduction: 4+    Left Knee   Flexion: 4  Extension: 4-    Right Knee   Flexion: 4 (pain back of R thigh)  Extension: 4-    Left Ankle/Foot   Dorsiflexion: 4+  Plantar flexion: 4+  Great toe extension: 4+    Right Ankle/Foot   Dorsiflexion: 4+  Plantar flexion: 4+  Great toe extension: 4+    Additional Strength Details  Pain R thigh with resisted knee flexion   No other pain/sx with MMT screen  Will monitor        Muscle Activation   Patient able to activate left transverse abdominals and right transverse abdominals       Additional Muscle Activation Details  Poor core activation/stabilization with bed mobility/transfers  Core weakness noted with supine to sit transfer; utilizes rolling to side with push/pulling from UEs to come to sitting with difficulty   Will cont to assess       Tests     Lumbar     Left   Negative crossed SLR, passive SLR and slump test      Right   Negative crossed SLR, passive SLR and slump test      Left Pelvic Girdle/Sacrum   Negative: thigh thrust      Left Hip   Positive MING and FADIR  Additional Tests Details  + R HS tightness noted with SLR- not concordant    + L FADIR, MING, henny for L lateral hip pain (not concordant LBP)     AROM/PROM L /R hip grossly WNL  Pain L lateral hip/thigh with end range L hip IR (not concordant LBP)  Will monitor L hip     Ambulation     Comments   Gait and transfers WNL         Flowsheet Rows    Flowsheet Row Most Recent Value   PT/OT G-Codes    Current Score 36   Projected Score 58             Precautions: HTN, anxiety, depression, GERD, HA       Re-eval Date: 3/29    Date 2/15/2023        Visit Count 1       FOTO 2/15/2023        Pain In See IE       Pain Out See IE           Manuals 2/15/2023                                        Neuro Re-Ed        TA training      TA with BKFO 10x/5" cues        10x ea 5"  Cues         TA with alt heel slide      Dead bugs 10x 5" ea cues        Quadruped alt UE/LE       Mod planks                                         Ther Ex                                                                        Ther Activity 10' total  pt education regarding pathophysiology/pathoanatomy of present pain/sx and condition, role of PT in improving pain/sx and function, pt education regarding activity modification to avoid exacerbation of sx and delayed recovery                         Gait Training                        Modalities prn                             2/15/2023  - HEP was issued and reviewed this date for above noted exercises   Pt demonstrated understanding without incident and without questions/concerns  Will continue to update upcoming

## 2023-02-20 ENCOUNTER — APPOINTMENT (OUTPATIENT)
Dept: PHYSICAL THERAPY | Facility: CLINIC | Age: 43
End: 2023-02-20

## 2023-02-22 ENCOUNTER — OFFICE VISIT (OUTPATIENT)
Dept: PHYSICAL THERAPY | Facility: CLINIC | Age: 43
End: 2023-02-22

## 2023-02-22 DIAGNOSIS — G89.29 CHRONIC BILATERAL LOW BACK PAIN WITHOUT SCIATICA: Primary | ICD-10-CM

## 2023-02-22 DIAGNOSIS — M54.50 CHRONIC BILATERAL LOW BACK PAIN WITHOUT SCIATICA: Primary | ICD-10-CM

## 2023-02-22 DIAGNOSIS — M54.50 ACUTE MIDLINE LOW BACK PAIN, UNSPECIFIED WHETHER SCIATICA PRESENT: ICD-10-CM

## 2023-02-22 NOTE — PROGRESS NOTES
Daily Note     Today's date: 2023  Patient name: Yeimi Cardoso  : 1980  MRN: [id number]  Referring provider: J Luis Bryant, PT  Dx:   Encounter Diagnosis     ICD-10-CM    1  Chronic bilateral low back pain without sciatica  M54 50     G89 29       2  Acute midline low back pain, unspecified whether sciatica present  M54 50           Start Time: 0745  Stop Time: 0830  Total time in clinic (min): 45 minutes    Subjective: "I just got off a 10 hour shift and I haven't slept yet  I am having pain down my R leg down to the back of my knee "      Objective: See treatment diary below      Assessment: Tolerated treatment well  Initiated exercise program this date  No increased in symptoms throughout session  Radiating symptoms centralized to LB  Will continue to progress as able  Patient demonstrated fatigue post treatment and would benefit from continued PT      Plan: Continue per plan of care  Progress treatment as tolerated         Precautions: HTN, anxiety, depression, GERD, HA       Re-eval Date: 3/29    Date 2/15/2023  2/22      Visit Count 1 2      FOTO 2/15/2023        Pain In See IE 6/10      Pain Out See IE 5/10          Manuals 2/15/2023  2/22                                      Neuro Re-Ed        TA training      TA with BKFO 10x/5" cues        10x ea 5"  Cues   15x/3-5"         2x10/3-5"      TA with alt heel slide      Dead bugs 10x 5" ea cues  2x10/3-5"      Quadruped alt UE/LE       Mod planks                                         Ther Ex        SLR  2x10/3-5"      bridges  2x10/3-5"      clamshells        HS stretch  3x30"      piriformis  3x30"                              Ther Activity 10' total  pt education regarding pathophysiology/pathoanatomy of present pain/sx and condition, role of PT in improving pain/sx and function, pt education regarding activity modification to avoid exacerbation of sx and delayed recovery                         Gait Training Modalities prn                             2/15/2023  - HEP was issued and reviewed this date for above noted exercises  Pt demonstrated understanding without incident and without questions/concerns  Will continue to update upcoming

## 2023-02-27 ENCOUNTER — OFFICE VISIT (OUTPATIENT)
Dept: PHYSICAL THERAPY | Facility: CLINIC | Age: 43
End: 2023-02-27

## 2023-02-27 DIAGNOSIS — M54.50 ACUTE MIDLINE LOW BACK PAIN, UNSPECIFIED WHETHER SCIATICA PRESENT: ICD-10-CM

## 2023-02-27 DIAGNOSIS — M54.50 CHRONIC BILATERAL LOW BACK PAIN WITHOUT SCIATICA: Primary | ICD-10-CM

## 2023-02-27 DIAGNOSIS — G89.29 CHRONIC BILATERAL LOW BACK PAIN WITHOUT SCIATICA: Primary | ICD-10-CM

## 2023-02-27 NOTE — PROGRESS NOTES
Daily Note     Today's date: 2023  Patient name: Ophelia Foster  : 1980  MRN: [id number]  Referring provider: Michel Dunaway, PT  Dx:   Encounter Diagnosis     ICD-10-CM    1  Chronic bilateral low back pain without sciatica  M54 50     G89 29       2  Acute midline low back pain, unspecified whether sciatica present  M54 50           Start Time: 0730  Stop Time: 08  Total time in clinic (min): 55 minutes    Subjective: "I worked all weekend and I am tired  I just got off a 16 hour shift  I am having more pain in the back of my R leg into my foot today  "      Objective: See treatment diary below      Assessment: Tolerated treatment well  Pt able to complete program without incident  Trial of prone exercises without change in symptoms  Noted radiating symptoms into R foot  Will continue to progress as able to address deficits  Patient demonstrated fatigue post treatment and would benefit from continued PT      Plan: Continue per plan of care  Progress treatment as tolerated         Precautions: HTN, anxiety, depression, GERD, HA       Re-eval Date: 3/29    Date 2/15/2023  2/22 2/27     Visit Count 1 2 3     FOTO 2/15/2023        Pain In See IE 6/10 7-8/10     Pain Out See IE 5/10 7-8/10         Manuals 2/15/2023  2/22 2/27                                     Neuro Re-Ed        TA training      TA with BKFO 10x/5" cues        10x ea 5"  Cues   15x/3-5"         2x10/3-5" 15x/3-5"         2x10/3-5"     TA with alt heel slide      Dead bugs 10x 5" ea cues  2x10/3-5" 2x10/3-5"     Quadruped alt UE/LE       Mod planks                 Prone laying   2'     JUAN ALBERTO   2'     PPU   1x10     Ther Ex        SLR  2x10/3-5" 2x10/3-5"     bridges  2x10/3-5" 2x10/3-5"     clamshells        HS stretch  3x30" 3x30"     piriformis  3x30" 3x30"     Nu-step   L3 10'                     Ther Activity 10' total  pt education regarding pathophysiology/pathoanatomy of present pain/sx and condition, role of PT in improving pain/sx and function, pt education regarding activity modification to avoid exacerbation of sx and delayed recovery                         Gait Training                        Modalities prn                             2/15/2023  - HEP was issued and reviewed this date for above noted exercises  Pt demonstrated understanding without incident and without questions/concerns  Will continue to update upcoming

## 2023-03-01 ENCOUNTER — OFFICE VISIT (OUTPATIENT)
Dept: PHYSICAL THERAPY | Facility: CLINIC | Age: 43
End: 2023-03-01

## 2023-03-01 DIAGNOSIS — M54.50 CHRONIC BILATERAL LOW BACK PAIN WITHOUT SCIATICA: Primary | ICD-10-CM

## 2023-03-01 DIAGNOSIS — M54.50 ACUTE MIDLINE LOW BACK PAIN, UNSPECIFIED WHETHER SCIATICA PRESENT: ICD-10-CM

## 2023-03-01 DIAGNOSIS — G89.29 CHRONIC BILATERAL LOW BACK PAIN WITHOUT SCIATICA: Primary | ICD-10-CM

## 2023-03-01 NOTE — PROGRESS NOTES
Daily Note     Today's date: 3/1/2023  Patient name: Delilah Perez  : 1980  MRN: [id number]  Referring provider: Tiffany Sarabia, PT  Dx:   Encounter Diagnosis     ICD-10-CM    1  Chronic bilateral low back pain without sciatica  M54 50     G89 29       2  Acute midline low back pain, unspecified whether sciatica present  M54 50           Start Time: 1300  Stop Time: 1345  Total time in clinic (min): 45 minutes    Subjective: "My R leg feels tight and painful down to my foot  It got worse this week and I am working more so more walking, standing, bending, lifting  Nothing makes it better '      Objective: See treatment diary below      Assessment: Tolerated treatment well  Able to complete program without incident  Added ITband stretch with good response  Improve gait at end of session; antalgic gait noted at start of session  Pt noted R ankle feels swollen, no visible signs  Will continue to monitor and progress as able  Patient demonstrated fatigue post treatment and would benefit from continued PT      Plan: Continue per plan of care  Progress treatment as tolerated         Precautions: HTN, anxiety, depression, GERD, HA       Re-eval Date: 3/29    Date 2/15/2023  2/22 2/27 3/1    Visit Count 1 2 3 4    FOTO 2/15/2023        Pain In See IE 6/10 7-8/10 7/10    Pain Out See IE 5/10 7-8/10 6/10        Manuals 2/15/2023  2/22 2/27 3/1                                    Neuro Re-Ed        TA training      TA with BKFO 10x/5" cues        10x ea 5"  Cues   15x/3-5"         2x10/3-5" 15x/3-5"         2x10/3-5" 20x/3-5"        2x10/3-5"    TA with alt heel slide      Dead bugs 10x 5" ea cues  2x10/3-5" 2x10/3-5" 3x10/3-5"    Quadruped alt UE/LE       Mod planks                 Prone laying   2'     JUAN ALBERTO   2'     PPU   1x10     Ther Ex        SLR  2x10/3-5" 2x10/3-5" 2x10/3-5"    bridges  2x10/3-5" 2x10/3-5" 2x10/3-5"    clamshells        HS stretch  3x30" 3x30" 3x30"    piriformis  3x30" 3x30" 3x30" Nu-step   L3 10' L4 10'    ITB stretch    3x30"            Ther Activity 10' total  pt education regarding pathophysiology/pathoanatomy of present pain/sx and condition, role of PT in improving pain/sx and function, pt education regarding activity modification to avoid exacerbation of sx and delayed recovery                         Gait Training                        Modalities prn                             2/15/2023  - HEP was issued and reviewed this date for above noted exercises  Pt demonstrated understanding without incident and without questions/concerns  Will continue to update upcoming

## 2023-03-08 ENCOUNTER — APPOINTMENT (OUTPATIENT)
Dept: PHYSICAL THERAPY | Facility: CLINIC | Age: 43
End: 2023-03-08

## 2023-03-13 ENCOUNTER — APPOINTMENT (OUTPATIENT)
Dept: PHYSICAL THERAPY | Facility: CLINIC | Age: 43
End: 2023-03-13

## 2023-03-15 ENCOUNTER — APPOINTMENT (OUTPATIENT)
Dept: PHYSICAL THERAPY | Facility: CLINIC | Age: 43
End: 2023-03-15

## 2023-03-27 DIAGNOSIS — G43.909 MIGRAINE WITHOUT STATUS MIGRAINOSUS, NOT INTRACTABLE, UNSPECIFIED MIGRAINE TYPE: Primary | ICD-10-CM

## 2023-03-27 DIAGNOSIS — I10 ESSENTIAL HYPERTENSION: ICD-10-CM

## 2023-03-27 RX ORDER — LISINOPRIL 20 MG/1
20 TABLET ORAL DAILY
Qty: 30 TABLET | Refills: 2 | Status: SHIPPED | OUTPATIENT
Start: 2023-03-27

## 2023-03-27 RX ORDER — SUMATRIPTAN 50 MG/1
50 TABLET, FILM COATED ORAL ONCE AS NEEDED
Qty: 10 TABLET | Refills: 5 | Status: SHIPPED | OUTPATIENT
Start: 2023-03-27

## 2023-04-22 ENCOUNTER — HOSPITAL ENCOUNTER (EMERGENCY)
Facility: HOSPITAL | Age: 43
Discharge: HOME | End: 2023-04-22
Attending: EMERGENCY MEDICINE

## 2023-04-22 ENCOUNTER — APPOINTMENT (EMERGENCY)
Dept: NON INVASIVE DIAGNOSTICS | Facility: HOSPITAL | Age: 43
End: 2023-04-22

## 2023-04-22 VITALS
SYSTOLIC BLOOD PRESSURE: 137 MMHG | HEART RATE: 79 BPM | DIASTOLIC BLOOD PRESSURE: 79 MMHG | OXYGEN SATURATION: 96 % | TEMPERATURE: 97.9 F | WEIGHT: 260 LBS | BODY MASS INDEX: 44.39 KG/M2 | RESPIRATION RATE: 16 BRPM | HEIGHT: 64 IN

## 2023-04-22 DIAGNOSIS — M79.604 RIGHT LEG PAIN: Primary | ICD-10-CM

## 2023-04-22 DIAGNOSIS — M79.89 LEG SWELLING: ICD-10-CM

## 2023-04-22 RX ORDER — CELECOXIB 200 MG/1
200 CAPSULE ORAL 2 TIMES DAILY
Qty: 20 CAPSULE | Refills: 0 | Status: SHIPPED | OUTPATIENT
Start: 2023-04-22

## 2023-04-22 RX ORDER — ACETAMINOPHEN 325 MG/1
975 TABLET ORAL ONCE
Status: COMPLETED | OUTPATIENT
Start: 2023-04-22 | End: 2023-04-22

## 2023-04-22 RX ORDER — IBUPROFEN 600 MG/1
600 TABLET ORAL ONCE
Status: COMPLETED | OUTPATIENT
Start: 2023-04-22 | End: 2023-04-22

## 2023-04-22 RX ADMIN — ACETAMINOPHEN 975 MG: 325 TABLET ORAL at 08:13

## 2023-04-22 RX ADMIN — IBUPROFEN 600 MG: 600 TABLET ORAL at 08:13

## 2023-04-22 NOTE — DISCHARGE INSTRUCTIONS
Your vascular ultrasound is negative which is reassuring against DVT  If symptoms persist, follow up with your PCP - a repeat ultrasound may be performed as an outpatient if symptoms persist  Your symptoms may be due to muscle spasms, nerve pain, or knee pain  Take 650mg tylenol every 6 hours for pain as well as the celebrex  Avoid motrin/aleve and other OTC pain medications  Apply cold packs or ice for 20 minutes every 3-4 hours  Perform light activity as tolerated      Return at anytime for any reason

## 2023-04-22 NOTE — ED PROVIDER NOTES
History  Chief Complaint   Patient presents with   • Leg Pain     Pt ambulatory to ed c/o right leg pain w/ swelling; worsening over work last night; denies injury     70-year-old female with past medical history of high blood pressure, presenting with right lower extremity pain and swelling behind the right knee  Patient states symptoms began several days ago, worse overnight  She did send a message to her primary provider and was instructed to come to the emergency department for vascular ultrasound to rule out DVT  She denies chest pain, shortness of breath, recent travel, recent hospitalization, recent surgery, true PE or DVT  Not on OCPs  Prior to Admission Medications   Prescriptions Last Dose Informant Patient Reported? Taking?    SUMAtriptan (IMITREX) 50 mg tablet   No No   Sig: Take 1 tablet (50 mg total) by mouth once as needed for migraine for up to 1 dose may repeat in 2 hours if necessary   albuterol (Ventolin HFA) 90 mcg/act inhaler   No No   Sig: Inhale 2 puffs every 6 (six) hours as needed for wheezing   buPROPion (Wellbutrin XL) 150 mg 24 hr tablet   No No   Sig: Take 1 tablet (150 mg total) by mouth every morning   cyclobenzaprine (FLEXERIL) 10 mg tablet   No No   Sig: Take 1 tablet (10 mg total) by mouth 3 (three) times a day as needed for muscle spasms   lisinopril (ZESTRIL) 20 mg tablet   No No   Sig: Take 1 tablet (20 mg total) by mouth daily   omeprazole (PriLOSEC) 20 mg delayed release capsule   No No   Sig: take 1 capsule by mouth once daily   traZODone (DESYREL) 50 mg tablet   No No   Sig: take 1 tablet by mouth at bedtime      Facility-Administered Medications: None       Past Medical History:   Diagnosis Date   • Anxiety    • Asthma    • Complete spontaneous  without complication     FIVE TIMES   • Concussion    • Depression    • Headache    • Hypertension    • Insomnia    • Normal delivery     DAUGHTER   • Normal delivery     SON   • Pelvic inflammatory disease RESOLVED: 1/8/16   • Post concussion syndrome 11/11/2019   • Recurrent pregnancy loss (CODE)    • Thyroid nodule 3/2/2019       Past Surgical History:   Procedure Laterality Date   • CHOLECYSTECTOMY     • CYSTOSCOPY N/A 12/28/2016    Procedure: Simran Prater;  Surgeon: Yolande Tarango MD;  Location[facility];  Service:    • HYSTERECTOMY     • LAPAROSCOPIC CHOLECYSTECTOMY      RESOLVED: 2006   • NASAL SEPTUM SURGERY      REESOLVED: 1999; DEVIATION   • UT LAPAROSCOPY W TOTAL HYSTERECTOMY UTERUS 250 GM/< N/A 12/28/2016    Procedure: HYSTERECTOMY LAPAROSCOPIC TOTAL ;  Surgeon: Yolande Tarango MD;  [facility];  Service: Gynecology   • SALPINGECTOMY Bilateral 12/28/2016    Procedure: SALPINGECTOMY;  Surgeon: Yolande Tarango MD;  [facility];  Service:    • TONSILLECTOMY      RESOLVED: 1997   • TUBAL LIGATION      RESOLVED: 1/22/14       Family History   Problem Relation Age of Onset   • Venous thrombosis Mother         ACUTE; OF DEEP VESSELS OF THE DISTAL LOWER EXTREMITY   • COPD Mother    • Heart disease Mother    • Hypertension Mother    • Mental illness Mother    • Kidney disease Maternal Grandmother    • Breast cancer Paternal Grandmother 36   • Diabetes Paternal Grandfather         MELLITUS   • Colon cancer Father 61   • No Known Problems Daughter    • No Known Problems Maternal Grandfather    • No Known Problems Maternal Aunt    • No Known Problems Maternal Aunt    • No Known Problems Son      I have reviewed and agree with the history as documented      E-Cigarette/Vaping   • E-Cigarette Use Never User      E-Cigarette/Vaping Substances   • Nicotine No    • THC No    • CBD No    • Flavoring No    • Other No    • Unknown No      Social History     Tobacco Use   • Smoking status: Former     Types: Cigars   • Smokeless tobacco: Never   Vaping Use   • Vaping Use: Never used   Substance Use Topics   • Alcohol use: Yes     Comment: socially   • Drug use: No       Review of Systems    Physical Exam  Physical Exam  Vitals and nursing note reviewed  Constitutional:       Appearance: She is well-developed  HENT:      Head: Normocephalic and atraumatic  Eyes:      Conjunctiva/sclera: Conjunctivae normal       Pupils: Pupils are equal, round, and reactive to light  Neck:      Trachea: No tracheal deviation  Cardiovascular:      Rate and Rhythm: Normal rate and regular rhythm  Heart sounds: Normal heart sounds  No murmur heard  Pulmonary:      Effort: Pulmonary effort is normal  No respiratory distress  Breath sounds: Normal breath sounds  No wheezing or rales  Abdominal:      General: Bowel sounds are normal  There is no distension  Palpations: Abdomen is soft  Tenderness: There is no abdominal tenderness  Musculoskeletal:         General: No deformity  Cervical back: Normal range of motion and neck supple  Comments: Mild tenderness behind the right knee, no erythema, no joint effusion   Skin:     General: Skin is warm and dry  Capillary Refill: Capillary refill takes less than 2 seconds  Neurological:      Mental Status: She is alert and oriented to person, place, and time  Sensory: No sensory deficit     Psychiatric:         Judgment: Judgment normal          Vital Signs  ED Triage Vitals   Temperature Pulse Respirations Blood Pressure SpO2   04/22/23 0807 04/22/23 0800 04/22/23 0800 04/22/23 0800 04/22/23 0800   97 9 °F (36 6 °C) (!) 107 18 (!) 193/84 92 %      Temp Source Heart Rate Source Patient Position - Orthostatic VS BP Location FiO2 (%)   04/22/23 0807 04/22/23 0800 04/22/23 0800 04/22/23 0800 --   Tympanic Monitor Lying Left arm       Pain Score       04/22/23 0800       5           Vitals:    04/22/23 0808 04/22/23 0815 04/22/23 0845 04/22/23 0915   BP: 136/77 137/84 126/72 137/79   Pulse:  95 87 79   Patient Position - Orthostatic VS:  Lying Lying Lying         Visual Acuity      ED Medications  Medications   ibuprofen (MOTRIN) tablet 600 mg (600 mg Oral Given 4/22/23 4730)   acetaminophen (TYLENOL) tablet 975 mg (975 mg Oral Given 4/22/23 0813)       Diagnostic Studies  Results Reviewed     None                 VAS lower limb venous duplex study, unilateral/limited    (Results Pending)              Procedures  Procedures         ED Course         Vascular ultrasound negative                        SBIRT 22yo+    Flowsheet Row Most Recent Value   Initial Alcohol Screen: US AUDIT-C     1  How often do you have a drink containing alcohol? 0 Filed at: 04/22/2023 0800   2  How many drinks containing alcohol do you have on a typical day you are drinking? 0 Filed at: 04/22/2023 0800   3a  Male UNDER 65: How often do you have five or more drinks on one occasion? 0 Filed at: 04/22/2023 0800   3b  FEMALE Any Age, or MALE 65+: How often do you have 4 or more drinks on one occassion? 0 Filed at: 04/22/2023 0800   Audit-C Score 0 Filed at: 04/22/2023 0800   CRICKET: How many times in the past year have you    Used an illegal drug or used a prescription medication for non-medical reasons? Never Filed at: 04/22/2023 0800                    Medical Decision Making  61-year-old female presents with right-sided posterior knee pain and swelling    DDx includes DVT  Could also be bakers cyst  Possibly radicular        Leg swelling: acute illness or injury  Right leg pain: acute illness or injury  Risk  OTC drugs  Prescription drug management  Disposition  Final diagnoses:   Right leg pain   Leg swelling     Time reflects when diagnosis was documented in both MDM as applicable and the Disposition within this note     Time User Action Codes Description Comment    4/22/2023  8:55 AM Shanna Amezcua [O42 799] Right leg pain     4/22/2023  8:56 AM Shanna Nation Add [M79 [id number]] Leg swelling       ED Disposition     ED Disposition   Discharge    Condition   Stable    Date/Time   Sat Apr 22, 2023  9:16 AM    Comment   Alejandra Cope discharge to home/self care                 Follow-up Information     Follow up With Specialties Details Why Contact Info    Sonia Hernandez PA-C Family Medicine, Internal Medicine, Physician Assistant  As needed [address]  [phone]            Discharge Medication List as of 4/22/2023  9:22 AM      START taking these medications    Details   celecoxib (CeleBREX) 200 mg capsule Take 1 capsule (200 mg total) by mouth 2 (two) times a day, Starting Sat 4/22/2023, Normal         CONTINUE these medications which have NOT CHANGED    Details   albuterol (Ventolin HFA) 90 mcg/act inhaler Inhale 2 puffs every 6 (six) hours as needed for wheezing, Starting Wed 12/22/2021, Normal      buPROPion (Wellbutrin XL) 150 mg 24 hr tablet Take 1 tablet (150 mg total) by mouth every morning, Starting Wed 11/16/2022, Normal      cyclobenzaprine (FLEXERIL) 10 mg tablet Take 1 tablet (10 mg total) by mouth 3 (three) times a day as needed for muscle spasms, Starting Wed 2/1/2023, Normal      lisinopril (ZESTRIL) 20 mg tablet Take 1 tablet (20 mg total) by mouth daily, Starting Mon 3/27/2023, Normal      omeprazole (PriLOSEC) 20 mg delayed release capsule take 1 capsule by mouth once daily, Normal      SUMAtriptan (IMITREX) 50 mg tablet Take 1 tablet (50 mg total) by mouth once as needed for migraine for up to 1 dose may repeat in 2 hours if necessary, Starting Mon 3/27/2023, Normal      traZODone (DESYREL) 50 mg tablet take 1 tablet by mouth at bedtime, Normal             No discharge procedures on file      PDMP Review     None          ED Provider  Electronically Signed by           Naomi Roland DO  04/22/23 9166

## 2023-04-24 ENCOUNTER — VBI (OUTPATIENT)
Dept: INTERNAL MEDICINE CLINIC | Facility: CLINIC | Age: 43
End: 2023-04-24

## 2023-04-24 NOTE — TELEPHONE ENCOUNTER
Melly Innedilberto    ED Visit Information     Ed visit date: 4/22/23  Diagnosis Description: leg pain rt  In Network? Yes [facility]  Discharge status: Home  Discharged with meds ? No  Number of ED visits to date: 1  ED Severity:3     Outreach Information    Outreach successful: NO 2  Date letter mailed:n/a  Date Finalized:4/25/23    Care Coordination    Follow up appointment with pcp: no not able to reach patient  Transportation issues ?  No    Value [employer]

## 2023-05-13 DIAGNOSIS — E66.01 CLASS 3 SEVERE OBESITY DUE TO EXCESS CALORIES WITHOUT SERIOUS COMORBIDITY WITH BODY MASS INDEX (BMI) OF 40.0 TO 44.9 IN ADULT (HCC): ICD-10-CM

## 2023-05-13 RX ORDER — BUPROPION HYDROCHLORIDE 150 MG/1
TABLET ORAL
Qty: 30 TABLET | Refills: 5 | Status: SHIPPED | OUTPATIENT
Start: 2023-05-13

## 2023-05-21 ENCOUNTER — HOSPITAL ENCOUNTER (EMERGENCY)
Facility: HOSPITAL | Age: 43
Discharge: HOME | End: 2023-05-21
Attending: EMERGENCY MEDICINE

## 2023-05-21 ENCOUNTER — APPOINTMENT (EMERGENCY)
Dept: RADIOLOGY | Facility: HOSPITAL | Age: 43
End: 2023-05-21

## 2023-05-21 VITALS
HEART RATE: 73 BPM | TEMPERATURE: 97.9 F | DIASTOLIC BLOOD PRESSURE: 83 MMHG | SYSTOLIC BLOOD PRESSURE: 133 MMHG | RESPIRATION RATE: 18 BRPM | OXYGEN SATURATION: 97 %

## 2023-05-21 DIAGNOSIS — S93.401A RIGHT ANKLE SPRAIN: ICD-10-CM

## 2023-05-21 DIAGNOSIS — M25.571 RIGHT ANKLE PAIN: ICD-10-CM
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
## 2023-05-21 DIAGNOSIS — M25.561 RIGHT KNEE PAIN: ICD-10-CM

## 2023-05-21 RX ORDER — NAPROXEN 500 MG/1
500 TABLET ORAL ONCE
Status: COMPLETED | OUTPATIENT
Start: 2023-05-21 | End: 2023-05-21

## 2023-05-21 RX ORDER — ACETAMINOPHEN 325 MG/1
650 TABLET ORAL ONCE
Status: COMPLETED | OUTPATIENT
Start: 2023-05-21 | End: 2023-05-21

## 2023-05-21 RX ADMIN — ACETAMINOPHEN 650 MG: 325 TABLET ORAL at 03:40

## 2023-05-21 RX ADMIN — NAPROXEN 500 MG: 500 TABLET ORAL at 03:40

## 2023-05-21 NOTE — ED PROVIDER NOTES
History  Chief Complaint   Patient presents with   • Fall     Patient was at work, says ankle gave out and she fell down 3 steps  Denies headstrike,no thinners  44-year-old female presents the emergency department for evaluation of right lower extremity pain after a fall down 3 steps  Patient reports she was at work when her right leg gave out causing her to fall down approximately 3 steps  She denies striking her head or losing consciousness  Current complaint at this time is right ankle pain and swelling  She is able to bear weight, but is painful  She denies any numbness or tingling  No headache, neck or back pain  She denies any other complaints at this time  She states her right leg does give out occasionally  She does not take any blood thinners or antiplatelet medications  Prior to Admission Medications   Prescriptions Last Dose Informant Patient Reported? Taking?    SUMAtriptan (IMITREX) 50 mg tablet   No No   Sig: Take 1 tablet (50 mg total) by mouth once as needed for migraine for up to 1 dose may repeat in 2 hours if necessary   albuterol (Ventolin HFA) 90 mcg/act inhaler   No No   Sig: Inhale 2 puffs every 6 (six) hours as needed for wheezing   buPROPion (WELLBUTRIN XL) 150 mg 24 hr tablet   No No   Sig: take 1 tablet by mouth every morning   celecoxib (CeleBREX) 200 mg capsule   No No   Sig: Take 1 capsule (200 mg total) by mouth 2 (two) times a day   cyclobenzaprine (FLEXERIL) 10 mg tablet   No No   Sig: Take 1 tablet (10 mg total) by mouth 3 (three) times a day as needed for muscle spasms   lisinopril (ZESTRIL) 20 mg tablet   No No   Sig: Take 1 tablet (20 mg total) by mouth daily   omeprazole (PriLOSEC) 20 mg delayed release capsule   No No   Sig: take 1 capsule by mouth once daily   traZODone (DESYREL) 50 mg tablet   No No   Sig: take 1 tablet by mouth at bedtime      Facility-Administered Medications: None       Past Medical History:   Diagnosis Date   • Anxiety    • Asthma • Complete spontaneous  without complication     FIVE TIMES   • Concussion    • Depression    • Headache    • Hypertension    • Insomnia    • Normal delivery     DAUGHTER   • Normal delivery     SON   • Pelvic inflammatory disease     RESOLVED: 18   • Post concussion syndrome 2019   • Recurrent pregnancy loss (CODE)    • Thyroid nodule 3/2/2019       Past Surgical History:   Procedure Laterality Date   • CHOLECYSTECTOMY     • CYSTOSCOPY N/A 2016    Procedure: CYSTOSCOPY;  Surgeon: Mike Grimes MD;  [facility];  Service:    • HYSTERECTOMY     • LAPAROSCOPIC CHOLECYSTECTOMY      RESOLVED:    • NASAL SEPTUM SURGERY      REESOLVED: ; DEVIATION   • NV LAPAROSCOPY W TOTAL HYSTERECTOMY UTERUS 250 GM/< N/A 2016    Procedure: HYSTERECTOMY LAPAROSCOPIC TOTAL ;  Surgeon: Mike Grimes MD;  [facility];  Service: Gynecology   • SALPINGECTOMY Bilateral 2016    Procedure: SALPINGECTOMY;  Surgeon: Mike Grimes MD;  [facility];  Service:    • TONSILLECTOMY      RESOLVED:    • TUBAL LIGATION      RESOLVED: 16       Family History   Problem Relation Age of Onset   • Venous thrombosis Mother         ACUTE; OF DEEP VESSELS OF THE DISTAL LOWER EXTREMITY   • COPD Mother    • Heart disease Mother    • Hypertension Mother    • Mental illness Mother    • Kidney disease Maternal Grandmother    • Breast cancer Paternal Grandmother 36   • Diabetes Paternal Grandfather         MELLITUS   • Colon cancer Father 61   • No Known Problems Daughter    • No Known Problems Maternal Grandfather    • No Known Problems Maternal Aunt    • No Known Problems Maternal Aunt    • No Known Problems Son      I have reviewed and agree with the history as documented      E-Cigarette/Vaping   • E-Cigarette Use Never User      E-Cigarette/Vaping Substances   • Nicotine No    • THC No    • CBD No    • Flavoring No    • Other No    • Unknown No      Social History     Tobacco Use   • Smoking status: Former     Types: Cigars   • Smokeless tobacco: Never   Vaping Use   • Vaping Use: Never used   Substance Use Topics   • Alcohol use: Yes     Comment: socially   • Drug use: No       Review of Systems   Constitutional: Negative for chills and fever  HENT: Negative for ear pain and sore throat  Eyes: Negative for pain and visual disturbance  Respiratory: Negative for cough and shortness of breath  Cardiovascular: Negative for chest pain and palpitations  Gastrointestinal: Negative for abdominal pain and vomiting  Genitourinary: Negative for dysuria and hematuria  Musculoskeletal: Positive for arthralgias  Negative for back pain  Skin: Negative for color change and rash  Neurological: Negative for seizures and syncope  All other systems reviewed and are negative  Physical Exam  Physical Exam  Vitals and nursing note reviewed  Constitutional:       General: She is not in acute distress  HENT:      Head: Normocephalic and atraumatic  Right Ear: External ear normal       Left Ear: External ear normal       Nose: Nose normal       Mouth/Throat:      Mouth: Mucous membranes are moist    Eyes:      Extraocular Movements: Extraocular movements intact  Conjunctiva/sclera: Conjunctivae normal       Pupils: Pupils are equal, round, and reactive to light  Cardiovascular:      Rate and Rhythm: Normal rate and regular rhythm  Pulses: Normal pulses  Pulmonary:      Effort: Pulmonary effort is normal  No respiratory distress  Breath sounds: No stridor  Musculoskeletal:         General: No deformity  Normal range of motion  Cervical back: Normal range of motion and neck supple  Right hip: Normal       Right knee: Bony tenderness present  No swelling, deformity, effusion, erythema, ecchymosis, lacerations or crepitus  Normal range of motion  No tenderness  No LCL laxity, MCL laxity, ACL laxity or PCL laxity  Normal alignment and normal patellar mobility   Normal pulse       Right ankle: Tenderness present over the lateral malleolus and medial malleolus  No base of 5th metatarsal or proximal fibula tenderness  Anterior drawer test negative  Normal pulse  Right Achilles Tendon: Normal       Right foot: Normal capillary refill  Swelling and bony tenderness present  No prominent metatarsal heads, laceration or crepitus  Normal pulse  Legs:    Skin:     General: Skin is warm and dry  Capillary Refill: Capillary refill takes less than 2 seconds  Neurological:      General: No focal deficit present  Mental Status: She is alert and oriented to person, place, and time  Sensory: No sensory deficit     Psychiatric:         Mood and Affect: Mood normal          Behavior: Behavior normal          Vital Signs  ED Triage Vitals   Temperature Pulse Respirations Blood Pressure SpO2   05/21/23 0323 05/21/23 0323 05/21/23 0323 05/21/23 0326 05/21/23 0323   97 9 °F (36 6 °C) 73 18 133/83 97 %      Temp Source Heart Rate Source Patient Position - Orthostatic VS BP Location FiO2 (%)   05/21/23 0323 -- 05/21/23 0323 05/21/23 0323 --   Tympanic  Sitting Left arm       Pain Score       --                  Vitals:    05/21/23 0323 05/21/23 0326   BP:  133/83   Pulse: 73    Patient Position - Orthostatic VS: Sitting          Visual Acuity  Visual Acuity    Flowsheet Row Most Recent Value   L Pupil Size (mm) 3   R Pupil Size (mm) 3          ED Medications  Medications   acetaminophen (TYLENOL) tablet 650 mg (650 mg Oral Given 5/21/23 0340)   naproxen (NAPROSYN) tablet 500 mg (500 mg Oral Given 5/21/23 0340)       Diagnostic Studies  Results Reviewed     None                 XR ankle 3+ views RIGHT   ED Interpretation by Darren Patterson MD (05/21 0423)   No acute osseous abnormality      XR foot 3+ views RIGHT   ED Interpretation by Darren Patterson MD (05/21 0423)   No acute osseous abnormality      XR knee 4+ vw right injury   ED Interpretation by Darren Patterson MD (05/21 3999)   No acute osseous normality                 Procedures  Procedures         ED Course  ED Course as of 05/21/23 0531   Sun May 21, 2023   4453 Patient was placed in an Aircast by myself, she tolerated well without complication, right lower extremity continues to be neurovascular intact  She will be discharged home  SBIRT 20yo+    Flowsheet Row Most Recent Value   Initial Alcohol Screen: US AUDIT-C     1  How often do you have a drink containing alcohol? 0 Filed at: 05/21/2023 0327   2  How many drinks containing alcohol do you have on a typical day you are drinking? 0 Filed at: 05/21/2023 0327   3a  Male UNDER 65: How often do you have five or more drinks on one occasion? 0 Filed at: 05/21/2023 0327   3b  FEMALE Any Age, or MALE 65+: How often do you have 4 or more drinks on one occassion? 0 Filed at: 05/21/2023 0327   Audit-C Score 0 Filed at: 05/21/2023 2860   CRICKET: How many times in the past year have you    Used an illegal drug or used a prescription medication for non-medical reasons? Never Filed at: 05/21/2023 0327                    Medical Decision Making  33-year-old female with right knee, ankle, and foot pain after mechanical fall  Differential diagnosis includes but is not limited to fracture, dislocation, contusion, sprain  We will get x-rays and treat symptomatically  X-rays are negative for any acute findings per my interpretation  Patient was placed in a Aircast and discharged home with crutches  She was advised to follow-up with podiatry  She was given strict return precautions  Fall, initial encounter: acute illness or injury  Right ankle pain: acute illness or injury  Right ankle sprain: acute illness or injury  Right knee pain: acute illness or injury  Amount and/or Complexity of Data Reviewed  Radiology: ordered and independent interpretation performed  Risk  OTC drugs  Prescription drug management            Disposition  Final diagnoses: Fall, initial encounter   Right knee pain   Right ankle pain   Right ankle sprain     Time reflects when diagnosis was documented in both MDM as applicable and the Disposition within this note     Time User Action Codes Description Comment    5/21/2023  4:31 AM Check, Jerod Amezcua [W19  Melodie Payne Fall, initial encounter     5/21/2023  4:31 AM Check, Jerod Amezcua [H09 [id number]] Right knee pain     5/21/2023  4:31 AM Check, Jerod Amezcua [M25 [id number]] Right ankle pain     5/21/2023  4:31 AM Check, Cordell Doherty [L74 935A] Right ankle sprain       ED Disposition     ED Disposition   Discharge    Condition   Stable    Date/Time   Sun May 21, 2023  4:31 AM    Comment   Alejandra Cope discharge to home/self care                 Follow-up Information     Follow up With Specialties Details Why Contact Info Additional [address] Emergency Department Emergency Medicine  If symptoms worsen [address]  [place] [id number] [phone] [facility] Emergency Department, [address] [place] [address] Rafael Cordoba, [address] [facility]    Melba Aragon PA-C Family Medicine, Internal Medicine, Physician Assistant Schedule an appointment as soon as possible for a visit   [address] [place] [address] Podiatry Schedule an appointment as soon as possible for a visit   [address] [place] [address] [phone]           Discharge Medication List as of 5/21/2023  4:41 AM      CONTINUE these medications which have NOT CHANGED    Details   albuterol (Ventolin HFA) 90 mcg/act inhaler Inhale 2 puffs every 6 (six) hours as needed for wheezing, Starting Wed 12/22/2021, Normal      buPROPion (WELLBUTRIN XL) 150 mg 24 hr tablet take 1 tablet by mouth every morning, Normal      celecoxib (CeleBREX) 200 mg capsule Take 1 capsule (200 mg total) by mouth 2 (two) times a day, Starting Sat 4/22/2023, Normal cyclobenzaprine (FLEXERIL) 10 mg tablet Take 1 tablet (10 mg total) by mouth 3 (three) times a day as needed for muscle spasms, Starting Wed 2/1/2023, Normal      lisinopril (ZESTRIL) 20 mg tablet Take 1 tablet (20 mg total) by mouth daily, Starting Mon 3/27/2023, Normal      omeprazole (PriLOSEC) 20 mg delayed release capsule take 1 capsule by mouth once daily, Normal      SUMAtriptan (IMITREX) 50 mg tablet Take 1 tablet (50 mg total) by mouth once as needed for migraine for up to 1 dose may repeat in 2 hours if necessary, Starting Mon 3/27/2023, Normal      traZODone (DESYREL) 50 mg tablet take 1 tablet by mouth at bedtime, Normal             No discharge procedures on file      PDMP Review     None          ED Provider  Electronically Signed by           Alyssa Peoples MD  05/21/23 6484

## 2023-05-21 NOTE — Clinical Note
Yohannes Copeland was seen and treated in our emergency department on 5/21/2023  Diagnosis:     Alejandra  may return to work on return date  She may return on this date: 05/24/2023         If you have any questions or concerns, please don't hesitate to call        Otilia Carroll MD    ______________________________           _______________          _______________  Hospital Representative                              Date                                Time

## 2023-05-21 NOTE — DISCHARGE INSTRUCTIONS
Recommend Tylenol and ibuprofen every 6 hours as needed for pain, you may also apply ice to the painful area    You may put weight on the right lower extremity as tolerated  Follow-up with podiatry    Return for any worsening symptoms

## 2023-07-10 DIAGNOSIS — I10 ESSENTIAL HYPERTENSION: ICD-10-CM

## 2023-07-10 RX ORDER — LISINOPRIL 20 MG/1
TABLET ORAL
Qty: 30 TABLET | Refills: 2 | Status: SHIPPED | OUTPATIENT
Start: 2023-07-10

## 2023-07-25 DIAGNOSIS — K21.00 GASTROESOPHAGEAL REFLUX DISEASE WITH ESOPHAGITIS: ICD-10-CM

## 2023-07-25 RX ORDER — OMEPRAZOLE 20 MG/1
20 CAPSULE, DELAYED RELEASE ORAL DAILY
Qty: 30 CAPSULE | Refills: 1 | Status: SHIPPED | OUTPATIENT
Start: 2023-07-25

## 2023-08-09 ENCOUNTER — OFFICE VISIT (OUTPATIENT)
Dept: INTERNAL MEDICINE CLINIC | Facility: CLINIC | Age: 43
End: 2023-08-09
Payer: COMMERCIAL

## 2023-08-09 VITALS
HEART RATE: 78 BPM | TEMPERATURE: 97.6 F | DIASTOLIC BLOOD PRESSURE: 110 MMHG | WEIGHT: 258.25 LBS | HEIGHT: 64 IN | SYSTOLIC BLOOD PRESSURE: 150 MMHG | BODY MASS INDEX: 44.09 KG/M2 | OXYGEN SATURATION: 98 %

## 2023-08-09 DIAGNOSIS — L30.1 DYSHIDROTIC ECZEMA: ICD-10-CM

## 2023-08-09 DIAGNOSIS — R07.2 PRECORDIAL PAIN: Primary | ICD-10-CM

## 2023-08-09 DIAGNOSIS — I10 ESSENTIAL HYPERTENSION: ICD-10-CM

## 2023-08-09 PROCEDURE — 99214 OFFICE O/P EST MOD 30 MIN: CPT | Performed by: PHYSICIAN ASSISTANT

## 2023-08-09 PROCEDURE — 93000 ELECTROCARDIOGRAM COMPLETE: CPT | Performed by: PHYSICIAN ASSISTANT

## 2023-08-09 RX ORDER — TRIAMCINOLONE ACETONIDE 5 MG/G
CREAM TOPICAL 2 TIMES DAILY
Qty: 15 G | Refills: 0 | Status: SHIPPED | OUTPATIENT
Start: 2023-08-09

## 2023-08-09 RX ORDER — LISINOPRIL 30 MG/1
30 TABLET ORAL DAILY
Qty: 30 TABLET | Refills: 2 | Status: SHIPPED | OUTPATIENT
Start: 2023-08-09

## 2023-08-09 NOTE — ASSESSMENT & PLAN NOTE
Not at goal and pt symptomatic with flushing and headache. Increase lisinopril to 30mg and recheck 1 month.  EKG in office is normal

## 2023-08-09 NOTE — ASSESSMENT & PLAN NOTE
Start triamcinolone. Directions for use and possible side effects discussed and patient verbalized understanding of these.

## 2023-08-09 NOTE — PROGRESS NOTES
Name: Alie Kilpatrick      : 1980      MRN: [id number]  Encounter Provider: Karyle Bowens, PA-C  Encounter Date: 2023   Encounter department: [address] [place], [place] [address]     1. Precordial pain  Assessment & Plan:  EKG in office is normal. Will get stress echo and treat according to results. Lisinopril also increased. Orders:  -     Echo stress test, exercise; Future; Expected date: 2023    2. Dyshidrotic eczema  Assessment & Plan:  Start triamcinolone. Directions for use and possible side effects discussed and patient verbalized understanding of these. Orders:  -     triamcinolone (KENALOG) 0.5 % cream; Apply topically 2 (two) times a day    3. Essential hypertension  Comments:  bp 130/90 today Rx for Maxide provided and pt advised to check her bp home and return in 1 mos  Assessment & Plan:  Not at goal and pt symptomatic with flushing and headache. Increase lisinopril to 30mg and recheck 1 month. EKG in office is normal    Orders:  -     lisinopril (ZESTRIL) 30 mg tablet; Take 1 tablet (30 mg total) by mouth daily      BMI Counseling: Body mass index is 44.33 kg/m². The BMI is above normal. Nutrition recommendations include decreasing portion sizes, consuming healthier snacks and limiting drinks that contain sugar. Rationale for BMI follow-up plan is due to patient being overweight or obese. Subjective      Pt presents for routine visit. She is up to date with labs. Mammogram is scheduled. She is noting issues with headaches anytime she awakens from sleeping, whether it be for bed or a nap. The headache is generalized and occurring every time she awakens for the past month. BP today is elevated and she is flushed. She also notes a rash comprised of small, firm, raised bumps on her fingers that are slightly itchy. Lastly she notes issues with precordial chest pain and squeezing sensation. This has been happening near daily for the past few weeks.  No provoking events. Symptoms last a few minutes. At times it stops her in the middle of what she is doing due to the intensity. Denies cough, palpitations or SOB. EKG in office shows NSR. Review of Systems   Constitutional: Negative for chills and fever. HENT: Negative for congestion, ear pain, hearing loss, postnasal drip, rhinorrhea, sinus pressure, sinus pain, sore throat and trouble swallowing. Eyes: Negative for pain and visual disturbance. Respiratory: Negative for cough, chest tightness, shortness of breath and wheezing. Cardiovascular: Positive for chest pain. Negative for palpitations and leg swelling. Gastrointestinal: Negative for abdominal pain, blood in stool, constipation, diarrhea, nausea and vomiting. Endocrine: Negative for cold intolerance, heat intolerance, polydipsia, polyphagia and polyuria. Genitourinary: Negative for difficulty urinating, dysuria, flank pain and urgency. Musculoskeletal: Negative for arthralgias, back pain, gait problem and myalgias. Skin: Positive for rash. Allergic/Immunologic: Negative. Neurological: Positive for headaches. Negative for dizziness, weakness and light-headedness. Hematological: Negative. Psychiatric/Behavioral: Negative for behavioral problems, dysphoric mood and sleep disturbance. The patient is not nervous/anxious.         Current Outpatient Medications on File Prior to Visit   Medication Sig   • albuterol (Ventolin HFA) 90 mcg/act inhaler Inhale 2 puffs every 6 (six) hours as needed for wheezing   • buPROPion (WELLBUTRIN XL) 150 mg 24 hr tablet take 1 tablet by mouth every morning   • omeprazole (PriLOSEC) 20 mg delayed release capsule Take 1 capsule (20 mg total) by mouth daily   • traZODone (DESYREL) 50 mg tablet take 1 tablet by mouth at bedtime   • [DISCONTINUED] lisinopril (ZESTRIL) 20 mg tablet take 1 tablet by mouth once daily   • celecoxib (CeleBREX) 200 mg capsule Take 1 capsule (200 mg total) by mouth 2 (two) times a day   • [DISCONTINUED] cyclobenzaprine (FLEXERIL) 10 mg tablet Take 1 tablet (10 mg total) by mouth 3 (three) times a day as needed for muscle spasms   • [DISCONTINUED] SUMAtriptan (IMITREX) 50 mg tablet Take 1 tablet (50 mg total) by mouth once as needed for migraine for up to 1 dose may repeat in 2 hours if necessary       Objective     BP (!) 150/110 (BP Location: Left arm, Patient Position: Sitting)   Pulse 78   Temp 97.6 °F (36.4 °C)   Ht 5' 4" (1.626 m)   Wt 117 kg (258 lb 4 oz)   LMP 12/01/2016   SpO2 98%   BMI 44.33 kg/m²     Physical Exam  Vitals and nursing note reviewed. Constitutional:       General: She is not in acute distress. Appearance: She is well-developed. She is obese. She is not diaphoretic. HENT:      Head: Normocephalic and atraumatic. Right Ear: External ear normal.      Left Ear: External ear normal.      Nose: Nose normal.      Mouth/Throat:      Pharynx: No oropharyngeal exudate. Eyes:      General: No scleral icterus. Right eye: No discharge. Left eye: No discharge. Conjunctiva/sclera: Conjunctivae normal.      Pupils: Pupils are equal, round, and reactive to light. Neck:      Thyroid: No thyromegaly. Cardiovascular:      Rate and Rhythm: Normal rate and regular rhythm. Heart sounds: Normal heart sounds. No murmur heard. No friction rub. No gallop. Pulmonary:      Effort: Pulmonary effort is normal. No respiratory distress. Breath sounds: Normal breath sounds. No wheezing or rales. Abdominal:      General: Bowel sounds are normal. There is no distension. Palpations: Abdomen is soft. Tenderness: There is no abdominal tenderness. Musculoskeletal:         General: No tenderness or deformity. Normal range of motion. Cervical back: Normal range of motion and neck supple. Skin:     General: Skin is warm and dry. Neurological:      Mental Status: She is alert and oriented to person, place, and time. Cranial Nerves: No cranial nerve deficit. Psychiatric:         Behavior: Behavior normal.         Thought Content:  Thought content normal.         Judgment: Judgment normal.       Reba Mckinley PA-C

## 2023-08-09 NOTE — ASSESSMENT & PLAN NOTE
EKG in office is normal. Will get stress echo and treat according to results. Lisinopril also increased.

## 2023-08-15 DIAGNOSIS — J45.20 MILD INTERMITTENT ASTHMA, UNSPECIFIED WHETHER COMPLICATED: ICD-10-CM

## 2023-08-16 ENCOUNTER — HOSPITAL ENCOUNTER (OUTPATIENT)
Dept: NON INVASIVE DIAGNOSTICS | Facility: HOSPITAL | Age: 43
Discharge: HOME | End: 2023-08-16
Payer: COMMERCIAL

## 2023-08-16 VITALS
OXYGEN SATURATION: 99 % | RESPIRATION RATE: 20 BRPM | SYSTOLIC BLOOD PRESSURE: 116 MMHG | DIASTOLIC BLOOD PRESSURE: 70 MMHG | WEIGHT: 258 LBS | HEART RATE: 77 BPM | BODY MASS INDEX: 44.05 KG/M2 | HEIGHT: 64 IN

## 2023-08-16 DIAGNOSIS — R07.2 PRECORDIAL PAIN: ICD-10-CM

## 2023-08-16 LAB
ARRHY DURING EX: NORMAL
CHEST PAIN STATEMENT: NORMAL
MAX DIASTOLIC BP: 100 MMHG
MAX HEART RATE: 162 BPM
MAX HR PERCENT: 90 %
MAX HR: 162 BPM
MAX PREDICTED HEART RATE: 179 BPM
MAX. SYSTOLIC BP: 200 MMHG
PROTOCOL NAME: NORMAL
RATE PRESSURE PRODUCT: NORMAL
SL CV LV EF: 55
SL CV STRESS RECOVERY BP: NORMAL MMHG
SL CV STRESS RECOVERY HR: 107 BPM
SL CV STRESS RECOVERY O2 SAT: 99 %
SL CV STRESS STAGE REACHED: 2
STRESS ANGINA INDEX: 0
STRESS BASELINE BP: NORMAL MMHG
STRESS BASELINE HR: 77 BPM
STRESS O2 SAT REST: 99 %
STRESS PEAK HR: 162 BPM
STRESS POST ESTIMATED WORKLOAD: 7 METS
STRESS POST EXERCISE DUR MIN: 6 MIN
STRESS POST EXERCISE DUR SEC: 0 SEC
STRESS POST O2 SAT PEAK: 97 %
STRESS POST PEAK BP: 200 MMHG
TARGET HR FORMULA: NORMAL
TEST INDICATION: NORMAL
TIME IN EXERCISE PHASE: NORMAL

## 2023-08-16 PROCEDURE — 93350 STRESS TTE ONLY: CPT

## 2023-08-16 PROCEDURE — 93350 STRESS TTE ONLY: CPT | Performed by: INTERNAL MEDICINE

## 2023-08-16 RX ORDER — ALBUTEROL SULFATE 90 UG/1
2 AEROSOL, METERED RESPIRATORY (INHALATION) EVERY 6 HOURS PRN
Qty: 18 G | Refills: 2 | Status: SHIPPED | OUTPATIENT
Start: 2023-08-16

## 2023-08-16 RX ADMIN — PERFLUTREN 0.6 ML/MIN: 6.52 INJECTION, SUSPENSION INTRAVENOUS at 09:40

## 2023-08-23 ENCOUNTER — HOSPITAL ENCOUNTER (EMERGENCY)
Facility: HOSPITAL | Age: 43
Discharge: HOME | End: 2023-08-23
Attending: FAMILY MEDICINE | Admitting: FAMILY MEDICINE
Payer: COMMERCIAL

## 2023-08-23 VITALS
WEIGHT: 258 LBS | SYSTOLIC BLOOD PRESSURE: 146 MMHG | DIASTOLIC BLOOD PRESSURE: 101 MMHG | TEMPERATURE: 97 F | RESPIRATION RATE: 20 BRPM | HEIGHT: 63 IN | HEART RATE: 88 BPM | BODY MASS INDEX: 45.71 KG/M2 | OXYGEN SATURATION: 97 %

## 2023-08-23 DIAGNOSIS — K64.4 EXTERNAL HEMORRHOID: Primary | ICD-10-CM

## 2023-08-23 DIAGNOSIS — L30.1 DYSHIDROTIC ECZEMA: ICD-10-CM

## 2023-08-23 DIAGNOSIS — K62.89 RECTAL PAIN: ICD-10-CM

## 2023-08-23 PROCEDURE — 99284 EMERGENCY DEPT VISIT MOD MDM: CPT | Performed by: FAMILY MEDICINE

## 2023-08-23 PROCEDURE — 99283 EMERGENCY DEPT VISIT LOW MDM: CPT

## 2023-08-23 RX ORDER — HYDROCORTISONE 25 MG/G
CREAM TOPICAL 4 TIMES DAILY PRN
Status: ACTIVE | OUTPATIENT
Start: 2023-08-23

## 2023-08-23 RX ORDER — TRIAMCINOLONE ACETONIDE 5 MG/G
CREAM TOPICAL 2 TIMES DAILY
Qty: 15 G | Refills: 0 | Status: SHIPPED | OUTPATIENT
Start: 2023-08-23

## 2023-08-23 NOTE — DISCHARGE INSTRUCTIONS
Manage your symptoms:   Apply ice on your anus for 15 to 20 minutes every hour or as directed. Use an ice pack, or put crushed ice in a plastic bag. Cover it with a towel before you apply it to your anus. Ice helps prevent tissue damage and decreases swelling and pain. Take a sitz bath. Fill a bathtub with 4 to 6 inches of warm water. You may also use a sitz bath pan that fits inside a toilet bowl. Sit in the sitz bath for 15 minutes. Do this 3 times a day, and after each bowel movement. The warm water can help decrease pain and swelling. Keep your anal area clean. Gently wash the area with warm water daily. Soap may irritate the area. After a bowel movement, wipe with moist towelettes or wet toilet paper. Dry toilet paper can irritate the area.

## 2023-08-23 NOTE — ED PROVIDER NOTES
History  Chief Complaint   Patient presents with   • Rectal Pain     Pt presents to ER via private vehicle for reports of external rectal pain x5 days associated with intermittent constipation, had a large sized bowel movement afterwards with worsening pain after. HPI  72-year-old female presented with complaint of rectal pain. Patient states she has a history of external hemorrhoid and has been straining a lot in the past few days. States had a bowel movement yesterday and this morning and feels that if she have a irritated. She denies any blood when wiping herself. Patient states that she has not been able to use the cream or have a sitz bath. Denies any abdominal pain nausea vomiting or diarrhea. Prior to Admission Medications   Prescriptions Last Dose Informant Patient Reported? Taking?    albuterol (Ventolin HFA) 90 mcg/act inhaler   No No   Sig: Inhale 2 puffs every 6 (six) hours as needed for wheezing   buPROPion (WELLBUTRIN XL) 150 mg 24 hr tablet   No No   Sig: take 1 tablet by mouth every morning   lisinopril (ZESTRIL) 30 mg tablet   No No   Sig: Take 1 tablet (30 mg total) by mouth daily   omeprazole (PriLOSEC) 20 mg delayed release capsule   No No   Sig: Take 1 capsule (20 mg total) by mouth daily   traZODone (DESYREL) 50 mg tablet   No No   Sig: take 1 tablet by mouth at bedtime   triamcinolone (KENALOG) 0.5 % cream   No No   Sig: Apply topically 2 (two) times a day   triamcinolone (KENALOG) 0.5 % cream   No Yes   Sig: Apply topically 2 (two) times a day      Facility-Administered Medications: None       Past Medical History:   Diagnosis Date   • Anxiety    • Asthma    • Complete spontaneous  without complication     FIVE TIMES   • Concussion    • Depression    • Headache    • Hypertension    • Insomnia    • Normal delivery     DAUGHTER   • Normal delivery     SON   • Pelvic inflammatory disease     RESOLVED: 18   • Post concussion syndrome 2019   • Recurrent pregnancy loss (CODE)    • Thyroid nodule 3/2/2019       Past Surgical History:   Procedure Laterality Date   • CHOLECYSTECTOMY     • CYSTOSCOPY N/A 12/28/2016    Procedure: Carron Score;  Surgeon: Isabella Ernandez MD;  Location: [facility];  Service:    • HYSTERECTOMY     • LAPAROSCOPIC CHOLECYSTECTOMY      RESOLVED: 2006   • NASAL SEPTUM SURGERY      REESOLVED: 1999; DEVIATION   • OK LAPAROSCOPY W TOTAL HYSTERECTOMY UTERUS 250 GM/< N/A 12/28/2016    Procedure: HYSTERECTOMY LAPAROSCOPIC TOTAL ;  Surgeon: Isabella Ernandez MD;  Location: [facility];  Service: Gynecology   • SALPINGECTOMY Bilateral 12/28/2016    Procedure: SALPINGECTOMY;  Surgeon: Isabella Ernandez MD;  Location: [facility];  Service:    • TONSILLECTOMY      RESOLVED: 1997   • TUBAL LIGATION      RESOLVED: 1/22/14       Family History   Problem Relation Age of Onset   • Venous thrombosis Mother         ACUTE; OF DEEP VESSELS OF THE DISTAL LOWER EXTREMITY   • COPD Mother    • Heart disease Mother    • Hypertension Mother    • Mental illness Mother    • Kidney disease Maternal Grandmother    • Breast cancer Paternal Grandmother 36   • Diabetes Paternal Grandfather         MELLITUS   • Colon cancer Father 61   • No Known Problems Daughter    • No Known Problems Maternal Grandfather    • No Known Problems Maternal Aunt    • No Known Problems Maternal Aunt    • No Known Problems Son      I have reviewed and agree with the history as documented. E-Cigarette/Vaping   • E-Cigarette Use Never User      E-Cigarette/Vaping Substances   • Nicotine No    • THC No    • CBD No    • Flavoring No    • Other No    • Unknown No      Social History     Tobacco Use   • Smoking status: Former     Types: Cigars   • Smokeless tobacco: Never   Vaping Use   • Vaping Use: Never used   Substance Use Topics   • Alcohol use: Yes     Comment: socially   • Drug use: No       Review of Systems   Constitutional: Negative for chills and fever. HENT: Negative for rhinorrhea and sore throat.     Eyes: Negative for visual disturbance. Respiratory: Negative for cough and shortness of breath. Cardiovascular: Negative for chest pain and leg swelling. Gastrointestinal: Positive for rectal pain. Negative for abdominal pain, diarrhea, nausea and vomiting. Genitourinary: Negative for dysuria. Musculoskeletal: Negative for back pain and myalgias. Skin: Negative for rash. Neurological: Negative for dizziness and headaches. Psychiatric/Behavioral: Negative for confusion. All other systems reviewed and are negative. Physical Exam  Physical Exam  Vitals and nursing note reviewed. Constitutional:       Appearance: She is well-developed. HENT:      Head: Normocephalic and atraumatic. Right Ear: External ear normal.      Left Ear: External ear normal.      Nose: Nose normal.      Mouth/Throat:      Mouth: Mucous membranes are moist.      Pharynx: No oropharyngeal exudate. Eyes:      General: No scleral icterus. Right eye: No discharge. Left eye: No discharge. Conjunctiva/sclera: Conjunctivae normal.      Pupils: Pupils are equal, round, and reactive to light. Cardiovascular:      Rate and Rhythm: Normal rate and regular rhythm. Pulses: Normal pulses. Heart sounds: Normal heart sounds. Pulmonary:      Effort: Pulmonary effort is normal. No respiratory distress. Breath sounds: Normal breath sounds. No wheezing. Abdominal:      General: Bowel sounds are normal.      Palpations: Abdomen is soft. Genitourinary:     Rectum: External hemorrhoid present. No anal fissure or internal hemorrhoid. Normal anal tone. Comments: No thrombosed hemorrhoid noted. Musculoskeletal:         General: Normal range of motion. Cervical back: Normal range of motion and neck supple. Lymphadenopathy:      Cervical: No cervical adenopathy. Skin:     General: Skin is warm and dry. Capillary Refill: Capillary refill takes less than 2 seconds.    Neurological:      General: No focal deficit present. Mental Status: She is alert and oriented to person, place, and time. Psychiatric:         Mood and Affect: Mood normal.         Behavior: Behavior normal.         Vital Signs  ED Triage Vitals [08/23/23 0750]   Temperature Pulse Respirations Blood Pressure SpO2   (!) 97 °F (36.1 °C) 104 18 (!) 149/102 97 %      Temp Source Heart Rate Source Patient Position - Orthostatic VS BP Location FiO2 (%)   Temporal Monitor Sitting Left arm --      Pain Score       7           Vitals:    08/23/23 0750 08/23/23 0800   BP: (!) 149/102 (!) 146/101   Pulse: 104 88   Patient Position - Orthostatic VS: Sitting          Visual Acuity      ED Medications  Medications   hydrocortisone (ANUSOL-HC) 2.5 % rectal cream (has no administration in time range)       Diagnostic Studies  Results Reviewed     None                 No orders to display              Procedures  Procedures         ED Course                               SBIRT 20yo+    Flowsheet Row Most Recent Value   Initial Alcohol Screen: US AUDIT-C     1. How often do you have a drink containing alcohol? 1 Filed at: 08/23/2023 0752   2. How many drinks containing alcohol do you have on a typical day you are drinking? 1 Filed at: 08/23/2023 0752   3b. FEMALE Any Age, or MALE 65+: How often do you have 4 or more drinks on one occassion? 0 Filed at: 08/23/2023 9150   Audit-C Score 2 Filed at: 08/23/2023 1306   CRICKET: How many times in the past year have you. .. Used an illegal drug or used a prescription medication for non-medical reasons? Never Filed at: 08/23/2023 3025                    Medical Decision Making  57-year-old female presented with complaint of rectal pain. Patient says she has a history of hemorrhoid and has been constipated. Patient states she was straining and felt pain after the bowel movement which prompted this ED visit. Patient states that she has been applying cream but did not help much. History is taken from patient.   Patient is nontoxic-appearing awake alert oriented GCS 15. At this time patient does not need lab or imaging. External and internal exam shows external hemorrhoid which are not plan at this time slightly irritated. Recommending sitz bath increase fiber in her diet increase hydration continue with NSAID. Patient provided with a colorectal referral recommending follow-up. Strict precaution regarding return if symptoms improve or worsen return back to the ED. Verbalized understand the plan discharge home. Risk  Prescription drug management. Disposition  Final diagnoses:   External hemorrhoid   Rectal pain     Time reflects when diagnosis was documented in both MDM as applicable and the Disposition within this note     Time User Action Codes Description Comment    8/23/2023  8:29 AM Clyda Katherine Add [K64.4] External hemorrhoid     8/23/2023  8:29 AM Clyda Katherine Add [K62.89] Rectal pain     8/23/2023  8:31 AM Clyda Katherine Add [L30.1] Dyshidrotic eczema       ED Disposition     ED Disposition   Discharge    Condition   Stable    Date/Time   Wed Aug 23, 2023  8:28 AM    Comment   Alejandra Cope discharge to home/self care.                Follow-up Information     Follow up With Specialties Details Why Contact Info    Hui Candelario PA-C Family Medicine, Internal Medicine, Physician Assistant Schedule an appointment as soon as possible for a visit in 2 days If symptoms worsen [address]  [phone]            Discharge Medication List as of 8/23/2023  8:34 AM      CONTINUE these medications which have CHANGED    Details   triamcinolone (KENALOG) 0.5 % cream Apply topically 2 (two) times a day, Starting Wed 8/23/2023, Normal         CONTINUE these medications which have NOT CHANGED    Details   albuterol (Ventolin HFA) 90 mcg/act inhaler Inhale 2 puffs every 6 (six) hours as needed for wheezing, Starting Wed 8/16/2023, Normal      buPROPion (WELLBUTRIN XL) 150 mg 24 hr tablet take 1 tablet by mouth every morning, Normal      lisinopril (ZESTRIL) 30 mg tablet Take 1 tablet (30 mg total) by mouth daily, Starting Wed 8/9/2023, Normal      omeprazole (PriLOSEC) 20 mg delayed release capsule Take 1 capsule (20 mg total) by mouth daily, Starting Tue 7/25/2023, Normal      traZODone (DESYREL) 50 mg tablet take 1 tablet by mouth at bedtime, Normal                 PDMP Review     None          ED Provider  Electronically Signed by           Jazzmine Garza MD  08/23/23 9772

## 2023-08-24 ENCOUNTER — OFFICE VISIT (OUTPATIENT)
Dept: GASTROENTEROLOGY | Facility: CLINIC | Age: 43
End: 2023-08-24
Payer: COMMERCIAL

## 2023-08-24 ENCOUNTER — HOSPITAL ENCOUNTER (OUTPATIENT)
Dept: RADIOLOGY | Age: 43
Discharge: HOME | End: 2023-08-24
Payer: COMMERCIAL

## 2023-08-24 VITALS
SYSTOLIC BLOOD PRESSURE: 140 MMHG | WEIGHT: 255 LBS | OXYGEN SATURATION: 98 % | HEIGHT: 63 IN | DIASTOLIC BLOOD PRESSURE: 98 MMHG | HEART RATE: 64 BPM | RESPIRATION RATE: 18 BRPM | BODY MASS INDEX: 45.18 KG/M2

## 2023-08-24 VITALS — WEIGHT: 255 LBS | BODY MASS INDEX: 45.18 KG/M2 | HEIGHT: 63 IN

## 2023-08-24 DIAGNOSIS — K64.9 HEMORRHOIDS, UNSPECIFIED HEMORRHOID TYPE: ICD-10-CM

## 2023-08-24 DIAGNOSIS — K64.4 EXTERNAL HEMORRHOID: Primary | ICD-10-CM

## 2023-08-24 DIAGNOSIS — Z12.31 SCREENING MAMMOGRAM FOR BREAST CANCER: ICD-10-CM

## 2023-08-24 DIAGNOSIS — K58.2 IRRITABLE BOWEL SYNDROME WITH BOTH CONSTIPATION AND DIARRHEA: ICD-10-CM

## 2023-08-24 DIAGNOSIS — K62.89 RECTAL PAIN: ICD-10-CM

## 2023-08-24 DIAGNOSIS — Z80.0 FAMILY HISTORY OF COLON CANCER: ICD-10-CM

## 2023-08-24 DIAGNOSIS — R07.89 ATYPICAL CHEST PAIN: ICD-10-CM

## 2023-08-24 DIAGNOSIS — K21.9 GASTROESOPHAGEAL REFLUX DISEASE, UNSPECIFIED WHETHER ESOPHAGITIS PRESENT: ICD-10-CM

## 2023-08-24 DIAGNOSIS — K62.89 RECTAL PAIN: Primary | ICD-10-CM

## 2023-08-24 PROCEDURE — 77063 BREAST TOMOSYNTHESIS BI: CPT

## 2023-08-24 PROCEDURE — 99214 OFFICE O/P EST MOD 30 MIN: CPT | Performed by: PHYSICIAN ASSISTANT

## 2023-08-24 PROCEDURE — 77067 SCR MAMMO BI INCL CAD: CPT

## 2023-08-24 RX ORDER — HYDROCORTISONE ACETATE 25 MG/1
25 SUPPOSITORY RECTAL 2 TIMES DAILY
Qty: 12 SUPPOSITORY | Refills: 0 | Status: SHIPPED | OUTPATIENT
Start: 2023-08-24 | End: 2023-08-24

## 2023-08-24 RX ORDER — HYDROCORTISONE ACETATE PRAMOXINE HCL 2.5; 1 G/100G; G/100G
CREAM TOPICAL 2 TIMES DAILY
Qty: 4 G | Refills: 1 | Status: SHIPPED | OUTPATIENT
Start: 2023-08-24 | End: 2023-08-31

## 2023-08-24 RX ORDER — FAMOTIDINE 40 MG/1
40 TABLET, FILM COATED ORAL
Qty: 30 TABLET | Refills: 5 | Status: SHIPPED | OUTPATIENT
Start: 2023-08-24

## 2023-08-24 NOTE — PATIENT INSTRUCTIONS
1 things is that we need to regulate your bowel habits a bit more. Please  a fiber supplement like Metamucil, Benefiber, Citrucel, psyllium husk. Start with 1 dose daily mixed into 8 ounces of a liquid of your choice.     Scheduled date of EGD(as of today): 08/28/2023  Physician performing EGD: jenaro  Location of EGD: [facility]  Instructions reviewed with patient by: david  Clearances:  none

## 2023-08-24 NOTE — PROGRESS NOTES
[facility] - Outpatient Follow-up Note  Diallo Flank 41 y.o. female MRN: [id number]  Encounter: [id number]    ASSESSMENT AND PLAN:      1. Rectal pain  2. Irritable bowel syndrome with both constipation and diarrhea  3. Hemorrhoids, unspecified hemorrhoid type    Patient with a known history of hemorrhoids and suspected irritable bowel syndrome given alternating bowel habits ranging from constipation to urgent loose stools at times associated with abdominal cramping. Colonoscopy from 2022 with diverticuli and internal hemorrhoids only. We did review that anorectal discomfort is likely secondary to known hemorrhoids, though other etiologies are possible to include an anal fissure given degree of pain. We discussed the importance of regulating her bowel habits to ensure no straining or constipation. We will start patient on fiber supplement daily. If inadequate, would recommend including MiraLAX in her regimen as well. We reviewed for her anorectal discomfort, we will trial topical therapy with Anusol suppositories as well as external cream.  She has been referred to colorectal surgery and I have encouraged her to follow through with this. She is up-to-date on colonoscopy and at this time we will defer additional colonoscopy, though it is noted she would be due for surveillance colonoscopy in 2027. - Ambulatory Referral to Colorectal Surgery; Future    4. Gastroesophageal reflux disease, unspecified whether esophagitis present  5. Atypical chest pain    Patient has been dealing with breakthrough upper GI symptoms and atypical chest pain despite PPI daily. She recently underwent a stress echo which was within normal limits. We reviewed that her symptoms may be indicative of treatment refractory GERD, esophagitis, dysmotility, functional dyspepsia, other etiology. She is postcholecystectomy.   We reviewed given treatment refractory symptoms we will proceed with an upper endoscopy at this time. We will add in nightly famotidine to see if this improves symptom control. Additional recommendations to be made pending endoscopic evaluation. Risks associated with endoscopic evaluation discussed with patient today, including but not limited to bleeding, infection, perforation, and organ injury, all of which are low. Pt demonstrates understanding and is agreeable to the plan. - EGD; Future  - famotidine (PEPCID) 40 MG tablet; Take 1 tablet (40 mg total) by mouth daily at bedtime  Dispense: 30 tablet; Refill: 5    6. Family history of colon cancer    Patient unfortunately has a first-degree relative in father with colorectal cancer. She did not have any polyps on her colonoscopy in 2022. She will be due for a repeat colonoscopy in 2027. We will follow-up with patient after endoscopic evaluation. ______________________________________________________________________    SUBJECTIVE: Patient is a 41 y.o. female who presents today for follow-up regarding hemorrhoids and rectal pain. Pmhx sig for asthma, HTN, migraine, MDD. Hx of CCY. Pt is new to me. She was last evaluated by GI in 03/2022 for alternating constipation and diarrhea as well as hemorrhoids. She was dealing with rectal bleeding at that time. She underwent a colonoscopy at that time which demonstrated internal hemorrhoids only. She was most recently evaluated in the emergency department on 8/23/2023 with complaints of rectal pain. She has been having excess straining and blood on the wipe. She had an rectal examination which noted external hemorrhoids. She was referred to colorectal surgery. At today's office visit, she notes that she is continuing to alternate between straining and constipation as well as urgent loose stools. She can go several days in between having a bowel movement and will then need to strain. At times depending on oral intake she will have postprandial fecal urgency.   She notes some abdominal cramping prior to defecation. Most recently she has been dealing with intense rectal pain with and without defecation. She did have 1 episode of BRBPR for which she sought attention in the ER. No melenic stool. No nocturnal BMs. Her father succumbed to colon cancer at age 61. Pertaining to the upper GI tract, she has been dealing with some breakthrough heartburn, excess belching and flatus. She endorses nausea though no emesis. She shares that with reclining in the evening she will get some burning and a tickle in her throat. She is having atypical chest pain, and recently underwent a cardiac work-up which was negative. She was started on a PPI for the symptoms though is uncertain if it has been helpful. She denies any dysphagia or odynophagia. She denies any new rashes, mouth ulcers, joint erythema or swelling, or recurrent eye infections. NSAIDs: avoids   Etoh: occasionally  Tobacco: none     2023: Hb 13.7, MCV 92, Plt 272     Endoscopic history:   Colon: 2022:  single small diverticulum in the sigmoid, small internal hemorrhoids    Review of Systems   Otherwise Per hPI    Historical Information   Past Medical History:   Diagnosis Date   • Anxiety    • Asthma    • Complete spontaneous  without complication     FIVE TIMES   • Concussion    • Depression    • Headache    • Hypertension    • Insomnia    • Normal delivery     DAUGHTER   • Normal delivery     SON   • Pelvic inflammatory disease     RESOLVED: 18   • Post concussion syndrome 2019   • Recurrent pregnancy loss (CODE)    • Thyroid nodule 3/2/2019     Past Surgical History:   Procedure Laterality Date   • CHOLECYSTECTOMY     • CYSTOSCOPY N/A 2016    Procedure: CYSTOSCOPY;  Surgeon:  Louis Ortiz MD;  Location: [facility];  Service:    • HYSTERECTOMY     • LAPAROSCOPIC CHOLECYSTECTOMY      RESOLVED:    • NASAL SEPTUM SURGERY      REESOLVED: ; DEVIATION   • NM LAPAROSCOPY W TOTAL HYSTERECTOMY UTERUS 250 GM/< N/A 12/28/2016    Procedure: HYSTERECTOMY LAPAROSCOPIC TOTAL ;  Surgeon: Veronica Murcia MD;  Location: [facility];  Service: Gynecology   • SALPINGECTOMY Bilateral 12/28/2016    Procedure: SALPINGECTOMY;  Surgeon: Veronica Murcia MD;  Location: [facility];  Service:    • TONSILLECTOMY      RESOLVED: 1997   • TUBAL LIGATION      RESOLVED: 1/22/14     Social History   Social History     Substance and Sexual Activity   Alcohol Use Yes    Comment: socially     Social History     Substance and Sexual Activity   Drug Use No     Social History     Tobacco Use   Smoking Status Former   • Types: Cigars   Smokeless Tobacco Never     Family History   Problem Relation Age of Onset   • Venous thrombosis Mother         ACUTE; OF DEEP VESSELS OF THE DISTAL LOWER EXTREMITY   • COPD Mother    • Heart disease Mother    • Hypertension Mother    • Mental illness Mother    • Kidney disease Maternal Grandmother    • Breast cancer Paternal Grandmother 36   • Diabetes Paternal Grandfather         MELLITUS   • Colon cancer Father 61   • No Known Problems Daughter    • No Known Problems Maternal Grandfather    • No Known Problems Maternal Aunt    • No Known Problems Maternal Aunt    • No Known Problems Son        Meds/Allergies       Current Outpatient Medications:   •  albuterol (Ventolin HFA) 90 mcg/act inhaler  •  buPROPion (WELLBUTRIN XL) 150 mg 24 hr tablet  •  lisinopril (ZESTRIL) 30 mg tablet  •  omeprazole (PriLOSEC) 20 mg delayed release capsule  •  traZODone (DESYREL) 50 mg tablet  •  triamcinolone (KENALOG) 0.5 % cream  No current facility-administered medications for this visit. Facility-Administered Medications Ordered in Other Visits:   •  hydrocortisone (ANUSOL-HC) 2.5 % rectal cream, , Topical, 4x Daily PRN    No Known Allergies        Objective     Blood pressure 140/98, pulse 64, resp. rate 18, height 5' 3" (1.6 m), weight 116 kg (255 lb), last menstrual period 12/01/2016, SpO2 98 %, not currently breastfeeding.  Body mass index is 45.17 kg/m². Physical Exam  Vitals and nursing note reviewed. Constitutional:       Appearance: Normal appearance. She is obese. HENT:      Head: Normocephalic and atraumatic. Eyes:      General: No scleral icterus. Conjunctiva/sclera: Conjunctivae normal.   Pulmonary:      Effort: Pulmonary effort is normal. No respiratory distress. Abdominal:      General: Bowel sounds are normal. There is no distension. Palpations: Abdomen is soft. Tenderness: There is no abdominal tenderness. There is no guarding. Genitourinary:     Comments: External hemorrhoid noted  Skin:     General: Skin is warm and dry. Coloration: Skin is not jaundiced. Neurological:      General: No focal deficit present. Mental Status: She is alert and oriented to person, place, and time. Psychiatric:         Mood and Affect: Mood normal.         Behavior: Behavior normal.         Lab Results:   No visits with results within 1 Day(s) from this visit. Latest known visit with results is:   Hospital Outpatient Visit on 08/16/2023   Component Date Value   • Baseline HR 08/16/2023 77    • Baseline BP 08/16/2023 116/70    • O2 sat rest 08/16/2023 99    • Stress peak HR 08/16/2023 162    • Post peak BP 08/16/2023 200    • Rate Pressure Product 08/16/2023 32,400.0    • O2 sat peak 08/16/2023 97    • Recovery HR 08/16/2023 107    • Recovery BP 08/16/2023 134/76    • O2 sat recovery 08/16/2023 99    • Max HR 08/16/2023 162    • Max HR Percent 08/16/2023 90    • Exercise duration (min) 08/16/2023 6    • Exercise duration (sec) 08/16/2023 0    • Estimated workload 08/16/2023 7.0    • Angina Index 08/16/2023 0    • Stress Stage Reached 08/16/2023 2.0    • LV EF 08/16/2023 55    • Protocol Name 08/16/2023 JEROME    • Time In Exercise Phase 08/16/2023 00:06:00    • MAX.  SYSTOLIC BP 33/16/9744 646    • Max Diastolic Bp 51/56/0047 903    • Max Heart Rate 08/16/2023 162    • Max Predicted Heart Rate 08/16/2023 179    • Reason for Termination 08/16/2023                      Value:Target Heart Rate Achieved  Fatigue  Dyspnea     • Test Indication 08/16/2023 Screening for CAD    • Target Hr Formular 08/16/2023 (220 - Age)*85%    • Arrhy During Ex 08/16/2023 ventricular premature beats    • Chest Pain Statement 08/16/2023 none          Radiology Results:   Echo stress test, exercise    Result Date: 8/16/2023  Narrative: •  Stress ECG: Arrhythmias during recovery: rare PVCs. There was no diagnostic evidence of ischemia on stress ECG. . •  Left Ventricle: The left ventricular ejection fraction is 55%. Systolic function is normal. Wall motion is normal. •  Post Stress Echo: Left ventricle cavity has normal reduction in size post-stress. The left ventricle systolic function is normal post-stress. While image quality was technically difficult due to patient's body habitus, there was no diagnostic evidence of ischemia on stress echocardiographic imaging. Stress strip    Result Date: 8/16/2023  Narrative: Confirmed by Julien Lopez ([phone]),  Homar Arroyo ([id number]) on 8/16/2023 11:09:02 AM    Neptali Amanda PA-C    **Please note:  Dictation voice to text software may have been used in the creation of this record. Occasional wrong word or “sound alike” substitutions may have occurred due to the inherent limitations of voice recognition software. Read the chart carefully and recognize, using context, where substitutions have occurred. **

## 2023-08-28 ENCOUNTER — HOSPITAL ENCOUNTER (OUTPATIENT)
Dept: GASTROENTEROLOGY | Facility: HOSPITAL | Age: 43
Setting detail: OUTPATIENT SURGERY
Discharge: HOME | End: 2023-08-28
Payer: COMMERCIAL

## 2023-08-28 ENCOUNTER — ANESTHESIA EVENT (OUTPATIENT)
Dept: GASTROENTEROLOGY | Facility: HOSPITAL | Age: 43
End: 2023-08-28

## 2023-08-28 ENCOUNTER — ANESTHESIA (OUTPATIENT)
Dept: GASTROENTEROLOGY | Facility: HOSPITAL | Age: 43
End: 2023-08-28

## 2023-08-28 VITALS
RESPIRATION RATE: 18 BRPM | WEIGHT: 255 LBS | DIASTOLIC BLOOD PRESSURE: 68 MMHG | SYSTOLIC BLOOD PRESSURE: 120 MMHG | HEART RATE: 69 BPM | BODY MASS INDEX: 45.18 KG/M2 | TEMPERATURE: 97.1 F | HEIGHT: 63 IN | OXYGEN SATURATION: 97 %

## 2023-08-28 DIAGNOSIS — K21.9 GASTROESOPHAGEAL REFLUX DISEASE, UNSPECIFIED WHETHER ESOPHAGITIS PRESENT: ICD-10-CM

## 2023-08-28 PROCEDURE — 88305 TISSUE EXAM BY PATHOLOGIST: CPT | Performed by: STUDENT IN AN ORGANIZED HEALTH CARE EDUCATION/TRAINING PROGRAM

## 2023-08-28 RX ORDER — SODIUM CHLORIDE, SODIUM LACTATE, POTASSIUM CHLORIDE, CALCIUM CHLORIDE 600; 310; 30; 20 MG/100ML; MG/100ML; MG/100ML; MG/100ML
INJECTION, SOLUTION INTRAVENOUS CONTINUOUS PRN
Status: DISCONTINUED | OUTPATIENT
Start: 2023-08-28 | End: 2023-08-28

## 2023-08-28 RX ORDER — LIDOCAINE HYDROCHLORIDE 20 MG/ML
INJECTION, SOLUTION EPIDURAL; INFILTRATION; INTRACAUDAL; PERINEURAL AS NEEDED
Status: DISCONTINUED | OUTPATIENT
Start: 2023-08-28 | End: 2023-08-28

## 2023-08-28 RX ORDER — PROPOFOL 10 MG/ML
INJECTION, EMULSION INTRAVENOUS AS NEEDED
Status: DISCONTINUED | OUTPATIENT
Start: 2023-08-28 | End: 2023-08-28

## 2023-08-28 RX ADMIN — PROPOFOL 50 MG: 10 INJECTION, EMULSION INTRAVENOUS at 14:16

## 2023-08-28 RX ADMIN — PROPOFOL 100 MG: 10 INJECTION, EMULSION INTRAVENOUS at 14:15

## 2023-08-28 RX ADMIN — LIDOCAINE HYDROCHLORIDE 60 MG: 20 INJECTION, SOLUTION EPIDURAL; INFILTRATION; INTRACAUDAL; PERINEURAL at 14:15

## 2023-08-28 RX ADMIN — PROPOFOL 50 MG: 10 INJECTION, EMULSION INTRAVENOUS at 14:21

## 2023-08-28 RX ADMIN — SODIUM CHLORIDE, SODIUM LACTATE, POTASSIUM CHLORIDE, AND CALCIUM CHLORIDE: .6; .31; .03; .02 INJECTION, SOLUTION INTRAVENOUS at 14:03

## 2023-08-28 RX ADMIN — PROPOFOL 50 MG: 10 INJECTION, EMULSION INTRAVENOUS at 14:18

## 2023-08-28 NOTE — DISCHARGE INSTRUCTIONS
..Upper Endoscopy   WHAT YOU NEED TO KNOW:   An upper endoscopy is also called an upper gastrointestinal (GI) endoscopy, or an esophagogastroduodenoscopy (EGD). It is a procedure to examine the inside of your esophagus, stomach, and duodenum (first part of the small intestine) with a scope. You may feel bloated, gassy, or have some abdominal discomfort after your procedure. Your throat may be sore for 24 to 36 hours. You may burp or pass gas from air that is still inside your body. DISCHARGE INSTRUCTIONS:   Seek care immediately if:   You have sudden, severe abdominal pain. You have problems swallowing. You have a large amount of black, sticky bowel movements or blood in your bowel movements. You have sudden trouble breathing. You feel weak, lightheaded, or faint or your heart beats faster than normal for you. Contact your healthcare provider if:   You have a fever and chills. You have nausea or are vomiting. Your abdomen is bloated or feels full and hard. You have abdominal pain. You have black, sticky bowel movements or blood in your bowel movements. You have not had a bowel movement for 3 days after your procedure. You have rash or hives. You have questions or concerns about your procedure. Activity:   Do not lift, strain, or run for 24 hours after your procedure. Rest after your procedure. You have been given medicine to relax you. Do not drive or make important decisions until the day after your procedure. Return to your normal activity as directed. Relieve gas and discomfort from bloating by lying on your right side with a heating pad on your abdomen. You may need to take short walks to help the gas move out. Eat small meals until bloating is relieved. Follow up with your healthcare provider as directed: Write down your questions so you remember to ask them during your visits.      If you take a “blood thinner”, please review the specific instructions from your endoscopist about when you should resume it. These can be found in the “Recommendation” and “Your Medication list” sections of this After Visit Summary.

## 2023-08-28 NOTE — ANESTHESIA PREPROCEDURE EVALUATION
Procedure:  EGD    Relevant Problems   CARDIO   (+) Essential hypertension   (+) Migraine   (+) Precordial pain      NEURO/PSYCH   (+) Anxiety   (+) Migraine   (+) Recurrent major depressive disorder, in partial remission (HCC)      PULMONARY   (+) Asthma      Other   (+) Class 3 severe obesity due to excess calories without serious comorbidity with body mass index (BMI) of 40.0 to 44.9 in adult (HCC)     ECHO STRESS TEST   •  Stress ECG: Arrhythmias during recovery: rare PVCs. There was no diagnostic evidence of ischemia on stress ECG..  •  Left Ventricle: The left ventricular ejection fraction is 55%. Systolic function is normal. Wall motion is normal.  •  Post Stress Echo: Left ventricle cavity has normal reduction in size post-stress. The left ventricle systolic function is normal post-stress.     While image quality was technically difficult due to patient's body habitus, there was no diagnostic evidence of ischemia on stress echocardiographic imaging.    Physical Exam    Airway    Mallampati score: II  TM Distance: >3 FB  Neck ROM: full     Dental   No notable dental hx upper dentures,     Cardiovascular  Rhythm: regular, Rate: normal,     Pulmonary  Breath sounds clear to auscultation,     Other Findings        Anesthesia Plan  ASA Score- 3     Anesthesia Type- IV sedation with anesthesia with ASA Monitors.         Additional Monitors:   Airway Plan:           Plan Factors-Exercise tolerance (METS): >4 METS.    Chart reviewed. EKG reviewed. Patient summary reviewed.            Induction-     Postoperative Plan-     Informed Consent- Anesthetic plan and risks discussed with patient.  I personally reviewed this patient with the CRNA. Discussed and agreed on the Anesthesia Plan with the CRNA..

## 2023-08-28 NOTE — ANESTHESIA POSTPROCEDURE EVALUATION
Post-Op Assessment Note    CV Status:  Stable  Pain Score: 0    Pain management: adequate     Mental Status:  Arousable   Hydration Status:  Stable   PONV Controlled:  None   Airway Patency:  Patent      Post Op Vitals Reviewed: Yes      Staff: CRNA         No notable events documented.    BP   122/66   Temp     Pulse  77   Resp   14   SpO2   99%

## 2023-08-28 NOTE — H&P
History and Physical - [facility]  Chaparrita Ayala 41 y.o. female MRN: [id number]      HPI: Chaparrita Ayala is a 41y.o. year old female who presents for EGD for evaluation due to nocturnal GERD symptoms. Prior EGD in 2019 which showed gastritis. Not currently on AP/AC. REVIEW OF SYSTEMS: Per the HPI, and otherwise unremarkable. Historical Information   Past Medical History:   Diagnosis Date   • Anxiety    • Asthma    • Complete spontaneous  without complication     FIVE TIMES   • Concussion    • Depression    • Headache    • Hypertension    • Insomnia    • Normal delivery     DAUGHTER   • Normal delivery     SON   • Pelvic inflammatory disease     RESOLVED: 18   • Post concussion syndrome 2019   • Recurrent pregnancy loss (CODE)    • Thyroid nodule 3/2/2019     Past Surgical History:   Procedure Laterality Date   • CHOLECYSTECTOMY     • CYSTOSCOPY N/A 2016    Procedure: CYSTOSCOPY;  Surgeon: Veronica Murcia MD;  Location: [facility];  Service:    • HYSTERECTOMY     • LAPAROSCOPIC CHOLECYSTECTOMY      RESOLVED:    • NASAL SEPTUM SURGERY      REESOLVED: ; DEVIATION   • CO LAPAROSCOPY W TOTAL HYSTERECTOMY UTERUS 250 GM/< N/A 2016    Procedure: HYSTERECTOMY LAPAROSCOPIC TOTAL ;  Surgeon: Veronica Murcia MD;  [facility];  Service: Gynecology   • SALPINGECTOMY Bilateral 2016    Procedure: SALPINGECTOMY;  Surgeon:  Veronica Murcia MD;  [facility];  Service:    • TONSILLECTOMY      RESOLVED:    • TUBAL LIGATION      RESOLVED: 16     Social History   Social History     Substance and Sexual Activity   Alcohol Use Yes    Comment: socially     Social History     Substance and Sexual Activity   Drug Use No     Social History     Tobacco Use   Smoking Status Former   • Types: Cigars   Smokeless Tobacco Never     Family History   Problem Relation Age of Onset   • Venous thrombosis Mother         ACUTE; OF DEEP VESSELS OF THE DISTAL LOWER EXTREMITY   • COPD Mother    • Heart disease Mother    • Hypertension Mother    • Mental illness Mother    • Colon cancer Father 61   • No Known Problems Daughter    • Kidney disease Maternal Grandmother    • No Known Problems Maternal Grandfather    • Breast cancer Paternal Grandmother 36   • Diabetes Paternal Grandfather         MELLITUS   • No Known Problems Son    • No Known Problems Maternal Aunt    • No Known Problems Maternal Aunt        Meds/Allergies       Current Outpatient Medications:   •  buPROPion (WELLBUTRIN XL) 150 mg 24 hr tablet  •  famotidine (PEPCID) 40 MG tablet  •  hydrocortisone-pramoxine (ANALPRAM-HC) 2.5-1 % rectal cream  •  lisinopril (ZESTRIL) 30 mg tablet  •  omeprazole (PriLOSEC) 20 mg delayed release capsule  •  albuterol (Ventolin HFA) 90 mcg/act inhaler  •  traZODone (DESYREL) 50 mg tablet  •  triamcinolone (KENALOG) 0.5 % cream  No current facility-administered medications for this encounter. Facility-Administered Medications Ordered in Other Encounters:   •  hydrocortisone (ANUSOL-HC) 2.5 % rectal cream, , Topical, 4x Daily PRN    No Known Allergies    Objective     /87   Pulse 78   Temp (!) 96.7 °F (35.9 °C) (Temporal)   Resp 20   Ht 5' 3" (1.6 m)   Wt 116 kg (255 lb)   LMP 12/01/2016   SpO2 97%   BMI 45.17 kg/m²       PHYSICAL EXAM    Gen: NAD  Head: NCAT  CV: RRR  CHEST: Clear  ABD: soft, NT/ND  EXT: no edema      ASSESSMENT/PLAN:  This is a 41y.o. year old female here for EGD, and she is stable and optimized for her procedure.

## 2023-08-29 ENCOUNTER — OFFICE VISIT (OUTPATIENT)
Age: 43
End: 2023-08-29
Payer: COMMERCIAL

## 2023-08-29 VITALS
OXYGEN SATURATION: 99 % | DIASTOLIC BLOOD PRESSURE: 79 MMHG | HEIGHT: 63 IN | HEART RATE: 85 BPM | BODY MASS INDEX: 43.41 KG/M2 | SYSTOLIC BLOOD PRESSURE: 121 MMHG | WEIGHT: 245 LBS

## 2023-08-29 DIAGNOSIS — K64.9 HEMORRHOIDS, UNSPECIFIED HEMORRHOID TYPE: ICD-10-CM

## 2023-08-29 DIAGNOSIS — K62.89 RECTAL PAIN: ICD-10-CM

## 2023-08-29 DIAGNOSIS — K60.1 CHRONIC ANAL FISSURE: Primary | ICD-10-CM

## 2023-08-29 PROCEDURE — 99243 OFF/OP CNSLTJ NEW/EST LOW 30: CPT | Performed by: COLON & RECTAL SURGERY

## 2023-08-29 RX ORDER — HYDROCORTISONE ACETATE 25 MG/1
SUPPOSITORY RECTAL
Qty: 12 SUPPOSITORY | Refills: 0 | OUTPATIENT
Start: 2023-08-29

## 2023-08-29 NOTE — PROGRESS NOTES
Assessment/Plan:  Chronic anterior midline anal fissure    Plan:    Conservative therapy outlined for patient. Fiber supplement daily    RectiCare prior to and after BM    Cardizem ointment    Follow-up in 1 month's time       Diagnoses and all orders for this visit:    Chronic anal fissure    Hemorrhoids, unspecified hemorrhoid type  -     Ambulatory Referral to Colorectal Surgery    Rectal pain  -     Ambulatory Referral to Colorectal Surgery          Subjective:      Patient ID: Eran Pitts is a 41 y.o. female. Patient is a 43-year-old woman with history of severe anal pain following defecation that frequently will last for hours. Patient presents today for evaluation and treatment      The following portions of the patient's history were reviewed and updated as appropriate: allergies, current medications, past family history, past medical history, past social history, past surgical history and problem list.    Review of Systems   Constitutional: Negative for chills and fever. HENT: Negative for ear pain and sore throat. Eyes: Negative for pain and visual disturbance. Respiratory: Negative for cough and shortness of breath. Cardiovascular: Negative for chest pain and palpitations. Gastrointestinal: Negative for abdominal pain and vomiting. Genitourinary: Negative for dysuria and hematuria. Musculoskeletal: Negative for arthralgias and back pain. Skin: Negative for color change and rash. Neurological: Negative for seizures and syncope. All other systems reviewed and are negative. Objective:      /79   Pulse 85   Ht 5' 3" (1.6 m)   Wt 111 kg (245 lb)   LMP 12/01/2016   SpO2 99%   BMI 43.40 kg/m²          Physical Exam  Vitals reviewed. Constitutional:       Appearance: Normal appearance. She is obese. HENT:      Head: Normocephalic and atraumatic. Eyes:      Conjunctiva/sclera: Conjunctivae normal.   Abdominal:      Palpations: Abdomen is soft.    Neurological: Mental Status: She is alert and oriented to person, place, and time. Psychiatric:         Mood and Affect: Mood normal.         Behavior: Behavior normal.         Thought Content: Thought content normal.         Judgment: Judgment normal.         Anorectal exam:    Inspection of the anal margin reveals no external hemorrhoidal disease.     Inspection of the anal canal reveals spasm and an anterior midline anal fissure with some chronic features

## 2023-08-31 PROCEDURE — 88305 TISSUE EXAM BY PATHOLOGIST: CPT | Performed by: STUDENT IN AN ORGANIZED HEALTH CARE EDUCATION/TRAINING PROGRAM

## 2023-09-19 ENCOUNTER — TELEPHONE (OUTPATIENT)
Dept: INTERNAL MEDICINE CLINIC | Facility: CLINIC | Age: 43
End: 2023-09-19

## 2023-09-19 NOTE — TELEPHONE ENCOUNTER
Called patient today to reschedule today's appointment for a 1 month Follow up     She just said she wants to cancel and to let you know she is feeling good and having no issues. She said if she has any issues she will send you a message on [employer] but right now she is feeling pretty good.

## 2023-09-21 ENCOUNTER — OFFICE VISIT (OUTPATIENT)
Dept: URGENT CARE | Facility: CLINIC | Age: 43
End: 2023-09-21
Payer: COMMERCIAL

## 2023-09-21 VITALS
RESPIRATION RATE: 20 BRPM | SYSTOLIC BLOOD PRESSURE: 140 MMHG | HEART RATE: 84 BPM | OXYGEN SATURATION: 98 % | DIASTOLIC BLOOD PRESSURE: 80 MMHG | HEIGHT: 63 IN | BODY MASS INDEX: 45.71 KG/M2 | WEIGHT: 258 LBS | TEMPERATURE: 98 F

## 2023-09-21 DIAGNOSIS — J06.9 UPPER RESPIRATORY INFECTION WITH COUGH AND CONGESTION: Primary | ICD-10-CM

## 2023-09-21 DIAGNOSIS — J02.8 ACUTE PHARYNGITIS DUE TO OTHER SPECIFIED ORGANISMS: ICD-10-CM

## 2023-09-21 DIAGNOSIS — Z20.822 EXPOSURE TO COVID-19 VIRUS: ICD-10-CM

## 2023-09-21 DIAGNOSIS — R05.1 ACUTE COUGH: ICD-10-CM

## 2023-09-21 LAB
S PYO AG THROAT QL: NEGATIVE
SARS-COV-2 AG UPPER RESP QL IA: NEGATIVE
VALID CONTROL: NORMAL

## 2023-09-21 PROCEDURE — 87811 SARS-COV-2 COVID19 W/OPTIC: CPT | Performed by: NURSE PRACTITIONER

## 2023-09-21 PROCEDURE — 87636 SARSCOV2 & INF A&B AMP PRB: CPT | Performed by: NURSE PRACTITIONER

## 2023-09-21 PROCEDURE — 87880 STREP A ASSAY W/OPTIC: CPT | Performed by: NURSE PRACTITIONER

## 2023-09-21 PROCEDURE — 87070 CULTURE OTHR SPECIMN AEROBIC: CPT | Performed by: NURSE PRACTITIONER

## 2023-09-21 PROCEDURE — 99213 OFFICE O/P EST LOW 20 MIN: CPT | Performed by: NURSE PRACTITIONER

## 2023-09-21 NOTE — PATIENT INSTRUCTIONS
Your strep A is negative. You have a throat culture pending. You are to download [email or web address] for the results in 3-4 days. You will be notified if the results are + and an antibiotic will be called in for you. You are to do warm salt water gargles 4 x daily. Drink warm tea with honey and lemon. Take tylenol or motrin as able for pain or fever. Chloraseptic throat spray, cough drops. Do not share utensils. Change your tooth brush in 3 days. Follow up with your PCP in 2-3 days  Go to the ED if symptoms worsen      If the covid is + you are to stop the dayquil and nyquil. You appear to have covid/symptoms. You have a covid test pending. You are to download [employer] for the results in 24-48 hours. You will be notified if results are +. You are to take vitamin C, D3,  plain robitussin for cough. Do not take cough suppressants; you want to take an expectorant. Sleep on your stomach. You are to quarantine as required per CDC guidelines of 5 days. Mask x 10 days if positive. Mask as long as you have symptoms. See your PCP for follow up in 2-3 days. Go to the ED if symptoms worsen or are severe.    You are to call your PCP if your results are + to discuss Paxlovid treatment

## 2023-09-21 NOTE — PROGRESS NOTES
[facility]        NAME: Consuelo Quevedo is a 41 y.o. female  : 1980    MRN: [id number]  DATE: 2023  TIME: 1:11 PM    Assessment and Plan   Upper respiratory infection with cough and congestion [J06.9]  1. Upper respiratory infection with cough and congestion  Throat culture    amoxicillin-clavulanate (AUGMENTIN) 875-125 mg per tablet      2. Acute pharyngitis due to other specified organisms  POCT rapid strepA    Throat culture    amoxicillin-clavulanate (AUGMENTIN) 875-125 mg per tablet      3. Acute cough  Poct Covid 19 Rapid Antigen Test    Covid/Flu-Office Collect    amoxicillin-clavulanate (AUGMENTIN) 875-125 mg per tablet      4. Exposure to COVID-19 virus  Covid/Flu-Office Collect            Patient Instructions       Follow up with PCP in 3-5 days. Proceed to  ER if symptoms worsen. Your strep A is negative. You have a throat culture pending. You are to download [employer] for the results in 3-4 days. You will be notified if the results are + and an antibiotic will be called in for you. You are to do warm salt water gargles 4 x daily. Drink warm tea with honey and lemon. Take tylenol or motrin as able for pain or fever. Chloraseptic throat spray, cough drops. Do not share utensils. Change your tooth brush in 3 days. Follow up with your PCP in 2-3 days  Go to the ED if symptoms worsen      If the covid is + you are to stop the dayquil and nyquil. You appear to have covid/symptoms. You have a covid test pending. You are to download [employer] for the results in 24-48 hours. You will be notified if results are +. You are to take vitamin C, D3,  plain robitussin for cough. Do not take cough suppressants; you want to take an expectorant. Sleep on your stomach. You are to quarantine as required per CDC guidelines of 5 days. Mask x 10 days if positive. Mask as long as you have symptoms. See your PCP for follow up in 2-3 days.     Go to the ED if symptoms worsen or are severe. You are to call your PCP if your results are + to discuss Paxlovid treatment        Chief Complaint     Chief Complaint   Patient presents with   • Headache   • Sore Throat         History of Present Illness       This is a 41year old female who works at [employer] and [employer] about 3 days ago developed chills, body aches, sorethroat, nasal congestion headache. She is covid vaccinated and tested 2 days ago and was negative. She denies n/v/d, fevers. She states there are cases of covid in the [facility] home. She denies anyone at home is ill. She denies tobacco use or lung disease.  has been taking dayquil/nyquil w/o much relief. Denies pregnancy. She states she has not had her flu shot yet for this year. Review of Systems   Review of Systems   Constitutional: Positive for chills and fatigue. HENT: Positive for congestion and sore throat. Eyes: Negative. Respiratory: Positive for cough. Cardiovascular: Negative. Gastrointestinal: Negative. Endocrine: Negative. Genitourinary: Negative. Musculoskeletal: Positive for myalgias. Skin: Negative. Allergic/Immunologic: Negative. Neurological: Positive for headaches. Hematological: Negative. Psychiatric/Behavioral: Negative.           Current Medications       Current Outpatient Medications:   •  amoxicillin-clavulanate (AUGMENTIN) 875-125 mg per tablet, Take 1 tablet by mouth every 12 (twelve) hours for 7 days, Disp: 14 tablet, Rfl: 0  •  albuterol (Ventolin HFA) 90 mcg/act inhaler, Inhale 2 puffs every 6 (six) hours as needed for wheezing, Disp: 18 g, Rfl: 2  •  buPROPion (WELLBUTRIN XL) 150 mg 24 hr tablet, take 1 tablet by mouth every morning, Disp: 30 tablet, Rfl: 5  •  famotidine (PEPCID) 40 MG tablet, Take 1 tablet (40 mg total) by mouth daily at bedtime, Disp: 30 tablet, Rfl: 5  •  hydrocortisone-pramoxine (ANALPRAM-HC) 2.5-1 % rectal cream, Apply topically 2 (two) times a day for 7 days Apply to rectum/ anus. , Disp: 4 g, Rfl: 1  •  lisinopril (ZESTRIL) 30 mg tablet, Take 1 tablet (30 mg total) by mouth daily, Disp: 30 tablet, Rfl: 2  •  omeprazole (PriLOSEC) 20 mg delayed release capsule, Take 1 capsule (20 mg total) by mouth daily, Disp: 30 capsule, Rfl: 1  •  traZODone (DESYREL) 50 mg tablet, take 1 tablet by mouth at bedtime, Disp: 30 tablet, Rfl: 0  •  triamcinolone (KENALOG) 0.5 % cream, Apply topically 2 (two) times a day, Disp: 15 g, Rfl: 0  No current facility-administered medications for this visit. Facility-Administered Medications Ordered in Other Visits:   •  hydrocortisone (ANUSOL-HC) 2.5 % rectal cream, , Topical, 4x Daily PRN, Laura Austin MD    Current Allergies     Allergies as of 2023   • (No Known Allergies)            The following portions of the patient's history were reviewed and updated as appropriate: allergies, current medications, past family history, past medical history, past social history, past surgical history and problem list.     Past Medical History:   Diagnosis Date   • Anxiety    • Asthma    • Complete spontaneous  without complication     FIVE TIMES   • Concussion    • Depression    • Headache    • Hypertension    • Insomnia    • Normal delivery     DAUGHTER   • Normal delivery     SON   • Pelvic inflammatory disease     RESOLVED: 16   • Post concussion syndrome 2019   • Recurrent pregnancy loss (CODE)    • Thyroid nodule 3/2/2019       Past Surgical History:   Procedure Laterality Date   • CHOLECYSTECTOMY     • CYSTOSCOPY N/A 2016    Procedure: CYSTOSCOPY;  Surgeon: Mignon Patel MD;  [facility];  Service:    • HYSTERECTOMY     • LAPAROSCOPIC CHOLECYSTECTOMY      RESOLVED:    • NASAL SEPTUM SURGERY      REESOLVED: ; DEVIATION   • MN LAPAROSCOPY W TOTAL HYSTERECTOMY UTERUS 250 GM/< N/A 2016    Procedure: HYSTERECTOMY LAPAROSCOPIC TOTAL ;  Surgeon:  Mignon Patel MD;  [facility];  Service: Gynecology   • SALPINGECTOMY Bilateral 12/28/2016    Procedure: SALPINGECTOMY;  Surgeon: Abigail Johnson MD;  [facility];  Service:    • TONSILLECTOMY      RESOLVED: 1997   • TUBAL LIGATION      RESOLVED: 1/22/14       Family History   Problem Relation Age of Onset   • Venous thrombosis Mother         ACUTE; OF DEEP VESSELS OF THE DISTAL LOWER EXTREMITY   • COPD Mother    • Heart disease Mother    • Hypertension Mother    • Mental illness Mother    • Colon cancer Father 61   • No Known Problems Daughter    • Kidney disease Maternal Grandmother    • No Known Problems Maternal Grandfather    • Breast cancer Paternal Grandmother 36   • Diabetes Paternal Grandfather         MELLITUS   • No Known Problems Son    • No Known Problems Maternal Aunt    • No Known Problems Maternal Aunt          Medications have been verified. Objective   /80   Pulse 84   Temp 98 °F (36.7 °C)   Resp 20   Ht 5' 3" (1.6 m)   Wt 117 kg (258 lb)   LMP 12/01/2016   SpO2 98%   BMI 45.70 kg/m²   Patient's last menstrual period was 12/01/2016. Physical Exam     Physical Exam  Vitals and nursing note reviewed. Constitutional:       General: She is not in acute distress. Appearance: She is well-developed. She is obese. She is not ill-appearing, toxic-appearing or diaphoretic. HENT:      Head: Normocephalic and atraumatic. Right Ear: Tympanic membrane and ear canal normal.      Left Ear: Tympanic membrane and ear canal normal.      Nose: Congestion present. No rhinorrhea. Mouth/Throat:      Mouth: Mucous membranes are moist. No oral lesions. Pharynx: Uvula midline. No pharyngeal swelling, oropharyngeal exudate, posterior oropharyngeal erythema or uvula swelling. Tonsils: No tonsillar exudate or tonsillar abscesses. Eyes:      Extraocular Movements:      Right eye: Normal extraocular motion. Left eye: Normal extraocular motion.    Cardiovascular:      Rate and Rhythm: Normal rate and regular rhythm. Heart sounds: Normal heart sounds. No murmur heard. Pulmonary:      Effort: Pulmonary effort is normal. No respiratory distress. Breath sounds: Normal breath sounds. No stridor. No wheezing, rhonchi or rales. Chest:      Chest wall: No tenderness. Musculoskeletal:      Cervical back: Normal range of motion and neck supple. Lymphadenopathy:      Cervical: No cervical adenopathy. Skin:     General: Skin is warm and dry. Capillary Refill: Capillary refill takes less than 2 seconds. Neurological:      General: No focal deficit present. Mental Status: She is alert and oriented to person, place, and time. Psychiatric:         Mood and Affect: Mood normal.         Behavior: Behavior normal.         covid and strep A negative. Throat culture and flu/covid cx pending.

## 2023-09-21 NOTE — LETTER
September 22, 2023     Patient: En Mckinney   YOB: 1980   Date of Visit: 9/21/2023       To Whom It May Concern: It is my medical opinion that Amandeep Young may return to work on 9/25/2023 . If you have any questions or concerns, please don't hesitate to call.          Sincerely,        KIRAA Calle    CC: No Recipients

## 2023-09-22 LAB
FLUAV RNA RESP QL NAA+PROBE: NEGATIVE
FLUBV RNA RESP QL NAA+PROBE: NEGATIVE
SARS-COV-2 RNA RESP QL NAA+PROBE: NEGATIVE

## 2023-09-22 RX ORDER — AMOXICILLIN AND CLAVULANATE POTASSIUM 875; 125 MG/1; MG/1
1 TABLET, FILM COATED ORAL EVERY 12 HOURS SCHEDULED
Qty: 14 TABLET | Refills: 0 | Status: SHIPPED | OUTPATIENT
Start: 2023-09-22 | End: 2023-09-29

## 2023-09-22 NOTE — RESULT ENCOUNTER NOTE
Covid/flu negative. Spoke with pt. Pt states she is now coughing and dizzy with getting up. Will call in Augmentin to pharmacy listed in EMR. Pt states took off work today due to her illness. Will give work note for RTW 9/25/2023.   Pt verbalizes understanding    Please have your office call patient for follow up

## 2023-09-23 LAB — BACTERIA THROAT CULT: NORMAL

## 2023-09-29 DIAGNOSIS — G43.809 OTHER MIGRAINE WITHOUT STATUS MIGRAINOSUS, NOT INTRACTABLE: Primary | ICD-10-CM

## 2023-09-29 RX ORDER — AMITRIPTYLINE HYDROCHLORIDE 25 MG/1
25 TABLET, FILM COATED ORAL
Qty: 30 TABLET | Refills: 2 | Status: SHIPPED | OUTPATIENT
Start: 2023-09-29

## 2023-10-04 DIAGNOSIS — K21.00 GASTROESOPHAGEAL REFLUX DISEASE WITH ESOPHAGITIS: ICD-10-CM

## 2023-10-04 RX ORDER — OMEPRAZOLE 20 MG/1
20 CAPSULE, DELAYED RELEASE ORAL DAILY
Qty: 30 CAPSULE | Refills: 1 | Status: SHIPPED | OUTPATIENT
Start: 2023-10-04

## 2023-10-18 DIAGNOSIS — G43.809 OTHER MIGRAINE WITHOUT STATUS MIGRAINOSUS, NOT INTRACTABLE: ICD-10-CM

## 2023-10-18 RX ORDER — AMITRIPTYLINE HYDROCHLORIDE 25 MG/1
25 TABLET, FILM COATED ORAL
Qty: 30 TABLET | Refills: 2 | Status: SHIPPED | OUTPATIENT
Start: 2023-10-18

## 2023-10-19 ENCOUNTER — HOSPITAL ENCOUNTER (OUTPATIENT)
Dept: ULTRASOUND IMAGING | Facility: CLINIC | Age: 43
Discharge: HOME | End: 2023-10-19
Payer: COMMERCIAL

## 2023-10-19 ENCOUNTER — HOSPITAL ENCOUNTER (OUTPATIENT)
Dept: MAMMOGRAPHY | Facility: CLINIC | Age: 43
Discharge: HOME | End: 2023-10-19
Payer: COMMERCIAL

## 2023-10-19 DIAGNOSIS — R92.8 ABNORMAL SCREENING MAMMOGRAM: ICD-10-CM

## 2023-10-19 PROCEDURE — 77065 DX MAMMO INCL CAD UNI: CPT

## 2023-10-19 PROCEDURE — G0279 TOMOSYNTHESIS, MAMMO: HCPCS

## 2023-10-19 PROCEDURE — 76642 ULTRASOUND BREAST LIMITED: CPT

## 2023-10-22 DIAGNOSIS — J45.20 MILD INTERMITTENT ASTHMA, UNSPECIFIED WHETHER COMPLICATED: ICD-10-CM

## 2023-10-22 DIAGNOSIS — E66.01 CLASS 3 SEVERE OBESITY DUE TO EXCESS CALORIES WITHOUT SERIOUS COMORBIDITY WITH BODY MASS INDEX (BMI) OF 40.0 TO 44.9 IN ADULT (HCC): ICD-10-CM

## 2023-10-22 RX ORDER — ALBUTEROL SULFATE 90 UG/1
AEROSOL, METERED RESPIRATORY (INHALATION)
Qty: 18 G | Refills: 2 | Status: SHIPPED | OUTPATIENT
Start: 2023-10-22

## 2023-10-23 RX ORDER — BUPROPION HYDROCHLORIDE 150 MG/1
150 TABLET ORAL EVERY MORNING
Qty: 30 TABLET | Refills: 2 | Status: SHIPPED | OUTPATIENT
Start: 2023-10-23

## 2023-10-23 NOTE — TELEPHONE ENCOUNTER
Patient requesting refill(s) of: Wellbutrin 150 mg daily    Last filled: 5/13/2023 #30 x 5  Last appt:8/9/2023  Next appt: none  Pharmacy: [facility]

## 2023-11-03 ENCOUNTER — HOSPITAL ENCOUNTER (EMERGENCY)
Facility: HOSPITAL | Age: 43
Discharge: HOME | End: 2023-11-03
Attending: EMERGENCY MEDICINE
Payer: COMMERCIAL

## 2023-11-03 ENCOUNTER — APPOINTMENT (EMERGENCY)
Dept: RADIOLOGY | Facility: HOSPITAL | Age: 43
End: 2023-11-03
Payer: COMMERCIAL

## 2023-11-03 VITALS
HEART RATE: 92 BPM | SYSTOLIC BLOOD PRESSURE: 132 MMHG | RESPIRATION RATE: 18 BRPM | OXYGEN SATURATION: 94 % | TEMPERATURE: 97.1 F | DIASTOLIC BLOOD PRESSURE: 100 MMHG

## 2023-11-03 DIAGNOSIS — M25.512 ACUTE PAIN OF LEFT SHOULDER: Primary | ICD-10-CM

## 2023-11-03 PROCEDURE — 99283 EMERGENCY DEPT VISIT LOW MDM: CPT

## 2023-11-03 PROCEDURE — 99284 EMERGENCY DEPT VISIT MOD MDM: CPT | Performed by: EMERGENCY MEDICINE

## 2023-11-03 PROCEDURE — 73030 X-RAY EXAM OF SHOULDER: CPT

## 2023-11-03 RX ORDER — ACETAMINOPHEN 325 MG/1
975 TABLET ORAL ONCE
Status: COMPLETED | OUTPATIENT
Start: 2023-11-03 | End: 2023-11-03

## 2023-11-03 RX ORDER — NAPROXEN 500 MG/1
500 TABLET ORAL ONCE
Status: COMPLETED | OUTPATIENT
Start: 2023-11-03 | End: 2023-11-03

## 2023-11-03 RX ADMIN — Medication 2 G: at 18:24

## 2023-11-03 RX ADMIN — ACETAMINOPHEN 975 MG: 325 TABLET ORAL at 18:22

## 2023-11-03 RX ADMIN — NAPROXEN 500 MG: 500 TABLET ORAL at 18:22

## 2023-11-03 NOTE — Clinical Note
Christie Talbert was seen and treated in our emergency department on 11/3/2023. Diagnosis:     Alejandra  may return to work on return date. She may return on this date: 11/10/2023         If you have any questions or concerns, please don't hesitate to call.       Piotr Mata RN    ______________________________           _______________          _______________  [facility] Representative                              Date                                Time

## 2023-11-03 NOTE — Clinical Note
Christie Armendariz was seen and treated in our emergency department on 11/3/2023. Diagnosis:     Alejandra  . She may return on this date: 11/07/2023    Please allow Alejandra limited lifting and use of her left arm until evaluated by orthopedics. If you have any questions or concerns, please don't hesitate to call.       Ora Solitario MD    ______________________________           _______________          _______________  [facility] Representative                              Date                                Time

## 2023-11-03 NOTE — Clinical Note
Chun Barrera was seen and treated in our emergency department on 11/3/2023. Diagnosis:     Alejandra  may return to work on return date. She may return on this date: 11/10/2023         If you have any questions or concerns, please don't hesitate to call.       Shay Xiong RN    ______________________________           _______________          _______________  [facility] Representative                              Date                                Time

## 2023-11-03 NOTE — ED ATTENDING ATTESTATION
11/3/2023  IRitu DO, saw and evaluated the patient. I have discussed the patient with the resident/non-physician practitioner and agree with the resident's/non-physician practitioner's findings, Plan of Care, and MDM as documented in the resident's/non-physician practitioner's note, except where noted. All available labs and Radiology studies were reviewed. I was present for key portions of any procedure(s) performed by the resident/non-physician practitioner and I was immediately available to provide assistance. At this point I agree with the current assessment done in the Emergency Department. I have conducted an independent evaluation of this patient a history and physical is as follows:    Left shoulder pain, pain with abduction of the shoulder against resistance. No neurologic symptoms. She also complains of some mild left-sided neck pain after spurling. No thoracic pain or symptoms to suggest thoracic etiology of her symptoms. Likely musculoskeletal/rotator cuff injury versus radicular pain. Will provide analgesics.     ED Course         Critical Care Time  Procedures

## 2023-11-03 NOTE — DISCHARGE INSTRUCTIONS
You were seen for left arm pain. We found no emergent causes for your symptoms. You likely injured a muscle or have a pinched nerve in your neck. You can take 975 mg acetaminophen (brand name includes Tylenol) and 400 mg ibuprofen (brand names include Advil or Motrin) every 6 hours for pain/fever. Apply the voltaren gel every 6 hrs to the area if needed. Follow up with orthopedics in a few days if you have continued pain.

## 2023-11-03 NOTE — ED PROVIDER NOTES
History  Chief Complaint   Patient presents with    Arm Pain     Patient reports left arm pain since Sunday. Patient reports she has tried icy hot, tylenol with no relief. 47-year-old woman with no relevant past medical history presents with left arm pain. Patient reports waking up Sunday morning with left arm pain rating down to the wrist.  It has been constant but did improve on Tuesday. Unfortunately this morning, she awoke with worse pain. She is having some tingling of the left fingers but no clumsiness or numbness of the left arm. She is not having neck pain. No recent falls. 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 She does not recall a specific event where she hurt herself. She is not having any other symptoms. She has tried Tylenol, IcyHot, heating pad, and ice with some improvement of her symptoms. Last medication was Tylenol last evening. She presents today due to the continued pain and to make sure everything is okay. Prior to Admission Medications   Prescriptions Last Dose Informant Patient Reported? Taking? albuterol (PROVENTIL HFA,VENTOLIN HFA) 90 mcg/act inhaler   No No   Sig: INHALE 2 PUFFS EVERY 6 HOURS AS NEEDED FOR WHEEZING   amitriptyline (ELAVIL) 25 mg tablet   No No   Sig: Take 1 tablet (25 mg total) by mouth daily at bedtime   buPROPion (WELLBUTRIN XL) 150 mg 24 hr tablet   No No   Sig: Take 1 tablet (150 mg total) by mouth every morning   famotidine (PEPCID) 40 MG tablet  Self No No   Sig: Take 1 tablet (40 mg total) by mouth daily at bedtime   hydrocortisone-pramoxine (ANALPRAM-HC) 2.5-1 % rectal cream  Self No No   Sig: Apply topically 2 (two) times a day for 7 days Apply to rectum/ anus.    lisinopril (ZESTRIL) 30 mg tablet  Self No No   Sig: Take 1 tablet (30 mg total) by mouth daily   omeprazole (PriLOSEC) 20 mg delayed release capsule   No No   Sig: TAKE 1 CAPSULE DAILY   traZODone (DESYREL) 50 mg tablet  Self No No   Sig: take 1 tablet by mouth at bedtime   triamcinolone (KENALOG) 0.5 % cream  Self No No   Sig: Apply topically 2 (two) times a day      Facility-Administered Medications: None       Past Medical History:   Diagnosis Date    Anxiety     Asthma     Complete spontaneous  without complication     FIVE TIMES    Concussion     Depression     Headache     Hypertension     Insomnia     Normal delivery     DAUGHTER    Normal delivery     SON    Pelvic inflammatory disease     RESOLVED: 18    Post concussion syndrome 2019    Recurrent pregnancy loss (CODE)     Thyroid nodule 3/2/2019       Past Surgical History:   Procedure Laterality Date    CHOLECYSTECTOMY      CYSTOSCOPY N/A 2016    Procedure: CYSTOSCOPY;  Surgeon: Sina Cornejo MD;  [facility];  Service:     HYSTERECTOMY      LAPAROSCOPIC CHOLECYSTECTOMY      RESOLVED:     NASAL SEPTUM SURGERY      REESOLVED: ; DEVIATION    MA LAPAROSCOPY W TOTAL HYSTERECTOMY UTERUS 250 GM/< N/A 2016    Procedure: HYSTERECTOMY LAPAROSCOPIC TOTAL ;  Surgeon: Sina Cornejo MD;  [facility];  Service: Gynecology    SALPINGECTOMY Bilateral 2016    Procedure: SALPINGECTOMY;  Surgeon:  Sina Cornejo MD;  [facility];  Service:     TONSILLECTOMY      RESOLVED:     TUBAL LIGATION      RESOLVED: 14       Family History   Problem Relation Age of Onset    Venous thrombosis Mother         ACUTE; OF DEEP VESSELS OF THE DISTAL LOWER EXTREMITY    COPD Mother     Heart disease Mother     Hypertension Mother     Mental illness Mother     Colon cancer Father 61    No Known Problems Daughter     Kidney disease Maternal Grandmother     No Known Problems Maternal Grandfather     Breast cancer Paternal Grandmother 36    Diabetes Paternal Grandfather         MELLITUS    No Known Problems Son     No Known Problems Maternal Aunt     No Known Problems Maternal Aunt      I have reviewed and agree with the history as documented. E-Cigarette/Vaping    E-Cigarette Use Never User      E-Cigarette/Vaping Substances    Nicotine No     THC No     CBD No     Flavoring No     Other No     Unknown No      Social History     Tobacco Use    Smoking status: Former     Types: Cigars    Smokeless tobacco: Never   Vaping Use    Vaping Use: Never used   Substance Use Topics    Alcohol use: Yes     Comment: socially    Drug use: No        Review of Systems    Physical Exam  ED Triage Vitals   Temperature Pulse Respirations Blood Pressure SpO2   11/03/23 1740 11/03/23 1740 11/03/23 1740 11/03/23 1742 11/03/23 1740   (!) 97.1 °F (36.2 °C) 92 18 132/100 94 %      Temp Source Heart Rate Source Patient Position - Orthostatic VS BP Location FiO2 (%)   11/03/23 1740 11/03/23 1844 11/03/23 1740 11/03/23 1740 --   Temporal Monitor Sitting Right arm       Pain Score       11/03/23 1740       8             Orthostatic Vital Signs  Vitals:    11/03/23 1740 11/03/23 1742 11/03/23 1844   BP:  132/100 132/100   Pulse: 92  92   Patient Position - Orthostatic VS: Sitting  Sitting       Physical Exam  Vitals and nursing note reviewed. Constitutional:       General: She is not in acute distress. Appearance: Normal appearance. She is well-developed. HENT:      Head: Normocephalic and atraumatic. Right Ear: External ear normal.      Left Ear: External ear normal.   Cardiovascular:      Rate and Rhythm: Normal rate and regular rhythm. Pulmonary:      Effort: Pulmonary effort is normal. No respiratory distress. Musculoskeletal:         General: Normal range of motion. Cervical back: Normal range of motion and neck supple. Comments: Pain with abduction of the shoulder above 90 degrees. Palpable left trapezius tenderness. 5 out of 5 strength in the bilateral upper extremities. 2+ left radial pulse. Fine finger movements of the left hand are normal.  Sensation of the left arm normal.     Skin:     General: Skin is warm and dry. Neurological:      Mental Status: She is alert and oriented to person, place, and time. Mental status is at baseline. Psychiatric:         Mood and Affect: Mood normal.         Behavior: Behavior normal.         ED Medications  Medications   naproxen (NAPROSYN) tablet 500 mg (500 mg Oral Given 11/3/23 1822)   acetaminophen (TYLENOL) tablet 975 mg (975 mg Oral Given 11/3/23 1822)   Diclofenac Sodium (VOLTAREN) 1 % topical gel 2 g (2 g Topical Given 11/3/23 1824)       Diagnostic Studies  Results Reviewed       None                   XR shoulder 2+ views LEFT   ED Interpretation by Mylene Carr MD (11/03 1823)   Radiograph personally interpreted by me as no acute osseous abnormality. Procedures  Procedures      ED Course         SBIRT 20yo+      Flowsheet Row Most Recent Value   Initial Alcohol Screen: US AUDIT-C     1. How often do you have a drink containing alcohol? 0 Filed at: 11/03/2023 1740   2. How many drinks containing alcohol do you have on a typical day you are drinking? 0 Filed at: 11/03/2023 1740   3a. Male UNDER 65: How often do you have five or more drinks on one occasion? 0 Filed at: 11/03/2023 1740   3b. FEMALE Any Age, or MALE 65+: How often do you have 4 or more drinks on one occassion? 0 Filed at: 11/03/2023 1740   Audit-C Score 0 Filed at: 11/03/2023 1740   CRICKET: How many times in the past year have you. .. Used an illegal drug or used a prescription medication for non-medical reasons? Never Filed at: 11/03/2023 1740                  Medical Decision Making  Presents for left shoulder pain after a traumatic injury. I presume she injured something while sleeping or at work in the days prior. She seems to mostly have pain of the left rotator cuff on my exam. She is denying chest pain, dyspnea, or back pain, so I doubt ACS, PE, or pneumothorax. Radiographs obtained to rule out occult fracture. Will treat pain symptomatically.     Patient reported improvement of her pain with medications. Radiographs without bony abnormality. Presumed rotator cuff injury at this time. She can follow up with orthopedics in a week if needed. Patient in agreement with plan and questions were answered. Verbalized understanding of return precautions. Portions or all of this note were generated using voice recognition software. Occasional wrong word or "sound a like" substitutions may have occurred due to the inherent limitations of voice recognition software. Please interpret any errors within the intended context of the whole sentence or idea. Amount and/or Complexity of Data Reviewed  Radiology: ordered and independent interpretation performed. Risk  OTC drugs. Prescription drug management. Disposition  Final diagnoses:   Acute pain of left shoulder     Time reflects when diagnosis was documented in both MDM as applicable and the Disposition within this note       Time User Action Codes Description Comment    11/3/2023  6:07 PM Pauluriel Dano Add [O13.454] Acute pain of left shoulder           ED Disposition       ED Disposition   Discharge    Condition   Stable    Date/Time   Fri Nov 3, 2023 1806    Comment   Alejandra Cope discharge to home/self care. Follow-up Information       Follow up With Specialties Details Why Contact Info Additional Information    [address] AFFILIATED WITH [facility] Orthopedic Surgery Schedule an appointment as soon as possible for a visit in 3 days As needed [address] [phone]  [address] [place] [address] AFFILIATED WITH [facility], [address] [place] [address], [address] [place] [address]            Patient's Medications   Discharge Prescriptions    No medications on file     No discharge procedures on file. PDMP Review       None             ED Provider  Attending physically available and evaluated Alejandra Cope.  I managed the patient along with the ED Attending.     Electronically Signed by           Stanford Akbar MD  11/03/23 [id number]

## 2023-11-03 NOTE — Clinical Note
Ivonne Vuong was seen and treated in our emergency department on 11/3/2023. Diagnosis:     Alejandra  . She may return on this date: 11/07/2023    Please allow Alejandra limited lifting and use of her left arm until evaluated by orthopedics. If you have any questions or concerns, please don't hesitate to call.       Bud Arroyo MD    ______________________________           _______________          _______________  [facility] Representative                              Date                                Time

## 2023-11-09 ENCOUNTER — APPOINTMENT (OUTPATIENT)
Dept: RADIOLOGY | Facility: CLINIC | Age: 43
End: 2023-11-09
Payer: COMMERCIAL

## 2023-11-09 ENCOUNTER — OFFICE VISIT (OUTPATIENT)
Dept: OBGYN CLINIC | Facility: CLINIC | Age: 43
End: 2023-11-09
Payer: COMMERCIAL

## 2023-11-09 VITALS
BODY MASS INDEX: 45.89 KG/M2 | WEIGHT: 259 LBS | DIASTOLIC BLOOD PRESSURE: 82 MMHG | HEART RATE: 71 BPM | HEIGHT: 63 IN | TEMPERATURE: 96.8 F | SYSTOLIC BLOOD PRESSURE: 152 MMHG

## 2023-11-09 DIAGNOSIS — M54.12 RADICULOPATHY, CERVICAL REGION: Primary | ICD-10-CM

## 2023-11-09 DIAGNOSIS — M25.512 LEFT SHOULDER PAIN, UNSPECIFIED CHRONICITY: ICD-10-CM

## 2023-11-09 PROCEDURE — 73030 X-RAY EXAM OF SHOULDER: CPT

## 2023-11-09 PROCEDURE — 99214 OFFICE O/P EST MOD 30 MIN: CPT | Performed by: STUDENT IN AN ORGANIZED HEALTH CARE EDUCATION/TRAINING PROGRAM

## 2023-11-09 NOTE — LETTER
November 9, 2023     Patient: Kd Guan  YOB: 1980  Date of Visit: 11/9/2023      To Whom it May Concern:    Venkatesh Klein is under my professional care. Alejandra was seen in my office on 11/9/2023. Alejandra may return to work with limitations no heavy lifting of anything greater than 5 pounds with the left arm until evaluated the the Spine Care team .    If you have any questions or concerns, please don't hesitate to call. Sincerely,          Chriss Main MD        CC: Alejandra López

## 2023-11-09 NOTE — PROGRESS NOTES
Ortho Sports Medicine Shoulder New Patient Visit     Assesment:   41 y.o. female with left upper extremity pain, paraesthesias and weakness ongoing for 2 weeks without any injury. Exam consistent with cervical radiculopathy. Plan:  I reviewed the history, exam, and imaging with the patient in clinic today. I discussed with the patient that her shoulder x-rays as well as her shoulder exam does not show any osteoarthritis, rotator cuff pathology, shoulder instability or evidence of adhesive capsulitis. Based on the fact that she has radiating pain from her neck down to her hand with recent numbness and tingling in the fingers and weakness with finger abduction and a positive Spurling's Duvor, I discussed with the patient that her symptoms are most likely coming from neck pathology consistent with cervical radiculopathy. I recommended a referral to our pain and spine care team.  I also provided the patient with a work note saying that she should be restricted to light duty until she is evaluated for her cervical pathology. The patient demonstrated understanding of the discussion was agreed with plan. All of her questions were answered. She can follow-up on an as-needed basis. Conservative treatment:  Let pain guide return to activities. Work note provided  Referral provided for the Spine Care team.    Imaging: All imaging from today was reviewed by myself and explained to the patient. Injection:  No Injection planned at this time. Surgery:  No surgery is recommended at this point, continue with conservative treatment plan as noted. Follow up:  Return if symptoms worsen or fail to improve. Chief Complaint   Patient presents with    Left Shoulder - Pain         History of Present Illness: The patient is a 41 y.o. RHD female seen in clinic for left shoulder pain which begins in the neck and radiates down the arm. The pain started 2 weeks ago.  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 The patient characterizes the intensity of pain as a 8 out of 10 at worst. The patient states that the pain is interfering with her sleep. The patient has tried ice, heat, tylenol, and analgesic cream yet nothing improves her symtpoms. Symptoms have not improved with time. About 1 week ago she noted numbness and tingling in the left hand and fingers. She describes the pain as stabbing. She has been trying to use the arm but having difficulty. She denies any clicking, popping or stiffness of the shoulder. Occupation: nursing assistant    The patient has the following co-morbidities: HTN/asthma    Shoulder Surgical History:  None    Past Medical, Social and Family History:  Past Medical History:   Diagnosis Date    Anxiety     Asthma     Complete spontaneous  without complication     FIVE TIMES    Concussion     Depression     Headache     Hypertension     Insomnia     Normal delivery     DAUGHTER    Normal delivery     SON    Pelvic inflammatory disease     RESOLVED: 16    Post concussion syndrome 2019    Recurrent pregnancy loss (CODE)     Thyroid nodule 3/2/2019     Past Surgical History:   Procedure Laterality Date    CHOLECYSTECTOMY      CYSTOSCOPY N/A 2016    Procedure: CYSTOSCOPY;  Surgeon: Abigail Johnson MD;  Location: [facility];  Service:     HYSTERECTOMY      LAPAROSCOPIC CHOLECYSTECTOMY      RESOLVED:     NASAL SEPTUM SURGERY      REESOLVED: ; DEVIATION    [place] LAPAROSCOPY W TOTAL HYSTERECTOMY UTERUS 250 GM/< N/A 2016    Procedure: HYSTERECTOMY LAPAROSCOPIC TOTAL ;  Surgeon: Abigail Johnson MD;  Location: [facility];  Service: Gynecology    SALPINGECTOMY Bilateral 2016    Procedure: SALPINGECTOMY;  Surgeon:  Abigail Johnson MD;  Location: [facility];  Service:     TONSILLECTOMY      RESOLVED:     TUBAL LIGATION      RESOLVED: 14     No Known Allergies  Current Outpatient Medications on File Prior to Visit Medication Sig Dispense Refill    albuterol (PROVENTIL HFA,VENTOLIN HFA) 90 mcg/act inhaler INHALE 2 PUFFS EVERY 6 HOURS AS NEEDED FOR WHEEZING 18 g 2    amitriptyline (ELAVIL) 25 mg tablet Take 1 tablet (25 mg total) by mouth daily at bedtime 30 tablet 2    buPROPion (WELLBUTRIN XL) 150 mg 24 hr tablet Take 1 tablet (150 mg total) by mouth every morning 30 tablet 2    famotidine (PEPCID) 40 MG tablet Take 1 tablet (40 mg total) by mouth daily at bedtime 30 tablet 5    lisinopril (ZESTRIL) 30 mg tablet Take 1 tablet (30 mg total) by mouth daily 30 tablet 2    omeprazole (PriLOSEC) 20 mg delayed release capsule TAKE 1 CAPSULE DAILY 30 capsule 1    traZODone (DESYREL) 50 mg tablet take 1 tablet by mouth at bedtime 30 tablet 0    triamcinolone (KENALOG) 0.5 % cream Apply topically 2 (two) times a day 15 g 0    hydrocortisone-pramoxine (ANALPRAM-HC) 2.5-1 % rectal cream Apply topically 2 (two) times a day for 7 days Apply to rectum/ anus.  4 g 1     Current Facility-Administered Medications on File Prior to Visit   Medication Dose Route Frequency Provider Last Rate Last Admin    hydrocortisone (ANUSOL-HC) 2.5 % rectal cream   Topical 4x Daily PRN Joie Tian MD         Social History     Socioeconomic History    Marital status: /Civil Union     Spouse name: Not on file    Number of children: Not on file    Years of education: Not on file    Highest education level: Not on file   Occupational History    Occupation: employed   Tobacco Use    Smoking status: Former     Types: Cigars    Smokeless tobacco: Never   Vaping Use    Vaping Use: Never used   Substance and Sexual Activity    Alcohol use: Yes     Comment: socially    Drug use: No    Sexual activity: Yes     Partners: Male     Birth control/protection: None   Other Topics Concern    Not on file   Social History Narrative    CAFF[employer] USE        Employed Full Time- [employer] -      Social Determinants of Health     Financial Resource Strain: Not on file   Food Insecurity: Not on file   Transportation Needs: Not on file   Physical Activity: Not on file   Stress: Not on file   Social Connections: Not on file   Intimate Partner Violence: Not on file   Housing Stability: Not on file       I have reviewed the past medical, surgical, social and family history, medications and allergies as documented in the EMR. Review of systems: ROS is negative other than that noted in the HPI. Constitutional: Negative for fatigue and fever. Physical Exam:    Blood pressure 152/82, pulse 71, temperature (!) 96.8 °F (36 °C), temperature source Tympanic, height 5' 3" (1.6 m), weight 117 kg (259 lb), last menstrual period 12/01/2016, not currently breastfeeding. General/Constitutional: NAD, well developed, well nourished  HENT: Normocephalic, atraumatic  CV: Intact distal pulses, regular rate  Resp: No respiratory distress or labored breathing  GI: Soft and non-tender   Lymphatic: No lymphadenopathy palpated  Neuro: Alert and Oriented x 3, no focal deficits  Psych: Normal mood, normal affect, normal judgement, normal behavior  Skin: Warm, dry, no rashes, no erythema      Focused Left shoulder exam:  Left-sided paracervical and superior trapezius muscle tenderness. No cervical/midline tenderness. No pain with neck flexion, extension, side-to-side bending, or rotation. Positive Spurling's left. Negative Garrett's sign. The skin is intact without evidence of erythema or ecchymosis. Symmetric shoulders with no evidence of supraspinatus or infraspinatus muscle atrophy. There is no evidence neurologic medial or lateral scapular winging. Palpation demonstrates no tenderness over the SC joint, bilateral clavicle, lateral aspect of the acromion or posterior joint line, AC joint, or bicipital groove. Shoulder ROM demonstrates 170 degrees of active forward elevation. External rotation with the arms at the side demonstrates 80 degree of active motion. Internal rotation is to L3 vs T12 on the right. Strength testing demonstrates 5/5 strength in empty can position, 5/5 with resisted external rotation with the arm at the side. 5/5 strength of the subscapularis and a negative belly press. Left hand abduction and  strength. Provocative testing for the following demonstrate-  Impingement- negative Hawkin's impingement test, negative Neer impingement sign. Biceps- negative [place], negative Speeds test.  Labrum- negative [place] test, negative sulcus sign  Negative Tinel's at the cubital and carpal tunnel    Noted weakness with finger abduction and  strength relative to the contralateral side. UE NV Exam: +2 Radial pulses bilaterally. Fingers are warm and well-perfused. Sensation intact to light touch C5-T1 bilaterally, Radial/median/ulnar nerve motor intact      Shoulder Imaging    Radiographs of the left shoulder were obtained on 11/9/23 and reviewed with the patient. Based on my independent evaluation, the imaging shows no acute osseous abnormality.       Scribe Attestation      I,:  Alexandre Barron PA-C am acting as a scribe while in the presence of the attending physician.:       I,:  Uma Leung MD personally performed the services described in this documentation    as scribed in my presence.:

## 2023-11-14 ENCOUNTER — CONSULT (OUTPATIENT)
Dept: PAIN MEDICINE | Facility: CLINIC | Age: 43
End: 2023-11-14
Payer: COMMERCIAL

## 2023-11-14 ENCOUNTER — APPOINTMENT (OUTPATIENT)
Dept: RADIOLOGY | Facility: CLINIC | Age: 43
End: 2023-11-14
Payer: COMMERCIAL

## 2023-11-14 VITALS
DIASTOLIC BLOOD PRESSURE: 96 MMHG | WEIGHT: 258.6 LBS | HEIGHT: 63 IN | BODY MASS INDEX: 45.82 KG/M2 | SYSTOLIC BLOOD PRESSURE: 134 MMHG | HEART RATE: 69 BPM

## 2023-11-14 DIAGNOSIS — M54.2 NECK PAIN: Primary | ICD-10-CM

## 2023-11-14 DIAGNOSIS — M54.12 RADICULOPATHY, CERVICAL REGION: ICD-10-CM

## 2023-11-14 PROCEDURE — 72052 X-RAY EXAM NECK SPINE 6/>VWS: CPT

## 2023-11-14 PROCEDURE — 99244 OFF/OP CNSLTJ NEW/EST MOD 40: CPT | Performed by: STUDENT IN AN ORGANIZED HEALTH CARE EDUCATION/TRAINING PROGRAM

## 2023-11-14 RX ORDER — METHOCARBAMOL 500 MG/1
TABLET, FILM COATED ORAL
Qty: 90 TABLET | Refills: 0 | Status: SHIPPED | OUTPATIENT
Start: 2023-11-14

## 2023-11-14 RX ORDER — METHYLPREDNISOLONE 4 MG/1
TABLET ORAL
Qty: 1 EACH | Refills: 0 | Status: SHIPPED | OUTPATIENT
Start: 2023-11-14

## 2023-11-14 NOTE — LETTER
November 14, 2023     Patient: Can Phipps  YOB: 1980  Date of Visit: 11/14/2023      To Whom it May Concern:    Ha Wynn is under my professional care. Alejandra was seen in my office on 11/14/2023. Alejandra may return to work with limitations - no heavy lifting greater than 5 lbs with left arm until follow up in 4 weeks . If you have any questions or concerns, please don't hesitate to call.          Sincerely,        Kiki Villarreal MD

## 2023-11-14 NOTE — PROGRESS NOTES
Assessment:  1. Neck pain    2. Radiculopathy, cervical region        Portions of the record may have been created with voice recognition software. Occasional wrong word or "sound a like" substitutions may have occurred due to the inherent limitations of voice recognition software. Read the chart carefully and recognize, using context, where substitutions have occurred. Contact me with any questions. Plan:    65-year-old female referred by Dr. Prasad Vallejo, here for initial evaluation of left-sided neck pain with radiation into LUE of 2 weeks duration without significant inciting event. She also notes intermittent paresthesias in all fingers of left hand. Notes difficulty with LUE function due to symptoms but denies progressive weakness, balance/gait issues. No relevant imaging available for review. 1.  Obtain cervical spine x-rays for further evaluation. 2.  Start Medrol dose pack for symptom management. Advised to avoid NSAIDs while on this. 3.  Start Robaxin 500 mg 3 times daily as needed. Risks and benefit discussed. 4.  Start physical therapy for neck strengthening exercises, optimizing ROM. 5.  Follow-up in 4 weeks or as needed. 6.  If symptoms persist or worsen, consider cervical spine MRI for further evaluation. History of Present Illness: The patient is a 41 y.o. female who presents for consultation in regards to Shoulder Pain, Arm Pain, and Neck Pain. Symptoms have been present for 2 weeks. Symptoms began without any precipitating injury or trauma. Pain is reported to be 8 on the numeric rating scale. Symptoms are felt nearly constantly and worst in the no typical pattern. Symptoms are characterized as shooting, sharp, numbing, pressure-like, and throbbing. Symptoms are associated with left arm weakness. Aggravating factors include lying down, sitting, exercise, and relaxation. Relieving factors include nothing.   No change in symptoms with standing, bending, walking, coughing/sneezing, and bowel movements. Patient presented to the ER on 11/3 for these symptoms where left shoulder x-ray was obtained without significant findings. At home, she has tried OTC management including Tylenol, IcyHot, warm/cold compress without significant relief. She was also evaluated by Dr. Estrella Bagley for these symptoms and were deemed less likely to be coming from shoulder pathology. Review of Systems:    Review of Systems   Constitutional:  Negative for fever and unexpected weight change. HENT:  Negative for trouble swallowing. Eyes:  Negative for visual disturbance. Respiratory:  Negative for shortness of breath and wheezing. Cardiovascular:  Negative for chest pain and palpitations. Gastrointestinal:  Negative for constipation, diarrhea and nausea. Endocrine: Negative for cold intolerance, heat intolerance and polydipsia. Genitourinary:  Negative for difficulty urinating and frequency. Musculoskeletal:  Negative for arthralgias, gait problem, joint swelling and myalgias. Muscle Pain  Joint Pain   Skin:  Negative for rash. Neurological:  Negative for dizziness, seizures, weakness and headaches. Hematological:  Does not bruise/bleed easily. Psychiatric/Behavioral:  Negative for dysphoric mood. The patient is nervous/anxious. Past Medical History:   Diagnosis Date    Anxiety     Asthma     Complete spontaneous  without complication     FIVE TIMES    Concussion     Depression     Headache     Hypertension     Insomnia     Normal delivery     DAUGHTER    Normal delivery     SON    Pelvic inflammatory disease     RESOLVED: 16    Post concussion syndrome 2019    Recurrent pregnancy loss (CODE)     Thyroid nodule 3/2/2019       Past Surgical History:   Procedure Laterality Date    CHOLECYSTECTOMY      CYSTOSCOPY N/A 2016    Procedure: Pinky Pedro;  Surgeon:  Bakari Toro MD;  Location: [facility];  Service:     HYSTERECTOMY LAPAROSCOPIC CHOLECYSTECTOMY      RESOLVED: 2006    NASAL SEPTUM SURGERY      REESOLVED: 1999; DEVIATION    [place] LAPAROSCOPY W TOTAL HYSTERECTOMY UTERUS 250 GM/< N/A 12/28/2016    Procedure: HYSTERECTOMY LAPAROSCOPIC TOTAL ;  Surgeon: Rhonda Alvarado MD;  Location: [facility];  Service: Gynecology    SALPINGECTOMY Bilateral 12/28/2016    Procedure: SALPINGECTOMY;  Surgeon:  Rhonda Alvarado MD;  Location: [facility];  Service:     TONSILLECTOMY      RESOLVED: 1997    TUBAL LIGATION      RESOLVED: 1/22/14       Family History   Problem Relation Age of Onset    Venous thrombosis Mother         ACUTE; OF DEEP VESSELS OF THE DISTAL LOWER EXTREMITY    COPD Mother     Heart disease Mother     Hypertension Mother     Mental illness Mother     Colon cancer Father 61    No Known Problems Daughter     Kidney disease Maternal Grandmother     No Known Problems Maternal Grandfather     Breast cancer Paternal Grandmother 36    Diabetes Paternal Grandfather         MELLITUS    No Known Problems Son     No Known Problems Maternal Aunt     No Known Problems Maternal Aunt        Social History     Occupational History    Occupation: employed   Tobacco Use    Smoking status: Former     Types: Cigars    Smokeless tobacco: Never   Vaping Use    Vaping Use: Never used   Substance and Sexual Activity    Alcohol use: Yes     Comment: socially    Drug use: No    Sexual activity: Yes     Partners: Male     Birth control/protection: None         Current Outpatient Medications:     albuterol (PROVENTIL HFA,VENTOLIN HFA) 90 mcg/act inhaler, INHALE 2 PUFFS EVERY 6 HOURS AS NEEDED FOR WHEEZING, Disp: 18 g, Rfl: 2    amitriptyline (ELAVIL) 25 mg tablet, Take 1 tablet (25 mg total) by mouth daily at bedtime, Disp: 30 tablet, Rfl: 2    buPROPion (WELLBUTRIN XL) 150 mg 24 hr tablet, Take 1 tablet (150 mg total) by mouth every morning, Disp: 30 tablet, Rfl: 2    famotidine (PEPCID) 40 MG tablet, Take 1 tablet (40 mg total) by mouth daily at bedtime, Disp: 30 tablet, Rfl: 5    lisinopril (ZESTRIL) 30 mg tablet, Take 1 tablet (30 mg total) by mouth daily, Disp: 30 tablet, Rfl: 2    methocarbamol (ROBAXIN) 500 mg tablet, Take 1 tablet up to three times a day as needed, Disp: 90 tablet, Rfl: 0    methylPREDNISolone 4 MG tablet therapy pack, Use as directed on package, Disp: 1 each, Rfl: 0    omeprazole (PriLOSEC) 20 mg delayed release capsule, TAKE 1 CAPSULE DAILY, Disp: 30 capsule, Rfl: 1    hydrocortisone-pramoxine (ANALPRAM-HC) 2.5-1 % rectal cream, Apply topically 2 (two) times a day for 7 days Apply to rectum/ anus. (Patient not taking: Reported on 11/14/2023), Disp: 4 g, Rfl: 1    traZODone (DESYREL) 50 mg tablet, take 1 tablet by mouth at bedtime (Patient not taking: Reported on 11/14/2023), Disp: 30 tablet, Rfl: 0    triamcinolone (KENALOG) 0.5 % cream, Apply topically 2 (two) times a day (Patient not taking: Reported on 11/14/2023), Disp: 15 g, Rfl: 0  No current facility-administered medications for this visit. Facility-Administered Medications Ordered in Other Visits:     hydrocortisone (ANUSOL-HC) 2.5 % rectal cream, , Topical, 4x Daily PRN, Nathan Wise MD    No Known Allergies    Physical Exam:    /96   Pulse 69   Ht 5' 3" (1.6 m)   Wt 117 kg (258 lb 9.6 oz)   LMP 12/01/2016   BMI 45.81 kg/m²     Constitutional: normal, well developed, well nourished, alert, in no distress and non-toxic and no overt pain behavior.   Eyes: anicteric  HEENT: grossly intact  Neck: supple, symmetric, trachea midline and no masses   Pulmonary:even and unlabored  Cardiovascular:No edema or pitting edema present  Skin:Normal without rashes or lesions and well hydrated  Psychiatric:Mood and affect appropriate  Neurologic:Cranial Nerves II-XII grossly intact  Musculoskeletal:normal gait, limited neck ROM with extension/rotation, positive Spurling's on L, negative arriaza's, grossly intact strength and sensation over BUE except L finger intrinsics 4/5    Imaging  XR spine cervical complete 6+ vw flex/ext/obl    (Results Pending)       Orders Placed This Encounter   Procedures    XR spine cervical complete 6+ vw flex/ext/obl    Ambulatory referral to Physical Therapy

## 2023-11-20 ENCOUNTER — EVALUATION (OUTPATIENT)
Dept: PHYSICAL THERAPY | Facility: CLINIC | Age: 43
End: 2023-11-20
Payer: COMMERCIAL

## 2023-11-20 DIAGNOSIS — M54.12 RADICULOPATHY, CERVICAL REGION: ICD-10-CM

## 2023-11-20 PROCEDURE — 97140 MANUAL THERAPY 1/> REGIONS: CPT

## 2023-11-20 PROCEDURE — 97162 PT EVAL MOD COMPLEX 30 MIN: CPT

## 2023-11-20 PROCEDURE — 97110 THERAPEUTIC EXERCISES: CPT

## 2023-11-20 NOTE — PROGRESS NOTES
PT Evaluation     Today's date: 2023  Patient name: Kady Esquivel  : 1980  MRN: [id number]  Referring provider: Maia Eastman MD  Dx:   Encounter Diagnosis     ICD-10-CM    1. Radiculopathy, cervical region  M54.12 Ambulatory referral to Physical Therapy                     Assessment  Assessment details: Alejandra  is a 41 y.o. female presenting to outpatient physical therapy with noted impairments including pain, impaired soft tissue mobility, reduced range of motion, reduced strength, reduced postural awareness, and reduced activity tolerance. Signs and symptoms consistent with cervical radiculopathy. Due to noted impairments, the patient's present functional limitations include difficulty with ADLs with increased need for assistance, reliance on medication and/or modalities for pain relief, reduced tolerance for functional mobility and activity, and difficulty completing work responsibilities. Patient to benefit from skilled outpatient physical therapy 2x/week for 6-8  weeks in order to reduce pain, maximize pain free range of motion, increase strength and stability, and improve functional mobility/functional activity in order to maximize return to prior level of function with reduced limitations. Home exercise program was provided and all questions answered to patient's level of satisfaction. Thank you for your referral.  Impairments: abnormal muscle firing, abnormal or restricted ROM, activity intolerance, lacks appropriate home exercise program, pain with function, poor posture  and poor body mechanics  Understanding of Dx/Px/POC: good   Prognosis: good    Goals  STG to be achieved within 3-4 weeks: [place] with home exercise programming for improved carryover. Reduction in pain by 50%. Increase in neck ROM by 5-10 degrees in all directions    LTG to be achieved by discharge:  FOTO to reflect improved function as evidenced by achieving at or above projected values.   Reduction in pain by 50-75%. Neck ROM WFL in all planes. Ability to return to work pain free. Improved sleep quality with ability to return to sleeping on L side or stomach pain free. Plan  Patient would benefit from: skilled physical therapy  Planned modality interventions: cryotherapy and thermotherapy: hydrocollator packs  Planned therapy interventions: strengthening, stretching, therapeutic activities, therapeutic exercise, patient education, postural training, nerve gliding, massage, manual therapy, flexibility, home exercise program and body mechanics training  Frequency: 2x week  Duration in weeks: 6  Plan of Care beginning date: 2023  Plan of Care expiration date: 2024  Treatment plan discussed with: patient        Subjective Evaluation    History of Present Illness  Mechanism of injury: Alejandra is a 41year old female presenting with L arm pain x3 weeks. She notes intermittent  hnad numbness/tingling. She presented to ED 11/3/23 with L arm pain radiating down to wrist.  Follow up with Ortho  with dx of cervical radiculopathy. She saw pain management  and was prescribed Medrol dose pack and Robaxin with referral placed for physical therapy evaluation. She notes pain shooting down L arm. 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 Patient Goals  Patient goals for therapy: decreased pain and return to work    Pain  Current pain ratin  At best pain ratin  At worst pain rating: 10  Location: middle of neck through L shoulder down to fingers, also down middle of back  Quality: radiating and sharp (stiff neck)  Relieving factors: heat  Exacerbated by: sleeping (on L side or stomach), pushing. Progression: worsening    Social Support    Employment status: working (nursing assistant)  Hand dominance: right      Diagnostic Tests  X-ray: abnormal (Progressive mild degenerative disc disease C5-6.  Reversal of normal cervical lordosis again evident. Grade 1 retrolisthesis C4-5 with minimal dynamic instability)  Treatments  Previous treatment: medication  Current treatment: medication and physical therapy        Objective     Concurrent Complaints  Positive for night pain, disturbed sleep and dizziness (with bending over). Negative for headaches    Postural Observations  Seated posture: fair  Standing posture: fair      Palpation   Left   Hypertonic in the suboccipitals and upper trapezius. Tenderness of the suboccipitals and upper trapezius. Tenderness   Cervical Spine   Tenderness in the left ribs. Neurological Testing     Sensation   Cervical/Thoracic   Left   Intact: light touch    Right   Intact: light touch    Active Range of Motion   Cervical/Thoracic Spine       Cervical    Flexion: 30 degrees  with pain  Extension: 30 degrees     with pain  Left lateral flexion: 35 degrees     with pain  Right lateral flexion: 40 degrees     with pain  Left rotation: 30 degrees with pain  Right rotation: 40 degrees    with pain    Tests   Cervical   Positive repeated extension, repeated flexion, vertical compression and cervical distraction test.    Left   Positive Spurling's Test A. Left Shoulder   Positive cervical rotation lateral flexion. Lumbar   Positive vertical compression .      General Comments:    Upper quarter screen   Shoulder: unremarkable  Elbow: unremarkable  Hand/wrist: unremarkable  Neuro Exam:     Headaches   Patient reports headaches: No.              Precautions: neck pain, intermittent N/T  IE 11/20/23  RE 1/1/24    Date 11/20        Visit Count 1        FOTO 11/20        Pain In 9/10        Pain Out 9/10            Manuals 11/20        Neck STM, cervical distraction 10' MW                                   Neuro Re-Ed                                                                        Ther Ex         UBE NV        Chin tuck Supine x10 Seated if marialuisa       Scapular retraction Supine x10/3"  Seated x10/3"        Chin nod Supine x10        AROM cervical LF/ROT Supine 3x30" ea Seated if marialuisa       BUE ER         Trunk and neck extension over bolster/chair with BUE flex         Wall push up         MTP/LTP         Wall angels                  Ther Activity         Postural focus with lifting from floor and table height                                             Modalities         Cryo/MHP PRN MHP NV pre or post - pt preference                 Access Code: [email or web address]  URL: [email or web address]  Date: 11/20/2023  Prepared by: Mariam Velazquez    Exercises  - Supine Chin Tuck  - 1 x daily - 7 x weekly - 3 sets - 10 reps  - Supine Cervical Sidebending  - 1 x daily - 7 x weekly - 1 sets - 3 reps - 30 hold  - Beginner Head Nod  - 1 x daily - 7 x weekly - 3 sets - 10 reps  - Supine Cervical Rotation AROM on Pillow  - 1 x daily - 7 x weekly - 1 sets - 3 reps - 30 hold  - Supine Scapular Retraction  - 1 x daily - 7 x weekly - 3 sets - 10 reps  - Seated Scapular Retraction  - 1 x daily - 7 x weekly - 3 sets - 10 reps

## 2023-11-21 ENCOUNTER — TELEPHONE (OUTPATIENT)
Dept: PAIN MEDICINE | Facility: CLINIC | Age: 43
End: 2023-11-21

## 2023-11-21 NOTE — TELEPHONE ENCOUNTER
----- Message from Sandra Stoddard MD sent at 11/21/2023  8:53 AM EST -----  Xray of cervical spine shows progressive mild DDD at C5-6, grade 1 retrolisthesis at C4-5 with minimal dynamic instability.

## 2023-11-21 NOTE — TELEPHONE ENCOUNTER
S/W pt. Advised pt of the same. Pt verbalized understanding. Pts NOV 12/12 with RD to discuss next steps. Pt states she is still completing therapy as previously ordered.

## 2023-11-27 ENCOUNTER — OFFICE VISIT (OUTPATIENT)
Dept: PHYSICAL THERAPY | Facility: CLINIC | Age: 43
End: 2023-11-27
Payer: COMMERCIAL

## 2023-11-27 DIAGNOSIS — M54.12 RADICULOPATHY, CERVICAL REGION: Primary | ICD-10-CM

## 2023-11-27 PROCEDURE — 97140 MANUAL THERAPY 1/> REGIONS: CPT

## 2023-11-27 PROCEDURE — 97110 THERAPEUTIC EXERCISES: CPT

## 2023-11-27 NOTE — PROGRESS NOTES
Daily Note     Today's date: 2023  Patient name: Ethel Narayanan  : 1980  MRN: [id number]  Referring provider: Sophia Pereira MD  Dx:   Encounter Diagnosis     ICD-10-CM    1. Radiculopathy, cervical region  M54.12                      Subjective: I'm in a lot of pain today. She notes exercises made arm and neck feel worse. It takes from getting off of work Monday morning until about Wednesday to not be in constant pain. Objective: See treatment diary below      Assessment: Tolerated treatment fair with continued pain after weekend of work. Session focused on improving soft tissue mobility, addressing postural deficits, increasing postural awareness, and initiating strength training. Pain grossly unchanged throughout session. Patient demonstrated fatigue post treatment and would benefit from continued PT      Plan: Continue per plan of care. Progress treatment as tolerated.        Precautions: neck pain, intermittent N/T  IE 23  RE 24    Date        Visit Count 1 2       FOTO         Pain In 9/10 9/10       Pain Out 9/10 9/10           Manuals        Neck STM, cervical distraction 10' MY 10' MY                                  Neuro Re-Ed                                                                        Ther Ex         UBE NV 5 min alt every min       Chin tuck Supine x10 Supine x15 Seated if marialuisa      Scapular retraction Supine x10/3"  Seated x10/3" Seated 15x/3"       Chin nod Supine x10 Supine 15x       DNF  NV       AROM cervical LF/ROT Supine 3x30" ea Seated 5x/5" pause ea dir       BUE ER  10x  10x Yellow       Trunk and neck extension over bolster/chair with BUE flex  Seated in chair 10x/5" hold       Wall push up         MTP/LTP  RED  2x10 ea       Wall angels                  Ther Activity         Postural focus with lifting from floor and table height                                             Modalities         Cryo/MHP PRN MHP NV pre or post - pt preference MHP 10'   Cervical spine and UT  Post session  No adverse reaction                Access Code: [email or web address]  URL: [email or web address]  Date: 11/20/2023  Prepared by: Kerwin Walker    Exercises  - Supine Chin Tuck  - 1 x daily - 7 x weekly - 3 sets - 10 reps  - Supine Cervical Sidebending  - 1 x daily - 7 x weekly - 1 sets - 3 reps - 30 hold  - Beginner Head Nod  - 1 x daily - 7 x weekly - 3 sets - 10 reps  - Supine Cervical Rotation AROM on Pillow  - 1 x daily - 7 x weekly - 1 sets - 3 reps - 30 hold  - Supine Scapular Retraction  - 1 x daily - 7 x weekly - 3 sets - 10 reps  - Seated Scapular Retraction  - 1 x daily - 7 x weekly - 3 sets - 10 reps

## 2023-11-29 ENCOUNTER — PATIENT MESSAGE (OUTPATIENT)
Dept: PAIN MEDICINE | Facility: CLINIC | Age: 43
End: 2023-11-29

## 2023-11-29 DIAGNOSIS — M79.2 NEUROPATHIC PAIN: Primary | ICD-10-CM

## 2023-11-29 RX ORDER — GABAPENTIN 300 MG/1
300 CAPSULE ORAL
Qty: 30 CAPSULE | Refills: 0 | Status: SHIPPED | OUTPATIENT
Start: 2023-11-29

## 2023-11-29 NOTE — LETTER
January 2, 2024     Patient: Alejandra Cope   YOB: 1980   Date of Visit: 12/14/2023       To Whom It May Concern:       Alejandra Cope may return to work without restrictions.    If you have any questions or concerns, please don't hesitate to call.         Sincerely,    Ceci Lee MD

## 2023-11-30 DIAGNOSIS — I10 ESSENTIAL HYPERTENSION: ICD-10-CM

## 2023-11-30 RX ORDER — LISINOPRIL 30 MG/1
30 TABLET ORAL DAILY
Qty: 30 TABLET | Refills: 2 | Status: SHIPPED | OUTPATIENT
Start: 2023-11-30

## 2023-12-04 ENCOUNTER — OFFICE VISIT (OUTPATIENT)
Dept: PHYSICAL THERAPY | Facility: CLINIC | Age: 43
End: 2023-12-04
Payer: COMMERCIAL

## 2023-12-04 DIAGNOSIS — M54.12 RADICULOPATHY, CERVICAL REGION: Primary | ICD-10-CM

## 2023-12-04 PROCEDURE — 97140 MANUAL THERAPY 1/> REGIONS: CPT

## 2023-12-04 PROCEDURE — 97535 SELF CARE MNGMENT TRAINING: CPT

## 2023-12-04 NOTE — PROGRESS NOTES
Daily Note     Today's date: 2023  Patient name: Sebastián Balbuena  : 1980  MRN: [id number]  Referring provider: Oscar Lock, PT  Dx:   Encounter Diagnosis     ICD-10-CM    1. Radiculopathy, cervical region  M54.12                      Subjective:   Pt. Reports she has "9"/10 pain level @ B cerv region at this present time, which radiates down into her mid back area. Describes sx as "a very stiff neck". Also states her head feels too heavy for her neck. Objective: See treatment diary below      Assessment: Tolerated treatment  Fairly Well with modalities today ~ (MHP and STM) . Held on exer today, as pt stated the exercises really aggravated her neck last time. Patient would benefit from continued PT. Plan: Continue per plan of care. Precautions: neck pain, intermittent N/T  IE 23  RE 24    Date  12..23      Visit Count 1 2 3      FOTO         Pain In 9/10 9/10 9/10      Pain Out 9/10 9/10 "A little less"          Manuals  12..      Neck STM, cervical distraction 10' MY 10' MY STM 20 min                                   Neuro Re-Ed                                                                        Ther Ex   23      UBE NV 5 min alt every min *Held      Chin tuck Supine x10 Supine x15 Seated if marialuisa  *Held        Scapular retraction Supine x10/3"  Seated x10/3" Seated 15x/3" resume      Chin nod Supine x10 Supine 15x       DNF  NV       AROM cervical LF/ROT Supine 3x30" ea Seated 5x/5" pause ea dir *Held      BUE ER  10x  10x Yellow *Held      Trunk and neck extension over bolster/chair with BUE flex  Seated in chair 10x/5" hold *Held      Wall push up         MTP/LTP  RED  2x10 ea *hold      Wall angels                  Ther Activity         Postural focus with lifting from floor and table height                  Pt educ/self care   23         **Postural awareness  **Body mech.                Modalities   23 Cryo/MHP PRN MHP NV pre or post - pt preference MHP 10'   Cervical spine and UT  Post session  No adverse reaction MHP 10'   Cervical spine and UT to begin treatment session    No adverse reaction                 Access Code: [email or web address]  URL: [email or web address]  Date: 11/20/2023  Prepared by: Astrid Guevara    Exercises  - Supine Chin Tuck  - 1 x daily - 7 x weekly - 3 sets - 10 reps  - Supine Cervical Sidebending  - 1 x daily - 7 x weekly - 1 sets - 3 reps - 30 hold  - Beginner Head Nod  - 1 x daily - 7 x weekly - 3 sets - 10 reps  - Supine Cervical Rotation AROM on Pillow  - 1 x daily - 7 x weekly - 1 sets - 3 reps - 30 hold  - Supine Scapular Retraction  - 1 x daily - 7 x weekly - 3 sets - 10 reps  - Seated Scapular Retraction  - 1 x daily - 7 x weekly - 3 sets - 10 reps

## 2023-12-07 ENCOUNTER — APPOINTMENT (OUTPATIENT)
Dept: PHYSICAL THERAPY | Facility: CLINIC | Age: 43
End: 2023-12-07
Payer: COMMERCIAL

## 2023-12-07 NOTE — PROGRESS NOTES
"Daily Note     Today's date: 2023  Patient name: Alejandra Cope  : 1980  MRN: [id number]  Referring provider: Lexa Koroma, PT  Dx: No diagnosis found.               Subjective: ***      Objective: See treatment diary below      Assessment: Tolerated treatment {Tolerated treatment :}. Patient {assessment:[id number]}      Plan: {PLAN:}     Precautions: neck pain, intermittent N/T  IE 23  RE 24    Date  12..      Visit Count 1 2 3      FOTO         Pain In 9/10 9/10 9/10      Pain Out 9/10 9/10 \"A little less\"          Manuals ..      Neck STM, cervical distraction 10' MY 10' MY STM 20 min                                   Neuro Re-Ed                                                                        Ther Ex   23      UBE NV 5 min alt every min *Held      Chin tuck Supine x10 Supine x15 Seated if marialuisa  *Held        Scapular retraction Supine x10/3\"  Seated x10/3\" Seated 15x/3\" resume      Chin nod Supine x10 Supine 15x       DNF  NV       AROM cervical LF/ROT Supine 3x30\" ea Seated 5x/5\" pause ea dir *Held      BUE ER  10x  10x Yellow *Held      Trunk and neck extension over bolster/chair with BUE flex  Seated in chair 10x/5\" hold *Held      Wall push up         MTP/LTP  RED  2x10 ea *hold      Wall angels                  Ther Activity         Postural focus with lifting from floor and table height                  Pt educ/self care   23         **Postural awareness  **Body mech.               Modalities   23      Cryo/MHP PRN MHP NV pre or post - pt preference MHP 10'   Cervical spine and UT  Post session  No adverse reaction MHP 10'   Cervical spine and UT to begin treatment session    No adverse reaction                 Access Code: [email or web address]  URL: [email or web address]  Date: 2023  Prepared by: Ivy Lutz    Exercises  - Supine Chin Tuck  - 1 x daily - 7 x weekly - 3 sets - 10 reps  - Supine " Cervical Sidebending  - 1 x daily - 7 x weekly - 1 sets - 3 reps - 30 hold  - Beginner Head Nod  - 1 x daily - 7 x weekly - 3 sets - 10 reps  - Supine Cervical Rotation AROM on Pillow  - 1 x daily - 7 x weekly - 1 sets - 3 reps - 30 hold  - Supine Scapular Retraction  - 1 x daily - 7 x weekly - 3 sets - 10 reps  - Seated Scapular Retraction  - 1 x daily - 7 x weekly - 3 sets - 10 reps

## 2023-12-11 ENCOUNTER — APPOINTMENT (OUTPATIENT)
Dept: PHYSICAL THERAPY | Facility: CLINIC | Age: 43
End: 2023-12-11
Payer: COMMERCIAL

## 2023-12-14 ENCOUNTER — OFFICE VISIT (OUTPATIENT)
Dept: PAIN MEDICINE | Facility: CLINIC | Age: 43
End: 2023-12-14
Payer: COMMERCIAL

## 2023-12-14 ENCOUNTER — OFFICE VISIT (OUTPATIENT)
Dept: PHYSICAL THERAPY | Facility: CLINIC | Age: 43
End: 2023-12-14
Payer: COMMERCIAL

## 2023-12-14 VITALS
SYSTOLIC BLOOD PRESSURE: 155 MMHG | DIASTOLIC BLOOD PRESSURE: 95 MMHG | HEART RATE: 98 BPM | BODY MASS INDEX: 45.61 KG/M2 | WEIGHT: 257.4 LBS | HEIGHT: 63 IN

## 2023-12-14 DIAGNOSIS — M54.12 RADICULOPATHY, CERVICAL REGION: Primary | ICD-10-CM

## 2023-12-14 DIAGNOSIS — M54.2 NECK PAIN: Primary | ICD-10-CM

## 2023-12-14 DIAGNOSIS — M79.2 NEUROPATHIC PAIN: ICD-10-CM

## 2023-12-14 PROCEDURE — 97110 THERAPEUTIC EXERCISES: CPT

## 2023-12-14 PROCEDURE — 99214 OFFICE O/P EST MOD 30 MIN: CPT | Performed by: STUDENT IN AN ORGANIZED HEALTH CARE EDUCATION/TRAINING PROGRAM

## 2023-12-14 PROCEDURE — 97140 MANUAL THERAPY 1/> REGIONS: CPT

## 2023-12-14 RX ORDER — GABAPENTIN 300 MG/1
300 CAPSULE ORAL 3 TIMES DAILY
Qty: 90 CAPSULE | Refills: 0 | Status: SHIPPED | OUTPATIENT
Start: 2023-12-14 | End: 2024-01-13

## 2023-12-14 NOTE — PATIENT INSTRUCTIONS
Increase gabapentin to 300 mg two times a day for 3 days, then increase to three times a day after that. If having any concerning issues like drowsiness, call office to discuss.  Continue physical therapy/home exercise program.

## 2023-12-14 NOTE — PROGRESS NOTES
Daily Note     Today's date: 2023  Patient name: Tigre Ireland  : 1980  MRN: [id number]  Referring provider: Dimitris Grayson, PT  Dx:   Encounter Diagnosis     ICD-10-CM    1. Radiculopathy, cervical region  M54.12                      Subjective: I am still in a lot of pain  Pain at worse 9/10, best 5/10  Doing exer, but may not do all of them or as many  I get pain from doing the exer  Turning my head R/L , scap squeezes > pain Taking Tylenol, Gabapentin with MH helps to take the edge of pain off  I see PM later today      Objective: See treatment diary below      Assessment: Tolerated treatment fair co's of pain with ER, scap squeezes  good marialuisa with MT this date, indicating < pain/sx's end rx session  Demon > cerv mobility after MT  Pt demon LTP L cerv/UT mm  Pt presents with slight cerv rotation with noted mm tightness BL SCM mm  Reviewed AROM/gentle cerv stretchess, encourage carryover  Patient would benefit from continued PT      Plan: Continue per plan of care.       Precautions: neck pain, intermittent N/T  IE 23  RE 24    Date  12.4.23      Visit Count 1 2 3 4     FOTO         Pain In 9/10 9/10 9/10 9/10     Pain Out 9/10 9/10 "A little less" 7/10         Manuals  12.4.23      Neck STM, cervical distraction 10' MY 10' MY STM 20 min   STM/MFR,distraction, gentle ROM to marialuisa  25 min                                Neuro Re-Ed                                                                        Ther Ex   12.4.23      UBE NV 5 min alt every min *Held held     [employer] Supine x10 Supine x15 Seated if marialuisa  *Held   Supine  10x     Scapular retraction Supine x10/3"  Seated x10/3" Seated 15x/3" resume Review  Pt reports pain     Chin nod Supine x10 Supine 15x  Supine 10x     DNF  NV       AROM cervical LF/ROT Supine 3x30" ea Seated 5x/5" pause ea dir *Held 5x flex, SB, Rot, sub max     BUE ER  10x  10x Yellow *Held Attempted  pain     Trunk and neck extension over bolster/chair with BUE flex  Seated in chair 10x/5" hold *Held      Wall push up         MTP/LTP  RED  2x10 ea *hold Hold     Wall angels             Pt issued dysum with pt educ self MFR BL scalenes/pec mm, cerv ext /rot to pt ol     Ther Activity         Postural focus with lifting from floor and table height                  Pt educ/self care   12.4.23         **Postural awareness  **Body mech. Modalities   12.4.23      Cryo/MHP PRN MHP NV pre or post - pt preference MHP 10'   Cervical spine and UT  Post session  No adverse reaction MHP 10'   Cervical spine and UT to begin treatment session    No adverse reaction   MHP 10'   Cervical spine and UT to begin treatment session    No adverse reaction              Access Code: [email or web address]  URL: [email or web address]  Date: 11/20/2023  Prepared by: Wan Cartwright    Exercises  - Supine Chin Tuck  - 1 x daily - 7 x weekly - 3 sets - 10 reps  - Supine Cervical Sidebending  - 1 x daily - 7 x weekly - 1 sets - 3 reps - 30 hold  - Beginner Head Nod  - 1 x daily - 7 x weekly - 3 sets - 10 reps  - Supine Cervical Rotation AROM on Pillow  - 1 x daily - 7 x weekly - 1 sets - 3 reps - 30 hold  - Supine Scapular Retraction  - 1 x daily - 7 x weekly - 3 sets - 10 reps  - Seated Scapular Retraction  - 1 x daily - 7 x weekly - 3 sets - 10 reps

## 2023-12-14 NOTE — PROGRESS NOTES
"Assessment:  1. Neck pain    2. Neuropathic pain      Portions of the record may have been created with voice recognition software. Occasional wrong word or \"sound a like\" substitutions may have occurred due to the inherent limitations of voice recognition software. Read the chart carefully and recognize, using context, where substitutions have occurred. Contact me with any questions.       Plan:  43-year-old female here for follow-up with regards to left-sided neck and LUE symptoms.  Since last visit, patient notes gradual improvement in symptoms with physical therapy, completion of Medrol Dosepak and initiation of gabapentin regimen.  Patient notes radicular component of pain seems to have resolved at this time and pain is mostly axial in left-sided neck at this time.  Denies paresthesias, new or progressive weakness, balance/gait issues.    Given gradual improvement with conservative treatment, advised to continue physical therapy/home exercise program at this time.    Increase gabapentin to 300 mg 3 times daily.  Titration instructions provided.  Risks and benefit discussed.    If symptoms persist or worsen, consider further evaluation with cervical spine MRI and consideration of interventional options.      My impressions and treatment recommendations were discussed in detail with the patient who verbalized understanding and had no further questions.  Discharge instructions were provided. I personally saw and examined the patient and I agree with the above discussed plan of care.      New Medications Ordered This Visit   Medications    gabapentin (NEURONTIN) 300 mg capsule     Sig: Take 1 capsule (300 mg total) by mouth 3 (three) times a day     Dispense:  90 capsule     Refill:  0       History of Present Illness:  Alejandra Cope is a 43 y.o. female who presents for a follow up office visit in regards to Neck Pain and Back Pain.  Symptoms are mostly left-sided.  Notes about 30% improvement in symptoms since " last visit.  Denies significant new issues or concerns today.    Since last visit, she has started gabapentin 300 mg nightly which is helping some and denies significant side effects at this time.    I have personally reviewed and/or updated the patient's past medical history, past surgical history, family history, social history, current medications, allergies, and vital signs today.     Review of Systems   Eyes:  Negative for visual disturbance.   Respiratory:  Negative for shortness of breath.    Cardiovascular:  Negative for chest pain.   Gastrointestinal:  Negative for constipation, diarrhea and nausea.   Musculoskeletal:  Positive for arthralgias, gait problem and myalgias. Negative for joint swelling.        Difficulty Walking   Joint stiffness    Skin:  Negative for rash.   Neurological:  Negative for dizziness, seizures and weakness.       Patient Active Problem List   Diagnosis    Abnormal uterine bleeding (AUB)    Asthma    Well adult exam    Cervicalgia    Primary insomnia    Psoriasis    Essential hypertension    Anxiety    Thyroid nodule    Precordial pain    Orthostatic dizziness    Numbness and tingling in both hands    Recurrent major depressive disorder, in partial remission (HCC)    Migraine    Environmental allergies    Class 3 severe obesity due to excess calories without serious comorbidity with body mass index (BMI) of 40.0 to 44.9 in adult (formerly [facility])    Dyshidrotic eczema       Past Medical History:   Diagnosis Date    Anxiety     Asthma     Class 3 severe obesity due to excess calories without serious comorbidity with body mass index (BMI) of 40.0 to 44.9 in adult (HCC) 2022    Complete spontaneous  without complication     FIVE TIMES    Concussion     Depression     Headache     Hypertension     Insomnia     Normal delivery     DAUGHTER    Normal delivery     SON    Pelvic inflammatory disease     RESOLVED: 18    Post concussion syndrome 2019    Recurrent pregnancy loss  (CODE)     Thyroid nodule 03/02/2019       Past Surgical History:   Procedure Laterality Date    CHOLECYSTECTOMY      CYSTOSCOPY N/A 12/28/2016    Procedure: CYSTOSCOPY;  Surgeon: Kamryn Bloom MD;  [facility];  Service:     HYSTERECTOMY      LAPAROSCOPIC CHOLECYSTECTOMY      RESOLVED: 2006    NASAL SEPTUM SURGERY      REESOLVED: 1999; DEVIATION    MA LAPAROSCOPY W TOTAL HYSTERECTOMY UTERUS 250 GM/< N/A 12/28/2016    Procedure: HYSTERECTOMY LAPAROSCOPIC TOTAL ;  Surgeon: Kamryn Bloom MD;  [facility];  Service: Gynecology    SALPINGECTOMY Bilateral 12/28/2016    Procedure: SALPINGECTOMY;  Surgeon: Kamryn Bloom MD;  [facility];  Service:     TONSILLECTOMY      RESOLVED: 1997    TUBAL LIGATION      RESOLVED: 1/22/14       Family History   Problem Relation Age of Onset    Venous thrombosis Mother         ACUTE; OF DEEP VESSELS OF THE DISTAL LOWER EXTREMITY    COPD Mother     Heart disease Mother     Hypertension Mother     Mental illness Mother     Colon cancer Father 59    No Known Problems Daughter     Kidney disease Maternal Grandmother     No Known Problems Maternal Grandfather     Breast cancer Paternal Grandmother 40    Diabetes Paternal Grandfather         MELLITUS    No Known Problems Son     No Known Problems Maternal Aunt     No Known Problems Maternal Aunt        Social History     Occupational History    Occupation: employed   Tobacco Use    Smoking status: Former     Types: Cigars    Smokeless tobacco: Never   Vaping Use    Vaping status: Never Used   Substance and Sexual Activity    Alcohol use: Yes     Comment: socially    Drug use: No    Sexual activity: Yes     Partners: Male     Birth control/protection: None       Current Outpatient Medications on File Prior to Visit   Medication Sig    albuterol (PROVENTIL HFA,VENTOLIN HFA) 90 mcg/act inhaler INHALE 2 PUFFS EVERY 6 HOURS AS NEEDED FOR WHEEZING    amitriptyline (ELAVIL) 25 mg tablet Take 1 tablet (25 mg total) by mouth daily at  "bedtime    buPROPion (WELLBUTRIN XL) 150 mg 24 hr tablet Take 1 tablet (150 mg total) by mouth every morning    famotidine (PEPCID) 40 MG tablet Take 1 tablet (40 mg total) by mouth daily at bedtime    lisinopril (ZESTRIL) 30 mg tablet TAKE 1 TABLET BY MOUTH EVERY DAY    methocarbamol (ROBAXIN) 500 mg tablet Take 1 tablet up to three times a day as needed    methylPREDNISolone 4 MG tablet therapy pack Use as directed on package    omeprazole (PriLOSEC) 20 mg delayed release capsule TAKE 1 CAPSULE DAILY    hydrocortisone-pramoxine (ANALPRAM-HC) 2.5-1 % rectal cream Apply topically 2 (two) times a day for 7 days Apply to rectum/ anus. (Patient not taking: Reported on 11/14/2023)    traZODone (DESYREL) 50 mg tablet take 1 tablet by mouth at bedtime (Patient not taking: Reported on 11/14/2023)    triamcinolone (KENALOG) 0.5 % cream Apply topically 2 (two) times a day (Patient not taking: Reported on 11/14/2023)     Current Facility-Administered Medications on File Prior to Visit   Medication    hydrocortisone (ANUSOL-HC) 2.5 % rectal cream       No Known Allergies    Physical Exam:    /95 (BP Location: Right arm, Patient Position: Sitting, Cuff Size: Large)   Pulse 98   Ht 5' 3\" (1.6 m)   Wt 117 kg (257 lb 6.4 oz)   LMP 12/01/2016   BMI 45.60 kg/m²     Constitutional:normal, well developed, well nourished, alert, in no distress and non-toxic and no overt pain behavior.  Eyes:anicteric  HEENT:grossly intact  Neck:supple, symmetric, trachea midline and no masses   Pulmonary:even and unlabored  Cardiovascular:No edema or pitting edema present  Skin:Normal without rashes or lesions and well hydrated  Psychiatric:Mood and affect appropriate  Neurologic:Cranial Nerves II-XII grossly intact  Musculoskeletal:normal gait      Imaging    No new relevant imaging available for review.      "

## 2023-12-19 ENCOUNTER — OFFICE VISIT (OUTPATIENT)
Dept: PHYSICAL THERAPY | Facility: CLINIC | Age: 43
End: 2023-12-19
Payer: COMMERCIAL

## 2023-12-19 DIAGNOSIS — M54.12 RADICULOPATHY, CERVICAL REGION: Primary | ICD-10-CM

## 2023-12-19 PROCEDURE — 97140 MANUAL THERAPY 1/> REGIONS: CPT

## 2023-12-19 PROCEDURE — 97535 SELF CARE MNGMENT TRAINING: CPT

## 2023-12-19 NOTE — PROGRESS NOTES
"Daily Note     Today's date: 2023  Patient name: Alejandra Cope  : 1980  MRN: [id number]  Referring provider: Lexa Koroma, PT  Dx:   Encounter Diagnosis     ICD-10-CM    1. Radiculopathy, cervical region  M54.12                      Subjective: Pt. States current pain level @ cervical region = \"8\"10.  States she did not tolerate ther exer at last treatment visit.       Objective: See treatment diary below      Assessment: Tolerated treatment well with MHP and MT apps. Patient would benefit from continued PT.  *Fore-went and Held ther exer today, as pt is not tolerating exer right now.       Plan: Continue per plan of care.  Progress treatment as tolerated.             Precautions: neck pain, intermittent N/T  IE 23  RE 24    Date  12..23  12..    Visit Count 1 2 3 4 5    FOTO         Pain In 9/10 9/10 9/10 9/10 8/10    Pain Out 9/10 9/10 \"A little less\" 7/10 7/10        Manuals  12..23  12..    Neck STM, cervical distraction 10' MY 10' MY STM 20 min   STM/MFR,distraction, gentle ROM to marialuisa  25 min STM/MFR,distraction, gentle ROM to marialuisa  25 min                               Neuro Re-Ed                                                                        Ther Ex   12.  12.23    UBE NV 5 min alt every min *Held held *Held    Chin tuck Supine x10 Supine x15 Seated if marialuisa  *Held   Supine  10x *Held    Scapular retraction Supine x10/3\"  Seated x10/3\" Seated 15x/3\" resume Review  Pt reports pain *Held    Chin nod Supine x10 Supine 15x  Supine 10x *Held    DNF  NV       AROM cervical LF/ROT Supine 3x30\" ea Seated 5x/5\" pause ea dir *Held 5x flex, SB, Rot, sub max *Reviewed for HEP    BUE ER  10x  10x Yellow *Held Attempted  pain *Held    Trunk and neck extension over bolster/chair with BUE flex  Seated in chair 10x/5\" hold *Held  *Held    Wall push up         MTP/LTP  RED  2x10 ea *hold Hold *Held    Wall angels             Pt issued dysum with " pt educ self MFR BL scalenes/pec mm, cerv ext /rot to pt ol     Ther Activity         Postural focus with lifting from floor and table height                  Pt educ/self care   12.4.23 12.19.23       **Postural awareness  **Body mech.  *ReviewedPostural awareness  **Body mech             Modalities   12.4.23 12.19.23    Cryo/MHP PRN MHP NV pre or post - pt preference MHP 10'   Cervical spine and UT  Post session  No adverse reaction MHP 10'   Cervical spine and UT to begin treatment session    No adverse reaction   MHP 10'   Cervical spine and UT to begin treatment session    No adverse reaction MHP 10'   Cervical spine and UT to begin treatment session    No adverse reaction             Access Code: [email or web address]  URL: [email or web address]  Date: 11/20/2023  Prepared by: Ivy Lutz    Exercises  - Supine Chin Tuck  - 1 x daily - 7 x weekly - 3 sets - 10 reps  - Supine Cervical Sidebending  - 1 x daily - 7 x weekly - 1 sets - 3 reps - 30 hold  - Beginner Head Nod  - 1 x daily - 7 x weekly - 3 sets - 10 reps  - Supine Cervical Rotation AROM on Pillow  - 1 x daily - 7 x weekly - 1 sets - 3 reps - 30 hold  - Supine Scapular Retraction  - 1 x daily - 7 x weekly - 3 sets - 10 reps  - Seated Scapular Retraction  - 1 x daily - 7 x weekly - 3 sets - 10 reps

## 2023-12-21 ENCOUNTER — OFFICE VISIT (OUTPATIENT)
Dept: PHYSICAL THERAPY | Facility: CLINIC | Age: 43
End: 2023-12-21
Payer: COMMERCIAL

## 2023-12-21 DIAGNOSIS — M54.12 RADICULOPATHY, CERVICAL REGION: Primary | ICD-10-CM

## 2023-12-21 DIAGNOSIS — K21.9 GASTROESOPHAGEAL REFLUX DISEASE, UNSPECIFIED WHETHER ESOPHAGITIS PRESENT: ICD-10-CM

## 2023-12-21 DIAGNOSIS — G43.809 OTHER MIGRAINE WITHOUT STATUS MIGRAINOSUS, NOT INTRACTABLE: ICD-10-CM

## 2023-12-21 PROCEDURE — 97140 MANUAL THERAPY 1/> REGIONS: CPT

## 2023-12-21 PROCEDURE — 97535 SELF CARE MNGMENT TRAINING: CPT

## 2023-12-21 RX ORDER — FAMOTIDINE 40 MG/1
40 TABLET, FILM COATED ORAL
Qty: 90 TABLET | Refills: 1 | Status: SHIPPED | OUTPATIENT
Start: 2023-12-21

## 2023-12-21 NOTE — PROGRESS NOTES
"Daily Note     Today's date: 2023  Patient name: Alejandra Cope  : 1980  MRN: [id number]  Referring provider: Lexa Koroma, PT  Dx:   Encounter Diagnosis     ICD-10-CM    1. Radiculopathy, cervical region  M54.12                      Subjective:   Pt. reports her cervical region is \"not good\".  States her pain level is = \"9\"/10 at this present time. And she is experiencing headaches, as well. Voices her frustration re: having to wait for an MRI. Feels she should have had imaging performed at onset of her cerv issue.       Objective: See treatment diary below      Assessment:  Tolerated treatment  Well with MHP applic, and Fairly Well with MT  .  Patient would benefit from continued PT.  *Ther exer held again today, as per pt request.       Plan: Continue per plan of care.  Progress treatment as tolerated.               Precautions: neck pain, intermittent N/T  IE 23  RE 24    Date  12..23  12. 12..23   Visit Count 1 2 3 4 5 6   FOTO         Pain In 9/10 9/10 9/10 9/10 8/10 9/10   Pain Out 9/10 9/10 \"A little less\" 7/10 7/10 8-9/10       Manuals  12..23  12..23 12..23   Neck STM, cervical distraction 10' MY 10' MY STM 20 min   STM/MFR,distraction, gentle ROM to marialuisa  25 min STM/MFR,distraction, gentle ROM to marialuisa  25 min STM/MFR,distraction, gentle ROM to marialuisa  25 min                              Neuro Re-Ed                                                                        Ther Ex   12..23  12..23 12..23   UBE NV 5 min alt every min *Held held *Held *Held   Chin tuck Supine x10 Supine x15 Seated if marialuisa  *Held   Supine  10x *Held *Held   Scapular retraction Supine x10/3\"  Seated x10/3\" Seated 15x/3\" resume Review  Pt reports pain *Held *Held   Chin nod Supine x10 Supine 15x  Supine 10x *Held *Held   DNF  NV       AROM cervical LF/ROT Supine 3x30\" ea Seated 5x/5\" pause ea dir *Held 5x flex, SB, Rot, sub max *Reviewed for HEP    BUE ER  " "10x  10x Yellow *Held Attempted  pain *Held *Held   Trunk and neck extension over bolster/chair with BUE flex  Seated in chair 10x/5\" hold *Held  *Held *Held   Wall push up         MTP/LTP  RED  2x10 ea *hold Hold *Held *Held   Wall angels             Pt issued dysum with pt educ self MFR BL scalenes/pec mm, cerv ext /rot to pt ol     Ther Activity         Postural focus with lifting from floor and table height                  Pt educ/self care   12.4.23 12.19.23 12.21.23      **Postural awareness  **Body mech.  *ReviewedPostural awareness  **Body mech ReviewedPostural awareness  **Body mech            Modalities   12.4.23 12.19.23 12.21.23   Cryo/MHP PRN MHP NV pre or post - pt preference MHP 10'   Cervical spine and UT  Post session  No adverse reaction MHP 10'   Cervical spine and UT to begin treatment session    No adverse reaction   MHP 10'   Cervical spine and UT to begin treatment session    No adverse reaction MHP 10'   Cervical spine and UT to begin treatment session    No adverse reaction MHP 10'   Cervical spine and UT to begin treatment session    No adverse reaction            Access Code: [email or web address]  URL: [email or web address]  Date: 11/20/2023  Prepared by: Ivy Lutz    Exercises  - Supine Chin Tuck  - 1 x daily - 7 x weekly - 3 sets - 10 reps  - Supine Cervical Sidebending  - 1 x daily - 7 x weekly - 1 sets - 3 reps - 30 hold  - Beginner Head Nod  - 1 x daily - 7 x weekly - 3 sets - 10 reps  - Supine Cervical Rotation AROM on Pillow  - 1 x daily - 7 x weekly - 1 sets - 3 reps - 30 hold  - Supine Scapular Retraction  - 1 x daily - 7 x weekly - 3 sets - 10 reps  - Seated Scapular Retraction  - 1 x daily - 7 x weekly - 3 sets - 10 reps                 "

## 2023-12-22 DIAGNOSIS — I10 ESSENTIAL HYPERTENSION: ICD-10-CM

## 2023-12-22 RX ORDER — LISINOPRIL 30 MG/1
30 TABLET ORAL DAILY
Qty: 90 TABLET | Refills: 1 | Status: SHIPPED | OUTPATIENT
Start: 2023-12-22

## 2023-12-22 RX ORDER — AMITRIPTYLINE HYDROCHLORIDE 25 MG/1
25 TABLET, FILM COATED ORAL
Qty: 90 TABLET | Refills: 1 | Status: SHIPPED | OUTPATIENT
Start: 2023-12-22

## 2023-12-28 ENCOUNTER — APPOINTMENT (OUTPATIENT)
Dept: PHYSICAL THERAPY | Facility: CLINIC | Age: 43
End: 2023-12-28
Payer: COMMERCIAL

## 2023-12-29 ENCOUNTER — OFFICE VISIT (OUTPATIENT)
Dept: URGENT CARE | Facility: CLINIC | Age: 43
End: 2023-12-29
Payer: COMMERCIAL

## 2023-12-29 VITALS
DIASTOLIC BLOOD PRESSURE: 86 MMHG | OXYGEN SATURATION: 99 % | TEMPERATURE: 98.1 F | SYSTOLIC BLOOD PRESSURE: 163 MMHG | HEART RATE: 78 BPM | RESPIRATION RATE: 18 BRPM

## 2023-12-29 DIAGNOSIS — R68.89 FLU-LIKE SYMPTOMS: Primary | ICD-10-CM

## 2023-12-29 DIAGNOSIS — R05.1 ACUTE COUGH: ICD-10-CM

## 2023-12-29 LAB
SARS-COV-2 AG UPPER RESP QL IA: NEGATIVE
VALID CONTROL: NORMAL

## 2023-12-29 PROCEDURE — 99213 OFFICE O/P EST LOW 20 MIN: CPT | Performed by: PHYSICIAN ASSISTANT

## 2023-12-29 PROCEDURE — 87636 SARSCOV2 & INF A&B AMP PRB: CPT | Performed by: PHYSICIAN ASSISTANT

## 2023-12-29 PROCEDURE — 87811 SARS-COV-2 COVID19 W/OPTIC: CPT | Performed by: PHYSICIAN ASSISTANT

## 2023-12-29 RX ORDER — PREDNISONE 10 MG/1
TABLET ORAL
Qty: 26 TABLET | Refills: 0 | Status: SHIPPED | OUTPATIENT
Start: 2023-12-29

## 2023-12-29 NOTE — LETTER
December 29, 2023     Patient: Alejandra Cope   YOB: 1980   Date of Visit: 12/29/2023       To Whom It May Concern:    It is my medical opinion that Alejandra Cope should not return to work until 1/1/2024.    If you have any questions or concerns, please don't hesitate to call.         Sincerely,        Michelle Behler, PA-C    CC: No Recipients

## 2023-12-29 NOTE — PROGRESS NOTES
[facility]    NAME: Alejandra Cope is a 43 y.o. female  : 1980    MRN: [id number]  DATE: 2023  TIME: 9:37 AM    Assessment and Plan   Flu-like symptoms [R68.89]  1. Flu-like symptoms  predniSONE 10 mg tablet      2. Acute cough  Poct Covid 19 Rapid Antigen Test    Covid/Flu-Office Collect          Patient Instructions     Patient Instructions     Vitamin D3 2000 IU daily  Vitamin C 1g every 12 hours  Multivitamin daily  Fluids and rest  Over the counter cold medication as needed  Follow up with PCP in 3-5 days.  Proceed to  ER if symptoms worsen.        Chief Complaint     Chief Complaint   Patient presents with    Cough    Earache     And body aches        History of Present Illness   43 year old female here with complaint of cold symptoms, cough, nausea and diarrhea for the last 4 to 5 days.  Also had chills and fever in the beginning.  Has had bodyaches and now her ears hurt.  Exposed to flu.  Asthma has been flaring as well.        Review of Systems   Review of Systems   Constitutional:  Positive for chills and fever. Negative for appetite change.   HENT:  Positive for congestion, postnasal drip and sore throat. Negative for ear discharge, ear pain, facial swelling, sinus pressure and sneezing.    Respiratory:  Positive for cough and wheezing. Negative for shortness of breath.    Gastrointestinal:  Positive for diarrhea and nausea.   Neurological:  Negative for headaches.       Current Medications     Current Outpatient Medications:     predniSONE 10 mg tablet, Take 3 tabs BID X 2 days, 2 tabs BID X 2 days, 1 tab BID X 2 days, 1 tab daily X 2 days, Disp: 26 tablet, Rfl: 0    albuterol (PROVENTIL HFA,VENTOLIN HFA) 90 mcg/act inhaler, INHALE 2 PUFFS EVERY 6 HOURS AS NEEDED FOR WHEEZING, Disp: 18 g, Rfl: 2    amitriptyline (ELAVIL) 25 mg tablet, TAKE 1 TABLET BY MOUTH DAILY AT BEDTIME, Disp: 90 tablet, Rfl: 1    buPROPion (WELLBUTRIN XL) 150 mg 24 hr tablet, Take 1 tablet (150 mg total)  by mouth every morning, Disp: 30 tablet, Rfl: 2    famotidine (PEPCID) 40 MG tablet, TAKE 1 TABLET BY MOUTH DAILY AT BEDTIME, Disp: 90 tablet, Rfl: 1    gabapentin (NEURONTIN) 300 mg capsule, Take 1 capsule (300 mg total) by mouth 3 (three) times a day, Disp: 90 capsule, Rfl: 0    hydrocortisone-pramoxine (ANALPRAM-HC) 2.5-1 % rectal cream, Apply topically 2 (two) times a day for 7 days Apply to rectum/ anus. (Patient not taking: Reported on 2023), Disp: 4 g, Rfl: 1    lisinopril (ZESTRIL) 30 mg tablet, TAKE 1 TABLET BY MOUTH EVERY DAY, Disp: 90 tablet, Rfl: 1    methocarbamol (ROBAXIN) 500 mg tablet, Take 1 tablet up to three times a day as needed, Disp: 90 tablet, Rfl: 0    methylPREDNISolone 4 MG tablet therapy pack, Use as directed on package, Disp: 1 each, Rfl: 0    omeprazole (PriLOSEC) 20 mg delayed release capsule, TAKE 1 CAPSULE DAILY, Disp: 30 capsule, Rfl: 1    traZODone (DESYREL) 50 mg tablet, take 1 tablet by mouth at bedtime (Patient not taking: Reported on 2023), Disp: 30 tablet, Rfl: 0    triamcinolone (KENALOG) 0.5 % cream, Apply topically 2 (two) times a day (Patient not taking: Reported on 2023), Disp: 15 g, Rfl: 0  No current facility-administered medications for this visit.    Facility-Administered Medications Ordered in Other Visits:     hydrocortisone (ANUSOL-HC) 2.5 % rectal cream, , Topical, 4x Daily PRN, Jacob Casper MD    Current Allergies     Allergies as of 2023    (No Known Allergies)          The following portions of the patient's history were reviewed and updated as appropriate: allergies, current medications, past family history, past medical history, past social history, past surgical history and problem list.   Past Medical History:   Diagnosis Date    Anxiety     Asthma     Class 3 severe obesity due to excess calories without serious comorbidity with body mass index (BMI) of 40.0 to 44.9 in adult ([facility]) 2022    Complete spontaneous  without  complication     FIVE TIMES    Concussion     Depression     Headache     Hypertension     Insomnia     Normal delivery     DAUGHTER    Normal delivery     SON    Pelvic inflammatory disease     RESOLVED: 1/8/16    Post concussion syndrome 11/11/2019    Recurrent pregnancy loss (CODE)     Thyroid nodule 03/02/2019     Past Surgical History:   Procedure Laterality Date    CHOLECYSTECTOMY      CYSTOSCOPY N/A 12/28/2016    Procedure: CYSTOSCOPY;  Surgeon: Kamryn Bloom MD;  [facility];  Service:     HYSTERECTOMY      LAPAROSCOPIC CHOLECYSTECTOMY      RESOLVED: 2006    NASAL SEPTUM SURGERY      REESOLVED: 1999; DEVIATION    HI LAPAROSCOPY W TOTAL HYSTERECTOMY UTERUS 250 GM/< N/A 12/28/2016    Procedure: HYSTERECTOMY LAPAROSCOPIC TOTAL ;  Surgeon: Kamryn Bloom MD;  [facility];  Service: Gynecology    SALPINGECTOMY Bilateral 12/28/2016    Procedure: SALPINGECTOMY;  Surgeon: Kamryn Bloom MD;  [facility];  Service:     TONSILLECTOMY      RESOLVED: 1997    TUBAL LIGATION      RESOLVED: 1/22/14     Family History   Problem Relation Age of Onset    Venous thrombosis Mother         ACUTE; OF DEEP VESSELS OF THE DISTAL LOWER EXTREMITY    COPD Mother     Heart disease Mother     Hypertension Mother     Mental illness Mother     Colon cancer Father 59    No Known Problems Daughter     Kidney disease Maternal Grandmother     No Known Problems Maternal Grandfather     Breast cancer Paternal Grandmother 40    Diabetes Paternal Grandfather         MELLITUS    No Known Problems Son     No Known Problems Maternal Aunt     No Known Problems Maternal Aunt      Social History     Socioeconomic History    Marital status: /Civil Union     Spouse name: Not on file    Number of children: Not on file    Years of education: Not on file    Highest education level: Not on file   Occupational History    Occupation: employed   Tobacco Use    Smoking status: Former     Types: Cigars    Smokeless tobacco: Never   Vaping Use     Vaping status: Never Used   Substance and Sexual Activity    Alcohol use: Yes     Comment: socially    Drug use: No    Sexual activity: Yes     Partners: Male     Birth control/protection: None   Other Topics Concern    Not on file   Social History Narrative    [employer] USE        Employed Full Time- [employer] -      Social Determinants of Health     Financial Resource Strain: Not on file   Food Insecurity: Not on file   Transportation Needs: Not on file   Physical Activity: Not on file   Stress: Not on file   Social Connections: Not on file   Intimate Partner Violence: Not on file   Housing Stability: Not on file     Medications have been verified.    Objective   /86   Pulse 78   Temp 98.1 °F (36.7 °C)   Resp 18   LMP 12/01/2016   SpO2 99%      Physical Exam   Physical Exam  Vitals and nursing note reviewed.   Constitutional:       General: She is not in acute distress.     Appearance: She is well-developed.   HENT:      Head: Normocephalic and atraumatic.      Right Ear: Tympanic membrane normal.      Left Ear: Tympanic membrane normal.      Nose: Congestion present. No mucosal edema.      Right Sinus: No maxillary sinus tenderness or frontal sinus tenderness.      Left Sinus: No maxillary sinus tenderness or frontal sinus tenderness.      Mouth/Throat:      Pharynx: Posterior oropharyngeal erythema present. No oropharyngeal exudate.   Eyes:      Conjunctiva/sclera: Conjunctivae normal.   Cardiovascular:      Rate and Rhythm: Normal rate and regular rhythm.      Heart sounds: Normal heart sounds. No murmur heard.  Pulmonary:      Effort: Pulmonary effort is normal.      Breath sounds: Normal breath sounds. No wheezing.   Abdominal:      General: Abdomen is flat.      Tenderness: There is no abdominal tenderness.

## 2023-12-29 NOTE — PATIENT INSTRUCTIONS
Vitamin D3 2000 IU daily  Vitamin C 1g every 12 hours  Multivitamin daily  Fluids and rest  Over the counter cold medication as needed  Follow up with PCP in 3-5 days.  Proceed to  ER if symptoms worsen.

## 2023-12-30 LAB
FLUAV RNA RESP QL NAA+PROBE: POSITIVE
FLUBV RNA RESP QL NAA+PROBE: NEGATIVE
SARS-COV-2 RNA RESP QL NAA+PROBE: NEGATIVE

## 2024-01-02 ENCOUNTER — APPOINTMENT (OUTPATIENT)
Dept: PHYSICAL THERAPY | Facility: CLINIC | Age: 44
End: 2024-01-02
Payer: COMMERCIAL

## 2024-01-04 ENCOUNTER — OFFICE VISIT (OUTPATIENT)
Dept: PHYSICAL THERAPY | Facility: CLINIC | Age: 44
End: 2024-01-04
Payer: COMMERCIAL

## 2024-01-04 DIAGNOSIS — M54.12 RADICULOPATHY, CERVICAL REGION: Primary | ICD-10-CM

## 2024-01-04 PROCEDURE — 97110 THERAPEUTIC EXERCISES: CPT

## 2024-01-04 NOTE — PROGRESS NOTES
"Daily Note     Today's date: 2024  Patient name: Alejandra Cope  : 1980  MRN: [id number]  Referring provider: Lexa Koroma, PT  Dx:   Encounter Diagnosis     ICD-10-CM    1. Radiculopathy, cervical region  M54.12                      Subjective:   Pt. denies any pain or sx @ this present time. Says she's been feeling pretty good, which she relates to having a lot of down time the past couple weeks.       Objective: See treatment diary below      Assessment: Tolerated treatment well.  Patient exhibited good technique with therapeutic exercises and would benefit from continued PT.        Plan: Continue per plan of care.  Progress treatment as tolerated.               Precautions: neck pain, intermittent N/T  IE 23  RE 24    Date 24 12..23  12..23 12..23   Visit Count 7 2 3 4 5 6   FOTO         Pain In 0/10 9/10 9/10 9/10 8/10 9/10   Pain Out 0/10 9/10 \"A little less\" 7/10 7/10 8-9/10       Manuals . 12..23  12..23 12.21.23   Neck STM, cervical distraction  10' MY STM 20 min   STM/MFR,distraction, gentle ROM to marialuisa  25 min STM/MFR,distraction, gentle ROM to marialuisa  25 min STM/MFR,distraction, gentle ROM to marialuisa  25 min                              Neuro Re-Ed                                                                        Ther Ex .  12..23  12..23 12.21.23    RPM  8 min alt every 2 mins 5 min alt every min *Held held *Held *Held   Chin tuck Seated 20x Supine x15 Seated if marialuisa  *Held   Supine  10x *Held *Held   Scapular retraction   Seated x15/3\" Seated 15x/3\" resume Review  Pt reports pain *Held *Held   Chin nod 15x Supine 15x  Supine 10x *Held *Held   DNF  NV       AROM cervical LF/ROT *Reviewed for HEP Seated 5x/5\" pause ea dir *Held 5x flex, SB, Rot, sub max *Reviewed for HEP    BUE ER  10x  10x Yellow *Held Attempted  pain *Held *Held   Trunk and neck extension over bolster/chair with BUE flex Seated in chair 15x/5\" hold Seated in " "chair 10x/5\" hold *Held  *Held *Held   Wall push up **2x10          MTP/LTP RED  2x10 ea RED  2x10 ea *hold Hold *Held *Held   Wall angels             Pt issued dysum with pt educ self MFR BL scalenes/pec mm, cerv ext /rot to pt ol     Ther Activity         Postural focus with lifting from floor and table height                  Pt educ/self care 1.4.24  12.4.23  12.19.23 12.21.23    ReviewedPostural awareness  **Body mech  **Postural awareness  **Body mech.  *ReviewedPostural awareness  **Body mech ReviewedPostural awareness  **Body mech            Modalities 1.4.24  12.4.23 12.19.23 12.21.23   Cryo/MHP PRN MHP 10'   Cervical spine and UT to conclude treatment session    No adverse reaction MHP 10'   Cervical spine and UT  Post session  No adverse reaction MHP 10'   Cervical spine and UT to begin treatment session    No adverse reaction   MHP 10'   Cervical spine and UT to begin treatment session    No adverse reaction MHP 10'   Cervical spine and UT to begin treatment session    No adverse reaction MHP 10'   Cervical spine and UT to begin treatment session    No adverse reaction            Access Code: [email or web address]  URL: [email or web address]  Date: 11/20/2023  Prepared by: Ivy Lutz    Exercises  - Supine Chin Tuck  - 1 x daily - 7 x weekly - 3 sets - 10 reps  - Supine Cervical Sidebending  - 1 x daily - 7 x weekly - 1 sets - 3 reps - 30 hold  - Beginner Head Nod  - 1 x daily - 7 x weekly - 3 sets - 10 reps  - Supine Cervical Rotation AROM on Pillow  - 1 x daily - 7 x weekly - 1 sets - 3 reps - 30 hold  - Supine Scapular Retraction  - 1 x daily - 7 x weekly - 3 sets - 10 reps  - Seated Scapular Retraction  - 1 x daily - 7 x weekly - 3 sets - 10 reps                   "

## 2024-01-09 ENCOUNTER — OFFICE VISIT (OUTPATIENT)
Dept: PHYSICAL THERAPY | Facility: CLINIC | Age: 44
End: 2024-01-09
Payer: COMMERCIAL

## 2024-01-09 DIAGNOSIS — M54.12 RADICULOPATHY, CERVICAL REGION: Primary | ICD-10-CM

## 2024-01-09 PROCEDURE — 97140 MANUAL THERAPY 1/> REGIONS: CPT

## 2024-01-09 PROCEDURE — 97110 THERAPEUTIC EXERCISES: CPT

## 2024-01-09 NOTE — PROGRESS NOTES
"Daily Note     Today's date: 2024  Patient name: Alejandra Cope  : 1980  MRN: [id number]  Referring provider: Lexa Koroma, PT  Dx:   Encounter Diagnosis     ICD-10-CM    1. Radiculopathy, cervical region  M54.12                      Subjective:   Pt. reports pain level = \"6\"/10 @ this present time.  Notes she worked 3 -12 hour shifts in a row the past 3 days, which she feels is the cause of current pain level.       Objective: See treatment diary below      Assessment: Tolerated treatment well.  Patient exhibited good technique with therapeutic exercises and would benefit from continued PT.   Pain level decreased by 2 levels with exer., and another 2 levels after MHP applic.       Plan: Continue per plan of care.  Progress treatment as tolerated.               Precautions: neck pain, intermittent N/T  IE 23  RE 24    Date . 1..24 12..23  12..23 12..23   Visit Count 7 8 3 4 5 6   FOTO         Pain In 0/10 6/10    4/10 w/exer 9/10 9/10 8/10 9/10   Pain Out 0/10 2/10 after MHP applic.   \"A little less\" 7/10 7/10 8-9/10       Manuals 1.4.24 1.9.24 12..23  12..23 12..23   Neck STM, cervical distraction  STM/MFR,distraction, gentle ROM to marialuisa  15 min STM 20 min   STM/MFR,distraction, gentle ROM to marialuisa  25 min STM/MFR,distraction, gentle ROM to marialuisa  25 min STM/MFR,distraction, gentle ROM to marialuisa  25 min                              Neuro Re-Ed                                                                        Ther Ex 1.4.24 1.9.24 12..23  12.19.23 12.21.23    RPM  8 min alt every 2 mins 120 RPM  10 min alt every 2 mins *Held held *Held *Held   Chin tuck Seated 20x Seated 20x Seated if marialuisa  *Held   Supine  10x *Held *Held   Scapular retraction   Seated x15/3\" Seated 15x/3\" resume Review  Pt reports pain *Held *Held   Chin nod 15x Supine 15x  Supine 10x *Held *Held   DNF         AROM cervical LF/ROT *Reviewed for HEP  *Held 5x flex, SB, Rot, sub max *Reviewed for " "HEP    BUE ER   *Held Attempted  pain *Held *Held   Trunk and neck extension over bolster/chair with BUE flex Seated in chair 15x/5\" hold Seated in chair 15x/5\" hold *Held  *Held *Held   Wall push up **2x10   2x10       MTP/LTP RED  2x10 ea RED  3x10 ea *hold Hold *Held *Held   Wall angels             Pt issued dysum with pt educ self MFR BL scalenes/pec mm, cerv ext /rot to pt ol     Ther Activity         Postural focus with lifting from floor and table height                  Pt educ/self care 1.4.24 1.9.24 12.4.23  12.19.23 12.21.23    ReviewedPostural awareness  **Body mech  **Postural awareness  **Body mech.  *ReviewedPostural awareness  **Body mech ReviewedPostural awareness  **Body mech            Modalities 1.4.24 1.9.24 12.4.23  12.19.23 12.21.23   Cryo/MHP PRN MHP 10'   Cervical spine and UT to conclude treatment session    No adverse reaction MHP 10'   Cervical spine and UT  Post session  No adverse reaction MHP 10'   Cervical spine and UT to begin treatment session    No adverse reaction   MHP 10'   Cervical spine and UT to begin treatment session    No adverse reaction MHP 10'   Cervical spine and UT to begin treatment session    No adverse reaction MHP 10'   Cervical spine and UT to begin treatment session    No adverse reaction            Access Code: [email or web address]  URL: [email or web address]  Date: 11/20/2023  Prepared by: Ivy Lutz    Exercises  - Supine Chin Tuck  - 1 x daily - 7 x weekly - 3 sets - 10 reps  - Supine Cervical Sidebending  - 1 x daily - 7 x weekly - 1 sets - 3 reps - 30 hold  - Beginner Head Nod  - 1 x daily - 7 x weekly - 3 sets - 10 reps  - Supine Cervical Rotation AROM on Pillow  - 1 x daily - 7 x weekly - 1 sets - 3 reps - 30 hold  - Supine Scapular Retraction  - 1 x daily - 7 x weekly - 3 sets - 10 reps  - Seated Scapular Retraction  - 1 x daily - 7 x weekly - 3 sets - 10 reps                     "

## 2024-01-11 ENCOUNTER — APPOINTMENT (OUTPATIENT)
Dept: PHYSICAL THERAPY | Facility: CLINIC | Age: 44
End: 2024-01-11
Payer: COMMERCIAL

## 2024-01-16 ENCOUNTER — APPOINTMENT (OUTPATIENT)
Dept: PHYSICAL THERAPY | Facility: CLINIC | Age: 44
End: 2024-01-16
Payer: COMMERCIAL

## 2024-01-18 ENCOUNTER — EVALUATION (OUTPATIENT)
Dept: PHYSICAL THERAPY | Facility: CLINIC | Age: 44
End: 2024-01-18
Payer: COMMERCIAL

## 2024-01-18 DIAGNOSIS — M54.12 RADICULOPATHY, CERVICAL REGION: Primary | ICD-10-CM

## 2024-01-18 PROCEDURE — 97110 THERAPEUTIC EXERCISES: CPT

## 2024-01-18 PROCEDURE — 97140 MANUAL THERAPY 1/> REGIONS: CPT

## 2024-01-18 NOTE — PROGRESS NOTES
"Daily Note     Today's date: 2024  Patient name: Alejandra Cope  : 1980  MRN: [id number]  Referring provider: Lexa Koroma, PT  Dx:   Encounter Diagnosis     ICD-10-CM    1. Radiculopathy, cervical region  M54.12                      Subjective:   Pt. reports pain level = 5/10  @ L cerv region  w/rad. sx into L UE.      Objective: See treatment diary below      Assessment:  Tolerated treatment Fairly Well overall. Patient exhibited good technique with therapeutic exercises and would benefit from continued PT.  Less pain/sx noted by end of treatment session.       Plan: Pt. Has f/u appt. w/pain and spine mgmnt tomorrow.  Continue per plan of care.  Progress treatment as tolerated.               Precautions: neck pain, intermittent N/T  IE 23  RE 24    Date 1.4.24 1.9.24 1.18.24  12 12.23   Visit Count 7 8 9 4 5 6   FOTO         Pain In 0/10 6/10    4/10 w/exer 5/10 L cerv w/rad. sx into L UE 9/10 8/10 9/10   Pain Out 0/10 2/10 after MHP applic.   Less pain/sx /10 7/10 8-9/10       Manuals 1.4.24 1.9.24 1.18.24  12..23 12..23   Neck STM, cervical distraction  STM/MFR,distraction, gentle ROM to marialuisa  15 min MFR,distraction, gentle ROM to marialuisa  15 min STM/MFR,distraction, gentle ROM to marialuisa  25 min STM/MFR,distraction, gentle ROM to marialuisa  25 min STM/MFR,distraction, gentle ROM to marialuisa  25 min                              Neuro Re-Ed                                                                        Ther Ex 1.4.24 1.9.24 1.18.24  12.. 12..23    RPM  8 min alt every 2 mins 120 RPM  10 min alt every 2 mins 110 RPM  10 min alt every 2 mins held *Held *Held   Chin tuck Seated 20x Seated 20x Seated 25x   Supine  10x *Held *Held   Scapular retraction   Seated x15/3\" Seated 15x/3\" Seated 20x/3\" Review  Pt reports pain *Held *Held   Chin nod 15x Supine 15x Supine 20x Supine 10x *Held *Held   DNF         AROM cervical LF/ROT *Reviewed for HEP   5x flex, SB, Rot, sub " "max *Reviewed for HEP    BUE ER    Attempted  pain *Held *Held   Trunk and neck extension over bolster/chair with BUE flex Seated in chair 15x/5\" hold Seated in chair 15x/5\" hold Seated in chair 15x/5\" hold  *Held *Held   Wall push up **2x10   2x10 2x12      MTP/LTP RED  2x10 ea RED  3x10 ea Green   2x10 ea Hold *Held *Held   Wall angels             Pt issued dysum with pt educ self MFR BL scalenes/pec mm, cerv ext /rot to pt ol     Ther Activity         Postural focus with lifting from floor and table height                  Pt educ/self care 1.4.24 1.9.24 1.18.24  12.19.23 12.21.23    ReviewedPostural awareness  **Body mech  Reviewed Postural awareness  **Body mech  *ReviewedPostural awareness  **Body mech ReviewedPostural awareness  **Body mec            Modalities 1.4.24 1.9.24 1.18.24  12.19.23 12.21.23   Cryo/MHP PRN MHP 10'   Cervical spine and UT to conclude treatment session    No adverse reaction MHP 10'   Cervical spine and UT  Post session  No adverse reaction MHP 10'   Cervical spine and UT  Post session  No adverse reaction MHP 10'   Cervical spine and UT to begin treatment session    No adverse reaction MHP 10'   Cervical spine and UT to begin treatment session    No adverse reaction MHP 10'   Cervical spine and UT to begin treatment session    No adverse reaction            Access Code: [email or web address]  URL: [email or web address]  Date: 11/20/2023  Prepared by: Iyv Lutz    Exercises  - Supine Chin Tuck  - 1 x daily - 7 x weekly - 3 sets - 10 reps  - Supine Cervical Sidebending  - 1 x daily - 7 x weekly - 1 sets - 3 reps - 30 hold  - Beginner Head Nod  - 1 x daily - 7 x weekly - 3 sets - 10 reps  - Supine Cervical Rotation AROM on Pillow  - 1 x daily - 7 x weekly - 1 sets - 3 reps - 30 hold  - Supine Scapular Retraction  - 1 x daily - 7 x weekly - 3 sets - 10 reps  - Seated Scapular Retraction  - 1 x daily - 7 x weekly - 3 sets - 10 reps                       "

## 2024-01-25 ENCOUNTER — TELEPHONE (OUTPATIENT)
Age: 44
End: 2024-01-25

## 2024-01-26 ENCOUNTER — OFFICE VISIT (OUTPATIENT)
Dept: PAIN MEDICINE | Facility: CLINIC | Age: 44
End: 2024-01-26
Payer: COMMERCIAL

## 2024-01-26 VITALS
SYSTOLIC BLOOD PRESSURE: 129 MMHG | BODY MASS INDEX: 45.75 KG/M2 | HEIGHT: 63 IN | DIASTOLIC BLOOD PRESSURE: 87 MMHG | HEART RATE: 79 BPM | WEIGHT: 258.2 LBS

## 2024-01-26 DIAGNOSIS — M54.12 CERVICAL RADICULOPATHY: Primary | ICD-10-CM

## 2024-01-26 DIAGNOSIS — M79.2 NEUROPATHIC PAIN: ICD-10-CM

## 2024-01-26 PROCEDURE — 99214 OFFICE O/P EST MOD 30 MIN: CPT | Performed by: STUDENT IN AN ORGANIZED HEALTH CARE EDUCATION/TRAINING PROGRAM

## 2024-01-26 RX ORDER — GABAPENTIN 300 MG/1
300 CAPSULE ORAL 3 TIMES DAILY
Qty: 90 CAPSULE | Refills: 2 | Status: SHIPPED | OUTPATIENT
Start: 2024-01-26 | End: 2024-02-25

## 2024-01-26 NOTE — PROGRESS NOTES
"Assessment:  1. Cervical radiculopathy    2. Neuropathic pain        Portions of the record may have been created with voice recognition software. Occasional wrong word or \"sound a like\" substitutions may have occurred due to the inherent limitations of voice recognition software. Read the chart carefully and recognize, using context, where substitutions have occurred. Contact me with any questions.         Plan:  43-year-old female here for follow-up with regards to left-sided neck and LUE symptoms in C7 distribution.  Since last visit, patient feels that her symptoms have plateaued despite continued physical therapy/HEP.  She has also returned to full duty at work and feels that this aggravates her symptoms at times.  She denies new or progressive weakness.    Obtain cervical spine MRI for further evaluation of persistent neck and LUE symptoms.    Continue gabapentin 300 mg 3 times daily.  Refill provided today.    Continue home exercise program.    Follow-up in 3 to 4 weeks to discuss MRI results and neck steps.      History of Present Illness:  The patient is a 43 y.o. female who presents for a follow up office visit in regards to Neck Pain, Shoulder Pain, and Arm Pain.   Patient notes symptoms are mostly unchanged since last visit.  Notes she has completed physical therapy but continues home exercise program.  She has been taking gabapentin 300 mg 3 times daily with some relief in symptoms, denies side effects.    I have personally reviewed and/or updated the patient's past medical history, past surgical history, family history, social history, current medications, allergies, and vital signs today.       Review of Systems  Review of Systems   Constitutional:  Negative for unexpected weight change.   HENT:  Negative for hearing loss.    Eyes:  Negative for visual disturbance.   Respiratory:  Negative for shortness of breath.    Cardiovascular:  Negative for leg swelling.   Gastrointestinal:  Negative for " constipation.   Endocrine: Negative for polyuria.   Genitourinary:  Negative for difficulty urinating.   Musculoskeletal:  Positive for arthralgias and myalgias. Negative for gait problem and joint swelling.        Decreased range of motion  Joint stiffness   Skin:  Negative for rash.   Neurological:  Negative for weakness and headaches.   Psychiatric/Behavioral:  Negative for decreased concentration.    All other systems reviewed and are negative.        Past Medical History:   Diagnosis Date    Anxiety     Asthma     Class 3 severe obesity due to excess calories without serious comorbidity with body mass index (BMI) of 40.0 to 44.9 in adult ([facility]) 2022    Complete spontaneous  without complication     FIVE TIMES    Concussion     Depression     Headache     Hypertension     Insomnia     Normal delivery     DAUGHTER    Normal delivery     SON    Pelvic inflammatory disease     RESOLVED: 18    Post concussion syndrome 2019    Recurrent pregnancy loss (CODE)     Thyroid nodule 2019       Past Surgical History:   Procedure Laterality Date    CHOLECYSTECTOMY      CYSTOSCOPY N/A 2016    Procedure: CYSTOSCOPY;  Surgeon: Kamryn Bloom MD;  Location: [facility];  Service:     HYSTERECTOMY      LAPAROSCOPIC CHOLECYSTECTOMY      RESOLVED:     NASAL SEPTUM SURGERY      REESOLVED: ; DEVIATION    ME LAPAROSCOPY W TOTAL HYSTERECTOMY UTERUS 250 GM/< N/A 2016    Procedure: HYSTERECTOMY LAPAROSCOPIC TOTAL ;  Surgeon: Kamryn Bloom MD;  Location: [facility];  Service: Gynecology    SALPINGECTOMY Bilateral 2016    Procedure: SALPINGECTOMY;  Surgeon: Kamryn Bloom MD;  Location: [facility];  Service:     TONSILLECTOMY      RESOLVED:     TUBAL LIGATION      RESOLVED: 16       Family History   Problem Relation Age of Onset    Venous thrombosis Mother         ACUTE; OF DEEP VESSELS OF THE DISTAL LOWER EXTREMITY    COPD Mother     Heart disease Mother     Hypertension Mother     Mental  illness Mother     Colon cancer Father 59    No Known Problems Daughter     Kidney disease Maternal Grandmother     No Known Problems Maternal Grandfather     Breast cancer Paternal Grandmother 40    Diabetes Paternal Grandfather         MELLITUS    No Known Problems Son     No Known Problems Maternal Aunt     No Known Problems Maternal Aunt        Social History     Occupational History    Occupation: employed   Tobacco Use    Smoking status: Former     Types: Cigars    Smokeless tobacco: Never   Vaping Use    Vaping status: Never Used   Substance and Sexual Activity    Alcohol use: Yes     Comment: socially    Drug use: No    Sexual activity: Yes     Partners: Male     Birth control/protection: None         Current Outpatient Medications:     albuterol (PROVENTIL HFA,VENTOLIN HFA) 90 mcg/act inhaler, INHALE 2 PUFFS EVERY 6 HOURS AS NEEDED FOR WHEEZING, Disp: 18 g, Rfl: 2    amitriptyline (ELAVIL) 25 mg tablet, TAKE 1 TABLET BY MOUTH DAILY AT BEDTIME, Disp: 90 tablet, Rfl: 1    buPROPion (WELLBUTRIN XL) 150 mg 24 hr tablet, Take 1 tablet (150 mg total) by mouth every morning, Disp: 30 tablet, Rfl: 2    famotidine (PEPCID) 40 MG tablet, TAKE 1 TABLET BY MOUTH DAILY AT BEDTIME, Disp: 90 tablet, Rfl: 1    gabapentin (NEURONTIN) 300 mg capsule, Take 1 capsule (300 mg total) by mouth 3 (three) times a day, Disp: 90 capsule, Rfl: 2    lisinopril (ZESTRIL) 30 mg tablet, TAKE 1 TABLET BY MOUTH EVERY DAY, Disp: 90 tablet, Rfl: 1    methocarbamol (ROBAXIN) 500 mg tablet, Take 1 tablet up to three times a day as needed, Disp: 90 tablet, Rfl: 0    omeprazole (PriLOSEC) 20 mg delayed release capsule, TAKE 1 CAPSULE DAILY, Disp: 30 capsule, Rfl: 1    hydrocortisone-pramoxine (ANALPRAM-HC) 2.5-1 % rectal cream, Apply topically 2 (two) times a day for 7 days Apply to rectum/ anus. (Patient not taking: Reported on 11/14/2023), Disp: 4 g, Rfl: 1    methylPREDNISolone 4 MG tablet therapy pack, Use as directed on package (Patient not  "taking: Reported on 1/26/2024), Disp: 1 each, Rfl: 0    predniSONE 10 mg tablet, Take 3 tabs BID X 2 days, 2 tabs BID X 2 days, 1 tab BID X 2 days, 1 tab daily X 2 days (Patient not taking: Reported on 1/26/2024), Disp: 26 tablet, Rfl: 0    traZODone (DESYREL) 50 mg tablet, take 1 tablet by mouth at bedtime (Patient not taking: Reported on 11/14/2023), Disp: 30 tablet, Rfl: 0    triamcinolone (KENALOG) 0.5 % cream, Apply topically 2 (two) times a day (Patient not taking: Reported on 11/14/2023), Disp: 15 g, Rfl: 0  No current facility-administered medications for this visit.    Facility-Administered Medications Ordered in Other Visits:     hydrocortisone (ANUSOL-HC) 2.5 % rectal cream, , Topical, 4x Daily PRN, Jacob Casper MD    No Known Allergies    Physical Exam:    /87   Pulse 79   Ht 5' 3\" (1.6 m)   Wt 117 kg (258 lb 3.2 oz)   LMP 12/01/2016   BMI 45.74 kg/m²     Constitutional:normal, well developed, well nourished, alert, in no distress and non-toxic and no overt pain behavior.  Eyes:anicteric  HEENT:grossly intact  Neck:supple, symmetric, trachea midline and no masses   Pulmonary:even and unlabored  Cardiovascular:No edema or pitting edema present  Skin:Normal without rashes or lesions and well hydrated  Psychiatric:Mood and affect appropriate  Neurologic:Cranial Nerves II-XII grossly intact  Musculoskeletal:normal gait    Imaging  MRI cervical spine without contrast    (Results Pending)     No new relevant imaging available for review.    Orders Placed This Encounter   Procedures    MRI cervical spine without contrast         "

## 2024-01-31 DIAGNOSIS — K21.00 GASTROESOPHAGEAL REFLUX DISEASE WITH ESOPHAGITIS: ICD-10-CM

## 2024-01-31 DIAGNOSIS — I10 ESSENTIAL HYPERTENSION: ICD-10-CM

## 2024-01-31 RX ORDER — OMEPRAZOLE 20 MG/1
20 CAPSULE, DELAYED RELEASE ORAL DAILY
Qty: 30 CAPSULE | Refills: 2 | Status: SHIPPED | OUTPATIENT
Start: 2024-01-31

## 2024-01-31 RX ORDER — LISINOPRIL 30 MG/1
30 TABLET ORAL DAILY
Qty: 90 TABLET | Refills: 1 | Status: SHIPPED | OUTPATIENT
Start: 2024-01-31

## 2024-02-01 ENCOUNTER — OFFICE VISIT (OUTPATIENT)
Dept: INTERNAL MEDICINE CLINIC | Facility: CLINIC | Age: 44
End: 2024-02-01
Payer: COMMERCIAL

## 2024-02-01 ENCOUNTER — LAB (OUTPATIENT)
Dept: LAB | Facility: CLINIC | Age: 44
End: 2024-02-01
Payer: COMMERCIAL

## 2024-02-01 VITALS
BODY MASS INDEX: 43.22 KG/M2 | HEART RATE: 76 BPM | OXYGEN SATURATION: 99 % | HEIGHT: 65 IN | TEMPERATURE: 97 F | SYSTOLIC BLOOD PRESSURE: 128 MMHG | DIASTOLIC BLOOD PRESSURE: 84 MMHG | WEIGHT: 259.4 LBS

## 2024-02-01 DIAGNOSIS — R53.82 CHRONIC FATIGUE: ICD-10-CM

## 2024-02-01 DIAGNOSIS — Z13.0 SCREENING FOR DEFICIENCY ANEMIA: ICD-10-CM

## 2024-02-01 DIAGNOSIS — Z11.4 SCREENING FOR HIV (HUMAN IMMUNODEFICIENCY VIRUS): ICD-10-CM

## 2024-02-01 DIAGNOSIS — Z13.220 SCREENING, LIPID: ICD-10-CM

## 2024-02-01 DIAGNOSIS — Z13.29 SCREENING FOR THYROID DISORDER: ICD-10-CM

## 2024-02-01 DIAGNOSIS — E55.9 VITAMIN D DEFICIENCY: ICD-10-CM

## 2024-02-01 DIAGNOSIS — Z23 ENCOUNTER FOR IMMUNIZATION: ICD-10-CM

## 2024-02-01 DIAGNOSIS — Z13.1 SCREENING FOR DIABETES MELLITUS: ICD-10-CM

## 2024-02-01 DIAGNOSIS — Z11.59 NEED FOR HEPATITIS C SCREENING TEST: ICD-10-CM

## 2024-02-01 DIAGNOSIS — E66.01 CLASS 3 SEVERE OBESITY DUE TO EXCESS CALORIES WITHOUT SERIOUS COMORBIDITY WITH BODY MASS INDEX (BMI) OF 40.0 TO 44.9 IN ADULT (HCC): Primary | ICD-10-CM

## 2024-02-01 LAB
25(OH)D3 SERPL-MCNC: 44.2 NG/ML (ref 30–100)
ALBUMIN SERPL BCP-MCNC: 3.8 G/DL (ref 3.5–5)
ALP SERPL-CCNC: 55 U/L (ref 34–104)
ALT SERPL W P-5'-P-CCNC: 72 U/L (ref 7–52)
ANION GAP SERPL CALCULATED.3IONS-SCNC: 7 MMOL/L
AST SERPL W P-5'-P-CCNC: 43 U/L (ref 13–39)
BASOPHILS # BLD AUTO: 0.06 THOUSANDS/ÂΜL (ref 0–0.1)
BASOPHILS NFR BLD AUTO: 1 % (ref 0–1)
BILIRUB SERPL-MCNC: 0.47 MG/DL (ref 0.2–1)
BUN SERPL-MCNC: 14 MG/DL (ref 5–25)
CALCIUM SERPL-MCNC: 9.2 MG/DL (ref 8.4–10.2)
CHLORIDE SERPL-SCNC: 104 MMOL/L (ref 96–108)
CHOLEST SERPL-MCNC: 176 MG/DL
CO2 SERPL-SCNC: 29 MMOL/L (ref 21–32)
CREAT SERPL-MCNC: 0.92 MG/DL (ref 0.6–1.3)
EOSINOPHIL # BLD AUTO: 0.12 THOUSAND/ÂΜL (ref 0–0.61)
EOSINOPHIL NFR BLD AUTO: 2 % (ref 0–6)
ERYTHROCYTE [DISTWIDTH] IN BLOOD BY AUTOMATED COUNT: 12.4 % (ref 11.6–15.1)
FERRITIN SERPL-MCNC: 340 NG/ML (ref 11–307)
GFR SERPL CREATININE-BSD FRML MDRD: 77 ML/MIN/1.73SQ M
GLUCOSE P FAST SERPL-MCNC: 82 MG/DL (ref 65–99)
HCT VFR BLD AUTO: 42 % (ref 34.8–46.1)
HDLC SERPL-MCNC: 64 MG/DL
HGB BLD-MCNC: 14.1 G/DL (ref 11.5–15.4)
IMM GRANULOCYTES # BLD AUTO: 0.03 THOUSAND/UL (ref 0–0.2)
IMM GRANULOCYTES NFR BLD AUTO: 0 % (ref 0–2)
IRON SATN MFR SERPL: 38 % (ref 15–50)
IRON SERPL-MCNC: 129 UG/DL (ref 50–212)
LDLC SERPL CALC-MCNC: 76 MG/DL (ref 0–100)
LYMPHOCYTES # BLD AUTO: 2.5 THOUSANDS/ÂΜL (ref 0.6–4.47)
LYMPHOCYTES NFR BLD AUTO: 32 % (ref 14–44)
MCH RBC QN AUTO: 32 PG (ref 26.8–34.3)
MCHC RBC AUTO-ENTMCNC: 33.6 G/DL (ref 31.4–37.4)
MCV RBC AUTO: 95 FL (ref 82–98)
MONOCYTES # BLD AUTO: 0.58 THOUSAND/ÂΜL (ref 0.17–1.22)
MONOCYTES NFR BLD AUTO: 8 % (ref 4–12)
NEUTROPHILS # BLD AUTO: 4.46 THOUSANDS/ÂΜL (ref 1.85–7.62)
NEUTS SEG NFR BLD AUTO: 57 % (ref 43–75)
NONHDLC SERPL-MCNC: 112 MG/DL
NRBC BLD AUTO-RTO: 0 /100 WBCS
PLATELET # BLD AUTO: 343 THOUSANDS/UL (ref 149–390)
PMV BLD AUTO: 10.3 FL (ref 8.9–12.7)
POTASSIUM SERPL-SCNC: 3.8 MMOL/L (ref 3.5–5.3)
PROT SERPL-MCNC: 6.6 G/DL (ref 6.4–8.4)
RBC # BLD AUTO: 4.41 MILLION/UL (ref 3.81–5.12)
SODIUM SERPL-SCNC: 140 MMOL/L (ref 135–147)
TIBC SERPL-MCNC: 342 UG/DL (ref 250–450)
TRIGL SERPL-MCNC: 182 MG/DL
TSH SERPL DL<=0.05 MIU/L-ACNC: 1.57 UIU/ML (ref 0.45–4.5)
UIBC SERPL-MCNC: 213 UG/DL (ref 155–355)
VIT B12 SERPL-MCNC: 377 PG/ML (ref 180–914)
WBC # BLD AUTO: 7.75 THOUSAND/UL (ref 4.31–10.16)

## 2024-02-01 PROCEDURE — 82306 VITAMIN D 25 HYDROXY: CPT

## 2024-02-01 PROCEDURE — 80053 COMPREHEN METABOLIC PANEL: CPT

## 2024-02-01 PROCEDURE — 80061 LIPID PANEL: CPT

## 2024-02-01 PROCEDURE — 84443 ASSAY THYROID STIM HORMONE: CPT

## 2024-02-01 PROCEDURE — 82728 ASSAY OF FERRITIN: CPT

## 2024-02-01 PROCEDURE — 85025 COMPLETE CBC W/AUTO DIFF WBC: CPT

## 2024-02-01 PROCEDURE — 82607 VITAMIN B-12: CPT

## 2024-02-01 PROCEDURE — 86803 HEPATITIS C AB TEST: CPT

## 2024-02-01 PROCEDURE — 36415 COLL VENOUS BLD VENIPUNCTURE: CPT

## 2024-02-01 PROCEDURE — 99213 OFFICE O/P EST LOW 20 MIN: CPT | Performed by: PHYSICIAN ASSISTANT

## 2024-02-01 PROCEDURE — 83540 ASSAY OF IRON: CPT

## 2024-02-01 PROCEDURE — 83550 IRON BINDING TEST: CPT

## 2024-02-01 PROCEDURE — 87389 HIV-1 AG W/HIV-1&-2 AB AG IA: CPT

## 2024-02-01 RX ORDER — PHENTERMINE HYDROCHLORIDE 37.5 MG/1
37.5 CAPSULE ORAL EVERY MORNING
Qty: 30 CAPSULE | Refills: 2 | Status: SHIPPED | OUTPATIENT
Start: 2024-02-01

## 2024-02-02 LAB
HCV AB SER QL: NORMAL
HIV 1+2 AB+HIV1 P24 AG SERPL QL IA: NORMAL
HIV 2 AB SERPL QL IA: NORMAL
HIV1 AB SERPL QL IA: NORMAL
HIV1 P24 AG SERPL QL IA: NORMAL

## 2024-02-02 NOTE — RESULT ENCOUNTER NOTE
Eamon lemon  I reviewed your labs and all looked great. Your liver enzymes were very mildly increased, id recommend just repeating in a few months. Have a great weekend  -Reba

## 2024-02-02 NOTE — PROGRESS NOTES
Name: Alejandra Cope      : 1980      MRN: [id number]  Encounter Provider: Reba Mckinley PA-C  Encounter Date: 2024   Encounter department: [facility]    Assessment & Plan     1. Class 3 severe obesity due to excess calories without serious comorbidity with body mass index (BMI) of 40.0 to 44.9 in adult (HCC)  Assessment & Plan:  Will start phentermine. Pt counseled on short term use of this medication of up to 6 months. Directions for use and possible side effects discussed and patient verbalized understanding of these.      Orders:  -     phentermine 37.5 MG capsule; Take 1 capsule (37.5 mg total) by mouth every morning    2. Need for hepatitis C screening test  -     Hepatitis C Antibody; Future    3. Encounter for immunization  -     Pneumococcal Conjugate Vaccine 20-valent (PCV20)  -     influenza vaccine, quadrivalent, 0.5 mL, preservative-free, for adult and pediatric patients 6 mos+ (AFLURIA, FLUARIX, FLULAVAL, FLUZONE)    4. Screening for HIV (human immunodeficiency virus)  -     HIV 1/2 AG/AB w Reflex [facility] for 2 yr old and above; Future    5. Chronic fatigue  -     Iron Panel (Includes Ferritin, Iron Sat%, Iron, and TIBC); Future  -     Vitamin B12; Future    6. Vitamin D deficiency  -     Vitamin D 25 hydroxy; Future    7. Screening, lipid  -     Lipid panel; Future    8. Screening for thyroid disorder  -     TSH, 3rd generation; Future    9. Screening for deficiency anemia  -     CBC and differential; Future    10. Screening for diabetes mellitus  -     Comprehensive metabolic panel; Future           Subjective      Pt presents for routine visit. She is up to date with mammogram. She is due for labs. BP is controlled. She notes frustration with her weight. BMI is 43. She has tried wellbutrin without success.       Review of Systems   Constitutional:  Negative for chills and fever.   HENT:  Negative for congestion, ear pain, hearing loss, postnasal drip, rhinorrhea,  "sinus pressure, sinus pain, sore throat and trouble swallowing.    Eyes:  Negative for pain and visual disturbance.   Respiratory:  Negative for cough, chest tightness, shortness of breath and wheezing.    Cardiovascular: Negative.  Negative for chest pain, palpitations and leg swelling.   Gastrointestinal:  Negative for abdominal pain, blood in stool, constipation, diarrhea, nausea and vomiting.   Endocrine: Negative for cold intolerance, heat intolerance, polydipsia, polyphagia and polyuria.   Genitourinary:  Negative for difficulty urinating, dysuria, flank pain and urgency.   Musculoskeletal:  Negative for arthralgias, back pain, gait problem and myalgias.   Skin:  Negative for rash.   Allergic/Immunologic: Negative.    Neurological:  Negative for dizziness, weakness, light-headedness and headaches.   Hematological: Negative.    Psychiatric/Behavioral:  Negative for behavioral problems, dysphoric mood and sleep disturbance. The patient is not nervous/anxious.        Current Outpatient Medications on File Prior to Visit   Medication Sig    albuterol (PROVENTIL HFA,VENTOLIN HFA) 90 mcg/act inhaler INHALE 2 PUFFS EVERY 6 HOURS AS NEEDED FOR WHEEZING    amitriptyline (ELAVIL) 25 mg tablet TAKE 1 TABLET BY MOUTH DAILY AT BEDTIME    buPROPion (WELLBUTRIN XL) 150 mg 24 hr tablet Take 1 tablet (150 mg total) by mouth every morning    famotidine (PEPCID) 40 MG tablet TAKE 1 TABLET BY MOUTH DAILY AT BEDTIME    gabapentin (NEURONTIN) 300 mg capsule Take 1 capsule (300 mg total) by mouth 3 (three) times a day    lisinopril (ZESTRIL) 30 mg tablet Take 1 tablet (30 mg total) by mouth daily    methocarbamol (ROBAXIN) 500 mg tablet Take 1 tablet up to three times a day as needed    omeprazole (PriLOSEC) 20 mg delayed release capsule Take 1 capsule (20 mg total) by mouth daily       Objective     /84   Pulse 76   Temp (!) 97 °F (36.1 °C)   Ht 5' 4.5\" (1.638 m)   Wt 118 kg (259 lb 6.4 oz)   LMP 12/01/2016   SpO2 99%  "  BMI 43.84 kg/m²     Physical Exam  Vitals and nursing note reviewed.   Constitutional:       General: She is not in acute distress.     Appearance: Normal appearance. She is well-developed. She is obese. She is not diaphoretic.   HENT:      Head: Normocephalic and atraumatic.      Right Ear: External ear normal.      Left Ear: External ear normal.      Nose: Nose normal.      Mouth/Throat:      Pharynx: No oropharyngeal exudate.   Eyes:      General: No scleral icterus.        Right eye: No discharge.         Left eye: No discharge.      Conjunctiva/sclera: Conjunctivae normal.      Pupils: Pupils are equal, round, and reactive to light.   Neck:      Thyroid: No thyromegaly.   Cardiovascular:      Rate and Rhythm: Normal rate and regular rhythm.      Heart sounds: Normal heart sounds. No murmur heard.     No friction rub. No gallop.   Pulmonary:      Effort: Pulmonary effort is normal. No respiratory distress.      Breath sounds: Normal breath sounds. No wheezing or rales.   Abdominal:      General: Bowel sounds are normal. There is no distension.      Palpations: Abdomen is soft.      Tenderness: There is no abdominal tenderness.   Musculoskeletal:         General: No tenderness or deformity. Normal range of motion.      Cervical back: Normal range of motion and neck supple.   Skin:     General: Skin is warm and dry.   Neurological:      Mental Status: She is alert and oriented to person, place, and time.      Cranial Nerves: No cranial nerve deficit.   Psychiatric:         Behavior: Behavior normal.         Thought Content: Thought content normal.         Judgment: Judgment normal.       Reba Mckinley PA-C

## 2024-02-02 NOTE — ASSESSMENT & PLAN NOTE
Will start phentermine. Pt counseled on short term use of this medication of up to 6 months. Directions for use and possible side effects discussed and patient verbalized understanding of these.

## 2024-02-10 ENCOUNTER — HOSPITAL ENCOUNTER (OUTPATIENT)
Dept: MRI IMAGING | Facility: HOSPITAL | Age: 44
Discharge: HOME | End: 2024-02-10
Attending: STUDENT IN AN ORGANIZED HEALTH CARE EDUCATION/TRAINING PROGRAM
Payer: COMMERCIAL

## 2024-02-10 DIAGNOSIS — M54.12 CERVICAL RADICULOPATHY: ICD-10-CM

## 2024-02-10 PROCEDURE — G1004 CDSM NDSC: HCPCS

## 2024-02-10 PROCEDURE — 72141 MRI NECK SPINE W/O DYE: CPT

## 2024-02-16 ENCOUNTER — TELEPHONE (OUTPATIENT)
Dept: PAIN MEDICINE | Facility: MEDICAL CENTER | Age: 44
End: 2024-02-16

## 2024-02-16 NOTE — TELEPHONE ENCOUNTER
----- Message from Ceci Lee MD sent at 2/16/2024 12:34 PM EST -----  Multilevel DDD with variable canal and foraminal stenosis. Will discuss next steps at scheduled follow up next week.

## 2024-02-16 NOTE — TELEPHONE ENCOUNTER
RN attempted to reach pt regarding previous. [facility]MLOM with c/b number office hours and location provided.

## 2024-02-20 NOTE — TELEPHONE ENCOUNTER
Caller: Patient     Doctor/Office: Jesus     Call regarding :  Returning nurse call      Call was transferred to: Triage nurse

## 2024-02-21 PROBLEM — Z00.00 WELL ADULT EXAM: Status: RESOLVED | Noted: 2018-02-17 | Resolved: 2024-02-21

## 2024-02-23 ENCOUNTER — OFFICE VISIT (OUTPATIENT)
Dept: PAIN MEDICINE | Facility: CLINIC | Age: 44
End: 2024-02-23
Payer: COMMERCIAL

## 2024-02-23 VITALS
HEIGHT: 65 IN | HEART RATE: 87 BPM | BODY MASS INDEX: 43.15 KG/M2 | DIASTOLIC BLOOD PRESSURE: 82 MMHG | SYSTOLIC BLOOD PRESSURE: 135 MMHG | WEIGHT: 259 LBS

## 2024-02-23 DIAGNOSIS — M50.30 DDD (DEGENERATIVE DISC DISEASE), CERVICAL: ICD-10-CM

## 2024-02-23 DIAGNOSIS — M54.12 CERVICAL RADICULOPATHY: Primary | ICD-10-CM

## 2024-02-23 PROCEDURE — 99214 OFFICE O/P EST MOD 30 MIN: CPT | Performed by: STUDENT IN AN ORGANIZED HEALTH CARE EDUCATION/TRAINING PROGRAM

## 2024-02-23 NOTE — PROGRESS NOTES
"Assessment:  1. Cervical radiculopathy    2. DDD (degenerative disc disease), cervical        Portions of the record may have been created with voice recognition software. Occasional wrong word or \"sound a like\" substitutions may have occurred due to the inherent limitations of voice recognition software. Read the chart carefully and recognize, using context, where substitutions have occurred. Contact me with any questions.       Plan:  43-year-old female here for follow-up visit.  Since last visit, patient underwent cervical spine MRI which shows multilevel DDD with variable canal and foraminal stenosis.  Patient notes persistent left-sided neck and LUE symptoms.  She remains neurologically stable, denies new or progressive weakness, balance/gait issues.  She continues to take gabapentin 300 mg 3 times daily which is providing some relief with symptoms.  She notes residual symptoms do impact function.    Will schedule for interlaminar cervical epidural steroid injection C7-T1.    Referral to neurosurgery provided for discussion of possible surgical options.    Follow-up in 1 month after injection.      Complete risks and benefits including bleeding, infection, tissue reaction, nerve injury and allergic reaction were discussed. The approach was demonstrated using models and literature was provided. Verbal and written consent was obtained.        History of Present Illness:  The patient is a 43 y.o. female who presents for a follow up office visit in regards to Neck Pain and Shoulder Pain.     Current pain medications includes:  gabapentin 300 mg tid.  The patient reports that this regimen is providing some pain relief.  The patient is reporting no side effects from this pain medication regimen.    I have personally reviewed and/or updated the patient's past medical history, past surgical history, family history, social history, current medications, allergies, and vital signs today.       Review of Systems  Review of " Systems   Constitutional:  Negative for unexpected weight change.   HENT:  Negative for hearing loss.    Eyes:  Negative for visual disturbance.   Respiratory:  Negative for shortness of breath.    Cardiovascular:  Negative for leg swelling.   Gastrointestinal:  Negative for constipation.   Endocrine: Negative for polyuria.   Genitourinary:  Negative for difficulty urinating.   Musculoskeletal:  Negative for gait problem, joint swelling and myalgias.        Decreased range of motion  Joint stiffness   Skin:  Negative for rash.   Neurological:  Negative for weakness and headaches.   Psychiatric/Behavioral:  Negative for decreased concentration.    All other systems reviewed and are negative.        Past Medical History:   Diagnosis Date    Anxiety     Asthma     Class 3 severe obesity due to excess calories without serious comorbidity with body mass index (BMI) of 40.0 to 44.9 in adult ([facility]) 2022    Complete spontaneous  without complication     FIVE TIMES    Concussion     Depression     Headache     Hypertension     Insomnia     Normal delivery     DAUGHTER    Normal delivery     SON    Pelvic inflammatory disease     RESOLVED: 18    Post concussion syndrome 2019    Recurrent pregnancy loss (CODE)     Thyroid nodule 2019       Past Surgical History:   Procedure Laterality Date    CHOLECYSTECTOMY      CYSTOSCOPY N/A 2016    Procedure: CYSTOSCOPY;  Surgeon: Kamryn Bloom MD;  Location: [facility];  Service:     HYSTERECTOMY      LAPAROSCOPIC CHOLECYSTECTOMY      RESOLVED:     NASAL SEPTUM SURGERY      REESOLVED: ; DEVIATION    [place] LAPAROSCOPY W TOTAL HYSTERECTOMY UTERUS 250 GM/< N/A 2016    Procedure: HYSTERECTOMY LAPAROSCOPIC TOTAL ;  Surgeon: Kamryn Bloom MD;  Location: [facility];  Service: Gynecology    SALPINGECTOMY Bilateral 2016    Procedure: SALPINGECTOMY;  Surgeon: Kamryn Bloom MD;  Location: [facility];  Service:     TONSILLECTOMY      RESOLVED:     TUBAL  LIGATION      RESOLVED: 1/22/14       Family History   Problem Relation Age of Onset    Venous thrombosis Mother         ACUTE; OF DEEP VESSELS OF THE DISTAL LOWER EXTREMITY    COPD Mother     Heart disease Mother     Hypertension Mother     Mental illness Mother     Anxiety disorder Mother     OCD Mother     Colon cancer Father 59    Alcohol abuse Father     No Known Problems Daughter     Kidney disease Maternal Grandmother     No Known Problems Maternal Grandfather     Breast cancer Paternal Grandmother 40    Diabetes Paternal Grandfather         MELLITUS    No Known Problems Son     No Known Problems Maternal Aunt     No Known Problems Maternal Aunt        Social History     Occupational History    Occupation: employed   Tobacco Use    Smoking status: Former     Types: Cigars     Passive exposure: Current    Smokeless tobacco: Never   Vaping Use    Vaping status: Never Used   Substance and Sexual Activity    Alcohol use: Yes     Comment: socially    Drug use: No    Sexual activity: Yes     Partners: Male     Birth control/protection: None         Current Outpatient Medications:     albuterol (PROVENTIL HFA,VENTOLIN HFA) 90 mcg/act inhaler, INHALE 2 PUFFS EVERY 6 HOURS AS NEEDED FOR WHEEZING, Disp: 18 g, Rfl: 2    amitriptyline (ELAVIL) 25 mg tablet, TAKE 1 TABLET BY MOUTH DAILY AT BEDTIME, Disp: 90 tablet, Rfl: 1    buPROPion (WELLBUTRIN XL) 150 mg 24 hr tablet, Take 1 tablet (150 mg total) by mouth every morning, Disp: 30 tablet, Rfl: 2    famotidine (PEPCID) 40 MG tablet, TAKE 1 TABLET BY MOUTH DAILY AT BEDTIME, Disp: 90 tablet, Rfl: 1    gabapentin (NEURONTIN) 300 mg capsule, Take 1 capsule (300 mg total) by mouth 3 (three) times a day, Disp: 90 capsule, Rfl: 2    lisinopril (ZESTRIL) 30 mg tablet, Take 1 tablet (30 mg total) by mouth daily, Disp: 90 tablet, Rfl: 1    methocarbamol (ROBAXIN) 500 mg tablet, Take 1 tablet up to three times a day as needed, Disp: 90 tablet, Rfl: 0    omeprazole (PriLOSEC) 20 mg  "delayed release capsule, Take 1 capsule (20 mg total) by mouth daily, Disp: 30 capsule, Rfl: 2    phentermine 37.5 MG capsule, Take 1 capsule (37.5 mg total) by mouth every morning, Disp: 30 capsule, Rfl: 2  No current facility-administered medications for this visit.    Facility-Administered Medications Ordered in Other Visits:     hydrocortisone (ANUSOL-HC) 2.5 % rectal cream, , Topical, 4x Daily PRN, Jacob Casper MD    No Known Allergies    Physical Exam:    /82   Pulse 87   Ht 5' 4.5\" (1.638 m)   Wt 117 kg (259 lb)   LMP 12/01/2016   BMI 43.77 kg/m²     Constitutional:normal, well developed, well nourished, alert, in no distress and non-toxic and no overt pain behavior.  Eyes:anicteric  HEENT:grossly intact  Neck:supple, symmetric, trachea midline and no masses   Pulmonary:even and unlabored  Cardiovascular:No edema or pitting edema present  Skin:Normal without rashes or lesions and well hydrated  Psychiatric:Mood and affect appropriate  Neurologic:Cranial Nerves II-XII grossly intact  Musculoskeletal:normal gait, negative Garrett's    Imaging    MRI CERVICAL SPINE WITHOUT CONTRAST     INDICATION: M54.12: Radiculopathy, cervical region.     COMPARISON: Plain film 11/14/2023     TECHNIQUE:  Multiplanar, multisequence imaging of the cervical spine was performed. .        IMAGE QUALITY:  Diagnostic     FINDINGS:     ALIGNMENT:  Normal alignment of the cervical spine.  No compression fracture.  No subluxation.  No scoliosis.     MARROW SIGNAL:  Normal marrow signal is identified within the visualized bony structures.  No discrete marrow lesion.     CERVICAL AND VISUALIZED THORACIC CORD:  Normal signal within the visualized cord.     PREVERTEBRAL AND PARASPINAL SOFT TISSUES:  Normal.     VISUALIZED POSTERIOR FOSSA:  The visualized posterior fossa demonstrates no abnormal signal.     CERVICAL DISC SPACES:     C2-C3: Tiny central disc protrusion without stenosis.     C3-C4: Right paracentral disc protrusion " without stenosis.     C4-C5: Right paracentral disc osteophyte complex resulting in slight cord indentation without myelomalacia. Facet hypertrophy noted bilaterally. Mild bilateral foraminal stenosis at this level.     C5-C6: Disc osteophyte complex with left greater than right uncovertebral hypertrophy. There is slight ventral cord indentation without myelomalacia. Facet arthrosis noted. There is moderate left and mild right foraminal narrowing. Correlate clinically   for left greater than right C6 radiculopathy.     C6-C7: Right paracentral disc protrusion resulting in slight thecal sac indentation. Foramina remain patent at this level.     C7-T1:  Normal.     UPPER THORACIC DISC SPACES:  Normal.     OTHER FINDINGS:  None.     IMPRESSION:  Mild to moderate spondylotic changes of the cervical spine greater than expected for patient age. Recommend surgical consultation.     The study was marked in EPIC for significant notification.

## 2024-02-24 DIAGNOSIS — K21.00 GASTROESOPHAGEAL REFLUX DISEASE WITH ESOPHAGITIS: ICD-10-CM

## 2024-02-26 RX ORDER — OMEPRAZOLE 20 MG/1
20 CAPSULE, DELAYED RELEASE ORAL DAILY
Qty: 90 CAPSULE | Refills: 1 | Status: SHIPPED | OUTPATIENT
Start: 2024-02-26

## 2024-03-03 DIAGNOSIS — M54.12 RADICULOPATHY, CERVICAL REGION: ICD-10-CM

## 2024-03-04 ENCOUNTER — HOSPITAL ENCOUNTER (OUTPATIENT)
Dept: RADIOLOGY | Facility: HOSPITAL | Age: 44
Discharge: HOME | End: 2024-03-04
Attending: STUDENT IN AN ORGANIZED HEALTH CARE EDUCATION/TRAINING PROGRAM
Payer: COMMERCIAL

## 2024-03-04 VITALS
RESPIRATION RATE: 20 BRPM | TEMPERATURE: 97.6 F | DIASTOLIC BLOOD PRESSURE: 85 MMHG | OXYGEN SATURATION: 97 % | SYSTOLIC BLOOD PRESSURE: 138 MMHG | HEART RATE: 77 BPM

## 2024-03-04 DIAGNOSIS — M54.12 CERVICAL RADICULOPATHY: ICD-10-CM

## 2024-03-04 PROCEDURE — 62321 NJX INTERLAMINAR CRV/THRC: CPT | Performed by: STUDENT IN AN ORGANIZED HEALTH CARE EDUCATION/TRAINING PROGRAM

## 2024-03-04 RX ORDER — METHOCARBAMOL 500 MG/1
TABLET, FILM COATED ORAL
Qty: 90 TABLET | Refills: 0 | Status: SHIPPED | OUTPATIENT
Start: 2024-03-04

## 2024-03-04 RX ORDER — 0.9 % SODIUM CHLORIDE 0.9 %
0.5 VIAL (ML) INJECTION ONCE
Status: COMPLETED | OUTPATIENT
Start: 2024-03-04 | End: 2024-03-04

## 2024-03-04 RX ORDER — LIDOCAINE HYDROCHLORIDE 10 MG/ML
3 INJECTION, SOLUTION EPIDURAL; INFILTRATION; INTRACAUDAL; PERINEURAL ONCE
Status: COMPLETED | OUTPATIENT
Start: 2024-03-04 | End: 2024-03-04

## 2024-03-04 RX ORDER — PAPAVERINE HCL 150 MG
5 CAPSULE, EXTENDED RELEASE ORAL ONCE
Status: COMPLETED | OUTPATIENT
Start: 2024-03-04 | End: 2024-03-04

## 2024-03-04 RX ADMIN — IOHEXOL 0.5 ML: 300 INJECTION, SOLUTION INTRAVENOUS at 11:42

## 2024-03-04 RX ADMIN — LIDOCAINE HYDROCHLORIDE 3 ML: 10 INJECTION, SOLUTION EPIDURAL; INFILTRATION; INTRACAUDAL; PERINEURAL at 11:39

## 2024-03-04 RX ADMIN — Medication 5 MG: at 11:43

## 2024-03-04 RX ADMIN — Medication 0.5 ML: at 11:39

## 2024-03-04 NOTE — H&P
History of Present Illness: The patient is a 43 y.o. female who presents with complaints of neck pain.    Past Medical History:   Diagnosis Date    Anxiety     Asthma     Class 3 severe obesity due to excess calories without serious comorbidity with body mass index (BMI) of 40.0 to 44.9 in adult ([facility]) 2022    Complete spontaneous  without complication     FIVE TIMES    Concussion     Depression     Headache     Hypertension     Insomnia     Normal delivery     DAUGHTER    Normal delivery     SON    Pelvic inflammatory disease     RESOLVED: 18    Post concussion syndrome 2019    Recurrent pregnancy loss (CODE)     Thyroid nodule 2019       Past Surgical History:   Procedure Laterality Date    CHOLECYSTECTOMY      CYSTOSCOPY N/A 2016    Procedure: CYSTOSCOPY;  Surgeon: Kamryn Bloom MD;  [facility];  Service:     HYSTERECTOMY      LAPAROSCOPIC CHOLECYSTECTOMY      RESOLVED:     NASAL SEPTUM SURGERY      REESOLVED: ; DEVIATION    [place] LAPAROSCOPY W TOTAL HYSTERECTOMY UTERUS 250 GM/< N/A 2016    Procedure: HYSTERECTOMY LAPAROSCOPIC TOTAL ;  Surgeon: Kamryn Bloom MD;  [facility];  Service: Gynecology    SALPINGECTOMY Bilateral 2016    Procedure: SALPINGECTOMY;  Surgeon: Kamryn Bloom MD;  [facility];  Service:     TONSILLECTOMY      RESOLVED:     TUBAL LIGATION      RESOLVED: 14         Current Outpatient Medications:     albuterol (PROVENTIL HFA,VENTOLIN HFA) 90 mcg/act inhaler, INHALE 2 PUFFS EVERY 6 HOURS AS NEEDED FOR WHEEZING, Disp: 18 g, Rfl: 2    amitriptyline (ELAVIL) 25 mg tablet, TAKE 1 TABLET BY MOUTH DAILY AT BEDTIME, Disp: 90 tablet, Rfl: 1    buPROPion (WELLBUTRIN XL) 150 mg 24 hr tablet, Take 1 tablet (150 mg total) by mouth every morning, Disp: 30 tablet, Rfl: 2    famotidine (PEPCID) 40 MG tablet, TAKE 1 TABLET BY MOUTH DAILY AT BEDTIME, Disp: 90 tablet, Rfl: 1    gabapentin (NEURONTIN) 300 mg capsule, Take 1 capsule (300  mg total) by mouth 3 (three) times a day, Disp: 90 capsule, Rfl: 2    lisinopril (ZESTRIL) 30 mg tablet, Take 1 tablet (30 mg total) by mouth daily, Disp: 90 tablet, Rfl: 1    methocarbamol (ROBAXIN) 500 mg tablet, TAKE 1 TABLET UP TO 3 TIMES A DAY AS NEEDED, Disp: 90 tablet, Rfl: 0    omeprazole (PriLOSEC) 20 mg delayed release capsule, TAKE 1 CAPSULE BY MOUTH EVERY DAY, Disp: 90 capsule, Rfl: 1    phentermine 37.5 MG capsule, Take 1 capsule (37.5 mg total) by mouth every morning, Disp: 30 capsule, Rfl: 2  No current facility-administered medications for this encounter.    Facility-Administered Medications Ordered in Other Encounters:     hydrocortisone (ANUSOL-HC) 2.5 % rectal cream, , Topical, 4x Daily PRN, Jacob Casper MD    No Known Allergies    Physical Exam:   Vitals:    03/04/24 1147   BP: 138/85   Pulse: 77   Resp: 20   Temp:    SpO2: 97%     General: Awake, Alert, Oriented x 3, Mood and affect appropriate  Respiratory: Respirations even and unlabored  Cardiovascular: Peripheral pulses intact; no edema  Musculoskeletal Exam: normal gait    ASA Score: 2    Patient/Chart Verification  Patient ID Verified: Verbal  Consents Confirmed: Procedural, To be obtained in the Pre-Procedure area  H&P( within 30 days) Verified: Yes  Allergies Reviewed: Yes  Anticoag/NSAID held?: NA  Currently on antibiotics?: NA  Pregnancy denied?: NA    Assessment:   1. Cervical radiculopathy        Plan: interlaminar cervical epidural steroid injection C7-T1

## 2024-03-04 NOTE — DISCHARGE INSTRUCTIONS
Epidural Steroid Injection   WHAT YOU NEED TO KNOW:   An epidural steroid injection (ANG) is a procedure to inject steroid medicine into the epidural space. The epidural space is between your spinal cord and vertebrae. Steroids reduce inflammation and fluid buildup in your spine that may be causing pain. You may be given pain medicine along with the steroids.          ACTIVITY  Do not drive or operate machinery today.  No strenuous activity today - bending, lifting, etc.  You may resume normal activites starting tomorrow - start slowly and as tolerated.  You may shower today, but no tub baths or hot tubs.  You may have numbness for several hours from the local anesthetic. Please use caution and common sense, especially with weight-bearing activities.    CARE OF THE INJECTION SITE  If you have soreness or pain, apply ice to the area today (20 minutes on/20 minutes off).  Starting tomorrow, you may use warm, moist heat or ice if needed.  You may have an increase or change in your discomfort for 36-48 hours after your treatment.  Apply ice and continue with any pain medication you have been prescribed.  Notify the [facility] if you have any of the following: redness, drainage, swelling, headache, stiff neck or fever above 100°F.    SPECIAL INSTRUCTIONS  Our office will contact you in approximately 7 days for a progress report.    MEDICATIONS  Continue to take all routine medications.  Our office may have instructed you to hold some medications.    As no general anesthesia was used in today's procedure, you should not experience any side effects related to anesthesia.     If you are diabetic, the steroids used in today's injection may temporarily increase your blood sugar levels after the first few days after your injection. Please keep a close eye on your sugars and alert the doctor who manages your diabetes if your sugars are significantly high from your baseline or you are symptomatic.     If you have a  problem specifically related to your procedure, please call our office at [phone].  Problems not related to your procedure should be directed to your primary care physician.

## 2024-03-06 ENCOUNTER — CONSULT (OUTPATIENT)
Dept: NEUROSURGERY | Facility: CLINIC | Age: 44
End: 2024-03-06
Payer: COMMERCIAL

## 2024-03-06 VITALS
RESPIRATION RATE: 16 BRPM | OXYGEN SATURATION: 99 % | DIASTOLIC BLOOD PRESSURE: 88 MMHG | BODY MASS INDEX: 43.19 KG/M2 | WEIGHT: 253 LBS | HEIGHT: 64 IN | SYSTOLIC BLOOD PRESSURE: 160 MMHG | HEART RATE: 80 BPM | TEMPERATURE: 98.1 F

## 2024-03-06 DIAGNOSIS — Z01.818 PRE-PROCEDURAL EXAMINATION: Primary | ICD-10-CM

## 2024-03-06 DIAGNOSIS — M54.12 CERVICAL RADICULOPATHY: ICD-10-CM

## 2024-03-06 PROCEDURE — 99244 OFF/OP CNSLTJ NEW/EST MOD 40: CPT | Performed by: SPECIALIST

## 2024-03-06 RX ORDER — CHLORHEXIDINE GLUCONATE ORAL RINSE 1.2 MG/ML
15 SOLUTION DENTAL ONCE
OUTPATIENT
Start: 2024-03-06 | End: 2024-03-06

## 2024-03-06 NOTE — PROGRESS NOTES
History and Physical - Neurosurgery   Alejandra Cpoe 43 y.o. female MRN: [id number]      Assessment:    I examined the patient, reviewed the diagnostic studies, and had a very pleasant and detailed discussion with Alejandra regarding our findings.  We reviewed with the patient in detail the MR study on the computer monitor, and Alejandra was easily able to appreciate with us the degenerative changes/stenosis at C4-5, C5-6.  She also had lesser stenosis at C6-7 as well as some other levels.  At C5-6 she actually had some abutment along her spinal cord, which was her worst level.  Fortunately, on exam the patient's strength was equal and symmetric, and there were no areas of decreased sensation to touch.     The bulk of our discussion today was devoted to treatment options, and we reviewed both surgical and nonsurgical treatment modalities.  She already underwent 6 weeks of physical therapy with minimal improvement.  She had her first epidural injection a few days ago, and she tells me that she is no better, and in fact may be slightly worse.  At this point she is very tired of having to deal with this neck pain.  She states that it is constant and is severely affecting the quality of her life.  She states that she is now prepared to have surgical discussions regarding her neck issues.  I had a very lengthy discussion with her about all of the treatment alternatives.  We talked about surgical and nonsurgical options again.  In the end, the patient was mentally prepared to proceed with surgery having exhausted both physical therapy as well as pain management.  She states that she would be willing to get another epidural injection, but because she is worse, she prefers to have more of a permanent fix as opposed to a temporary solution.     Alejandra Cope has consented to undergo:     1. Right C4-5, C5-6 anterior cervical discectomy 2. C4-5, C5-6 interbody grafting (allograft), C4-6 plating, variation of above surgery.    Surgery was  described to the patient at length in a plain and simple fashion with the use of anatomic models as well as their own diagnostic study.  I reviewed in detail the expected hospitalization and postoperative recovery.  The concept that no guarantee with respect to the results of any operative procedure were discussed with the patient.  We also discussed with Alejandra in a plain and simple fashion reasonable expectations after surgery.  Considering the duration of her symptomatology and neck/radiating arm pain, there simply can be no guarantees that she will have full resolution of her complaints of pain.  The extent of the patient's post-operative pain is one thing that we can never predict, and whether or not any such lingering pain will allow the patient to return to their current line of work.  While we are hopeful that Alejandra will improve in light of her significant degenerative changes  and cervical stenosis, it is not unreasonable for patient's to have lingering neck discomfort along the back of the neck postoperatively.  In addition, the patient might also have continued discomfort in the arms/hands and if the nerves are already injured, the pain they are having might not improve fully.  To this end we discussed postoperative pain management, namely narcotic use.  We informed the patient that we will manage their pain medications during the expected postoperative period with minimal to no use of narcotics, if possible.  In the event the patient's pain is not well controlled beyond this period, the patient will need to be referred to pain management or their primary care physician, as long-term management is beyond the scope of our practice. Alejandra vocalized her understanding of these fine points, and appreciates reasonable expectations after surgery.       I reviewed with the patient the risks, indications and benefits of surgery at length. Risks of surgery were reviewed and included, but are not limited to, death,  paralysis, infection, CSF leak requiring additional surgery or procedures, bleeding, stroke, phlebitis, pulmonary emboli, anesthetic complications, pneumonia, heart attack or other medical complications during or after surgery, bladder and anal sphincter dysfunction, sexual dysfunction, the fact that the results of surgery can never be guaranteed, pseudoarthrosis, implant or hardware complications, the need for further surgeries, complications with incision healing, temporary or permanent swallowing difficulty, temporary or permanent hoarseness and/or weak voice, acceleration or development of adjacent level degenerative disease, and other unforeseen complications. I also explained to her that she may ultimately require surgery at other levels as well in the future should they continue to worsen as this surgery does not prevent those levels from worsening, but only addresses the worse levels at this point. In a small percentage of patients their pain does not improve with surgery, and an even smaller percentage of patients have worsening pain. Furthermore, Alejandra has been symptomatic for many months (since before Thanksgiving), and we again advised the patient in a plain and simple fashion that we cannot guarantee full resolution of their complaints of pain. In fact, reasonable expectations after surgery would include some sort of lingering posterior neck pain. Indications for surgery include radiographic evidence of cervical stenosis worse at C4-5 and C5-6 and severe neck and radiating arm pain and neck stiffness. Benefits of surgery include the hopeful improvement and resolution of the patient's complaints of pain and neck stiffness. All of the patient's questions were answered to her satisfaction, and in this fashion informed consent for the proposed operative procedure was obtained. Alejandra Cope has signed the consent for procedure. Should the patient have any further questions or concerns, she knows to give me a  call at the office.     I will order our office's standard pre-operative labs and review them before the patient is taken to the operating room.  We will refer the patient back to their primary care physician for pre-operative clearance in light of her hypertension and other comorbidites.   I expect this patient will require inpatient stay 1-2 days.     It was our pleasure seeing Alejandra Cope in the office today, and we look forward to continuing her care during their hospitalization and post-operative recovery.  Should Alejandra have any further questions or concerns, she knows that she is always free to give me a call at the office.  The patient expressed her gratitude to us for our detailed assessment and explanation of our findings, and was in agreement with this treatment plan.    Expected postoperative course, including activity restrictions, expected pain and postoperative medication were reviewed.    Patient provided verbal consent to surgical procedure and signed consent form: Yes    History, physical examination and diagnostic tests were reviewed and questions answered. Diagnosis, care plan and treatment options were discussed. The patient understand instructions and will follow up as directed.    Plan:    Follow-up: Patient will have surgery at [facility] at some point in April to include:    1. Right C4-5, C5-6 anterior cervical discectomy 2. C4-5, C5-6 interbody grafting (allograft), C4-6 plating, variation of above surgery.    She will be referred back to her primary care physician to get preop medical clearance.    We will obtain our routine preoperative laboratory tests.    We we will work with her on solidifying a date, but our tentative plan is early April.  She still needs preauthorization for surgery for her insurance company.  She also needs the preoperative medical clearance, as described above.    I informed her to stay away from nonsteroidal anti-inflammatory medications.  I advised her  to only take Tylenol beginning 7 days prior to surgery.  This helps control bleeding.    Problem List Items Addressed This Visit          Nervous and Auditory    Cervical radiculopathy    Relevant Orders    Case request operating room: 1. Right C4-5, C5-6 anterior cervical discectomy  2. C4-5, C5-6 interbody grafting (allograft-cornerstone bone) 3. C4-6 plating 4. Variation of above surgery (Completed)       Subjective/Objective     Chief Complaint    Neck pain, radiating arm pain, neck stiffness    HPI    This is a pleasant 43-year-old female who began experiencing neck pain, radiating arm pain, as well as neck stiffness that started before Thanksgiving of 2023.  She denies any traumatic event, fall, accident, or other.  She denies any bowel or bladder.  She underwent 6 weeks of physical therapy before she could obtain an MRI study.  The physical therapy did not help with her pain that much.  She continues to have diminished quality of life secondary to this fairly significant neck pain and muscle spasms.  She also has pain that radiates down her arms.  She did undergo an epidural steroid injection a few days ago.  She was very impressed with the pain management team, but she says that the pain did not improve.  Fact, she states that she feels worse at times since the shot.  I explained to her that sometimes this may take some time to take effect.  However, she is here today to have potential surgical discussions.    Family History    Family History   Problem Relation Age of Onset    Venous thrombosis Mother         ACUTE; OF DEEP VESSELS OF THE DISTAL LOWER EXTREMITY    COPD Mother     Heart disease Mother     Hypertension Mother     Mental illness Mother     Anxiety disorder Mother     OCD Mother     Colon cancer Father 59    Alcohol abuse Father     No Known Problems Daughter     Kidney disease Maternal Grandmother     No Known Problems Maternal Grandfather     Breast cancer Paternal Grandmother 40    Diabetes  Paternal Grandfather         MELLITUS    No Known Problems Son     No Known Problems Maternal Aunt     No Known Problems Maternal Aunt        Social History    Social History     Socioeconomic History    Marital status: /Civil Union     Spouse name: Not on file    Number of children: Not on file    Years of education: Not on file    Highest education level: Not on file   Occupational History    Occupation: employed   Tobacco Use    Smoking status: Former     Types: Cigars     Passive exposure: Current (rare exposure when drinking)    Smokeless tobacco: Never   Vaping Use    Vaping status: Never Used   Substance and Sexual Activity    Alcohol use: Yes     Comment: socially    Drug use: No    Sexual activity: Yes     Partners: Male     Birth control/protection: None   Other Topics Concern    Not on file   Social History Narrative    CA[employer] USE        Employed Full Time- [employer] -      Social Determinants of Health     Financial Resource Strain: Not on file   Food Insecurity: Not on file   Transportation Needs: Not on file   Physical Activity: Not on file   Stress: Not on file   Social Connections: Not on file   Intimate Partner Violence: Not on file   Housing Stability: Not on file       Past Medical History    Past Medical History:   Diagnosis Date    Anxiety     Asthma     Class 3 severe obesity due to excess calories without serious comorbidity with body mass index (BMI) of 40.0 to 44.9 in adult (HCC) 2022    Complete spontaneous  without complication     FIVE TIMES    Concussion     Depression     Headache     Hypertension     Insomnia     Normal delivery     DAUGHTER    Normal delivery     SON    Pelvic inflammatory disease     RESOLVED: 18    Post concussion syndrome 2019    Recurrent pregnancy loss (CODE)     Thyroid nodule 2019       Surgical History    Past Surgical History:   Procedure Laterality Date    CHOLECYSTECTOMY      CYSTOSCOPY N/A  12/28/2016    Procedure: CYSTOSCOPY;  Surgeon: Kamryn Bloom MD;  Location: [facility];  Service:     HYSTERECTOMY      LAPAROSCOPIC CHOLECYSTECTOMY      RESOLVED: 2006    NASAL SEPTUM SURGERY      REESOLVED: 1999; DEVIATION    [place] LAPAROSCOPY W TOTAL HYSTERECTOMY UTERUS 250 GM/< N/A 12/28/2016    Procedure: HYSTERECTOMY LAPAROSCOPIC TOTAL ;  Surgeon: Kamryn Bloom MD;  Location: [facility];  Service: Gynecology    SALPINGECTOMY Bilateral 12/28/2016    Procedure: SALPINGECTOMY;  Surgeon: Kamryn Bloom MD;  Location: [facility];  Service:     TONSILLECTOMY      RESOLVED: 1997    TUBAL LIGATION      RESOLVED: 1/22/14       Medications      Current Outpatient Medications:     albuterol (PROVENTIL HFA,VENTOLIN HFA) 90 mcg/act inhaler, INHALE 2 PUFFS EVERY 6 HOURS AS NEEDED FOR WHEEZING, Disp: 18 g, Rfl: 2    amitriptyline (ELAVIL) 25 mg tablet, TAKE 1 TABLET BY MOUTH DAILY AT BEDTIME, Disp: 90 tablet, Rfl: 1    buPROPion (WELLBUTRIN XL) 150 mg 24 hr tablet, Take 1 tablet (150 mg total) by mouth every morning, Disp: 30 tablet, Rfl: 2    famotidine (PEPCID) 40 MG tablet, TAKE 1 TABLET BY MOUTH DAILY AT BEDTIME, Disp: 90 tablet, Rfl: 1    gabapentin (NEURONTIN) 300 mg capsule, Take 1 capsule (300 mg total) by mouth 3 (three) times a day, Disp: 90 capsule, Rfl: 2    lisinopril (ZESTRIL) 30 mg tablet, Take 1 tablet (30 mg total) by mouth daily, Disp: 90 tablet, Rfl: 1    methocarbamol (ROBAXIN) 500 mg tablet, TAKE 1 TABLET UP TO 3 TIMES A DAY AS NEEDED, Disp: 90 tablet, Rfl: 0    omeprazole (PriLOSEC) 20 mg delayed release capsule, TAKE 1 CAPSULE BY MOUTH EVERY DAY, Disp: 90 capsule, Rfl: 1    phentermine 37.5 MG capsule, Take 1 capsule (37.5 mg total) by mouth every morning, Disp: 30 capsule, Rfl: 2  No current facility-administered medications for this visit.    Facility-Administered Medications Ordered in Other Visits:     hydrocortisone (ANUSOL-HC) 2.5 % rectal cream, , Topical, 4x Daily PRN, Jacob Casper MD    Allergies    No Known  Allergies    The following portions of the patient's history were reviewed and updated as appropriate: allergies, current medications, past family history, past medical history, past social history, past surgical history, and problem list.    Investigations    I personally reviewed the MRI cervical spine-2/10/24 results with the patient:    Personal interpretation.     The worst levels seem to be at C4-5 and C5-6 where there is some abutment of the cord. The C5-6 level is worse than the C4-5 level. There is also some stenosis and findings at other levels as well, but these two seem to be the worst.    Radiology reports  C2-C3: Tiny central disc protrusion without stenosis.     C3-C4: Right paracentral disc protrusion without stenosis.     C4-C5: Right paracentral disc osteophyte complex resulting in slight cord indentation without myelomalacia. Facet hypertrophy noted bilaterally. Mild bilateral foraminal stenosis at this level.     C5-C6: Disc osteophyte complex with left greater than right uncovertebral hypertrophy. There is slight ventral cord indentation without myelomalacia. Facet arthrosis noted. There is moderate left and mild right foraminal narrowing. Correlate clinically   for left greater than right C6 radiculopathy.     C6-C7: Right paracentral disc protrusion resulting in slight thecal sac indentation. Foramina remain patent at this level.     C7-T1:  Normal.     UPPER THORACIC DISC SPACES:  Normal.     OTHER FINDINGS:  None.     IMPRESSION:  Mild to moderate spondylotic changes of the cervical spine greater than expected for patient age. Recommend surgical consultation.     The study was marked in EPIC for significant notification.        Workstation performed: [id number]     Review of Systems   Constitutional:  Positive for activity change (difficult to perform activities or stand for long periods of time, pain interrupts some non-work activities) and fatigue.   HENT: Negative.  Negative for sinus  "pressure.    Eyes:  Positive for visual disturbance (only w/ dizziness which is not often).   Respiratory: Negative.     Cardiovascular: Negative.    Gastrointestinal: Negative.    Endocrine: Negative.    Genitourinary: Negative.    Musculoskeletal:  Positive for neck pain (radiates down back and left arm, daily pain). Negative for arthralgias and myalgias.   Skin: Negative.    Allergic/Immunologic: Negative.    Neurological:  Positive for dizziness, weakness (due to pain, not as strong as her previous \"normal\"), light-headedness (occasional, due to pain) and headaches (occasional when pain is severe). Negative for tremors, seizures and syncope.   Hematological: Negative.    Psychiatric/Behavioral:  Positive for sleep disturbance (due to pain). Negative for behavioral problems, confusion and decreased concentration.    Physical Exam    Vitals:  Blood pressure 160/88, pulse 80, temperature 98.1 °F (36.7 °C), temperature source Temporal, resp. rate 16, height 5' 4\" (1.626 m), weight 115 kg (253 lb), last menstrual period 12/01/2016, SpO2 99%, not currently breastfeeding.,Body mass index is 43.43 kg/m².    Physical Exam      General appearance: alert, appears stated age, cooperative and no distress  Head: Normocephalic, without obvious abnormality, atraumatic  Eyes: EOMI, PERRL  Neck: supple, symmetrical, trachea midline and NT  Back: no kyphosis present, no tenderness to percussion or palpation  Lungs: non labored breathing, equal breath sounds bilaterally without wheezing or crackles  Heart: regular heart rate, normal S1, S2, no murmors  Abdomen: Soft, nontender, nondistended. No pain to palpation  Neurologic:   Mental status: Alert, oriented x 3, thought content appropriate  Cranial nerves: grossly intact (Cranial nerves II-XII)  Sensory: normal to light touch  Motor: moving all extremities without focal weakness   Reflexes: 1+ and symmetric, but slightly diminished possibly diabetes related  Coordination: finger to " nose normal bilaterally, no drift bilaterally     Dilia Lucas MD

## 2024-03-11 ENCOUNTER — TELEPHONE (OUTPATIENT)
Dept: PAIN MEDICINE | Facility: CLINIC | Age: 44
End: 2024-03-11

## 2024-03-18 ENCOUNTER — APPOINTMENT (OUTPATIENT)
Dept: LAB | Facility: HOSPITAL | Age: 44
End: 2024-03-18
Payer: COMMERCIAL

## 2024-03-18 ENCOUNTER — OFFICE VISIT (OUTPATIENT)
Dept: LAB | Facility: HOSPITAL | Age: 44
End: 2024-03-18
Payer: COMMERCIAL

## 2024-03-18 DIAGNOSIS — M54.12 CERVICAL RADICULOPATHY: ICD-10-CM

## 2024-03-18 DIAGNOSIS — Z01.818 PRE-PROCEDURAL EXAMINATION: ICD-10-CM

## 2024-03-18 LAB
ALBUMIN SERPL BCP-MCNC: 3.7 G/DL (ref 3.5–5)
ALP SERPL-CCNC: 53 U/L (ref 34–104)
ALT SERPL W P-5'-P-CCNC: 39 U/L (ref 7–52)
ANION GAP SERPL CALCULATED.3IONS-SCNC: 8 MMOL/L (ref 4–13)
APTT PPP: 26 SECONDS (ref 23–37)
AST SERPL W P-5'-P-CCNC: 23 U/L (ref 13–39)
ATRIAL RATE: 65 BPM
BACTERIA UR QL AUTO: ABNORMAL /HPF
BASOPHILS # BLD AUTO: 0.05 THOUSANDS/ÂΜL (ref 0–0.1)
BASOPHILS NFR BLD AUTO: 1 % (ref 0–1)
BILIRUB SERPL-MCNC: 0.44 MG/DL (ref 0.2–1)
BILIRUB UR QL STRIP: NEGATIVE
BUN SERPL-MCNC: 13 MG/DL (ref 5–25)
CALCIUM SERPL-MCNC: 8.7 MG/DL (ref 8.4–10.2)
CHLORIDE SERPL-SCNC: 102 MMOL/L (ref 96–108)
CLARITY UR: CLEAR
CO2 SERPL-SCNC: 28 MMOL/L (ref 21–32)
COLOR UR: YELLOW
CREAT SERPL-MCNC: 0.92 MG/DL (ref 0.6–1.3)
EOSINOPHIL # BLD AUTO: 0.1 THOUSAND/ÂΜL (ref 0–0.61)
EOSINOPHIL NFR BLD AUTO: 1 % (ref 0–6)
ERYTHROCYTE [DISTWIDTH] IN BLOOD BY AUTOMATED COUNT: 11.9 % (ref 11.6–15.1)
EST. AVERAGE GLUCOSE BLD GHB EST-MCNC: 111 MG/DL
GFR SERPL CREATININE-BSD FRML MDRD: 77 ML/MIN/1.73SQ M
GLUCOSE P FAST SERPL-MCNC: 98 MG/DL (ref 65–99)
GLUCOSE UR STRIP-MCNC: NEGATIVE MG/DL
HBA1C MFR BLD: 5.5 %
HCT VFR BLD AUTO: 41.5 % (ref 34.8–46.1)
HGB BLD-MCNC: 13.5 G/DL (ref 11.5–15.4)
HGB UR QL STRIP.AUTO: ABNORMAL
IMM GRANULOCYTES # BLD AUTO: 0.01 THOUSAND/UL (ref 0–0.2)
IMM GRANULOCYTES NFR BLD AUTO: 0 % (ref 0–2)
INR PPP: 0.96 (ref 0.84–1.19)
KETONES UR STRIP-MCNC: NEGATIVE MG/DL
LEUKOCYTE ESTERASE UR QL STRIP: NEGATIVE
LYMPHOCYTES # BLD AUTO: 2.51 THOUSANDS/ÂΜL (ref 0.6–4.47)
LYMPHOCYTES NFR BLD AUTO: 36 % (ref 14–44)
MCH RBC QN AUTO: 31.8 PG (ref 26.8–34.3)
MCHC RBC AUTO-ENTMCNC: 32.5 G/DL (ref 31.4–37.4)
MCV RBC AUTO: 98 FL (ref 82–98)
MONOCYTES # BLD AUTO: 0.43 THOUSAND/ÂΜL (ref 0.17–1.22)
MONOCYTES NFR BLD AUTO: 6 % (ref 4–12)
NEUTROPHILS # BLD AUTO: 3.87 THOUSANDS/ÂΜL (ref 1.85–7.62)
NEUTS SEG NFR BLD AUTO: 56 % (ref 43–75)
NITRITE UR QL STRIP: NEGATIVE
NON-SQ EPI CELLS URNS QL MICRO: ABNORMAL /HPF
NRBC BLD AUTO-RTO: 0 /100 WBCS
P AXIS: 21 DEGREES
PH UR STRIP.AUTO: 6 [PH]
PLATELET # BLD AUTO: 265 THOUSANDS/UL (ref 149–390)
PMV BLD AUTO: 10.3 FL (ref 8.9–12.7)
POTASSIUM SERPL-SCNC: 3.6 MMOL/L (ref 3.5–5.3)
PR INTERVAL: 172 MS
PROT SERPL-MCNC: 6.5 G/DL (ref 6.4–8.4)
PROT UR STRIP-MCNC: NEGATIVE MG/DL
PROTHROMBIN TIME: 12.9 SECONDS (ref 11.6–14.5)
QRS AXIS: -11 DEGREES
QRSD INTERVAL: 84 MS
QT INTERVAL: 422 MS
QTC INTERVAL: 438 MS
RBC # BLD AUTO: 4.25 MILLION/UL (ref 3.81–5.12)
RBC #/AREA URNS AUTO: ABNORMAL /HPF
SODIUM SERPL-SCNC: 138 MMOL/L (ref 135–147)
SP GR UR STRIP.AUTO: 1.02
T WAVE AXIS: 8 DEGREES
UROBILINOGEN UR QL STRIP.AUTO: 0.2 E.U./DL
VENTRICULAR RATE: 65 BPM
WBC # BLD AUTO: 6.97 THOUSAND/UL (ref 4.31–10.16)
WBC #/AREA URNS AUTO: ABNORMAL /HPF

## 2024-03-18 PROCEDURE — 85610 PROTHROMBIN TIME: CPT

## 2024-03-18 PROCEDURE — 93005 ELECTROCARDIOGRAM TRACING: CPT

## 2024-03-18 PROCEDURE — 36415 COLL VENOUS BLD VENIPUNCTURE: CPT

## 2024-03-18 PROCEDURE — 85730 THROMBOPLASTIN TIME PARTIAL: CPT

## 2024-03-18 PROCEDURE — 83036 HEMOGLOBIN GLYCOSYLATED A1C: CPT

## 2024-03-18 PROCEDURE — 85025 COMPLETE CBC W/AUTO DIFF WBC: CPT

## 2024-03-18 PROCEDURE — 81001 URINALYSIS AUTO W/SCOPE: CPT

## 2024-03-18 PROCEDURE — 87081 CULTURE SCREEN ONLY: CPT

## 2024-03-18 PROCEDURE — 93010 ELECTROCARDIOGRAM REPORT: CPT | Performed by: INTERNAL MEDICINE

## 2024-03-18 PROCEDURE — 80053 COMPREHEN METABOLIC PANEL: CPT

## 2024-03-19 LAB — MRSA NOSE QL CULT: NORMAL

## 2024-03-25 ENCOUNTER — CONSULT (OUTPATIENT)
Dept: INTERNAL MEDICINE CLINIC | Facility: CLINIC | Age: 44
End: 2024-03-25
Payer: COMMERCIAL

## 2024-03-25 VITALS
OXYGEN SATURATION: 99 % | SYSTOLIC BLOOD PRESSURE: 118 MMHG | WEIGHT: 258 LBS | DIASTOLIC BLOOD PRESSURE: 82 MMHG | HEIGHT: 64 IN | BODY MASS INDEX: 44.05 KG/M2 | TEMPERATURE: 97.5 F | HEART RATE: 75 BPM

## 2024-03-25 DIAGNOSIS — M54.12 CERVICAL RADICULOPATHY: ICD-10-CM

## 2024-03-25 DIAGNOSIS — E66.01 CLASS 3 SEVERE OBESITY DUE TO EXCESS CALORIES WITHOUT SERIOUS COMORBIDITY WITH BODY MASS INDEX (BMI) OF 40.0 TO 44.9 IN ADULT (HCC): ICD-10-CM

## 2024-03-25 DIAGNOSIS — Z01.818 VISIT FOR PRE-OPERATIVE EXAMINATION: Primary | ICD-10-CM

## 2024-03-25 DIAGNOSIS — M54.2 CERVICALGIA: ICD-10-CM

## 2024-03-25 DIAGNOSIS — I10 ESSENTIAL HYPERTENSION: ICD-10-CM

## 2024-03-25 PROCEDURE — 99244 OFF/OP CNSLTJ NEW/EST MOD 40: CPT | Performed by: INTERNAL MEDICINE

## 2024-03-25 NOTE — PROGRESS NOTES
Assessment/Plan:  Problem List Items Addressed This Visit          Cardiovascular and Mediastinum    Essential hypertension       Nervous and Auditory    Cervical radiculopathy       Surgery/Wound/Pain    Cervicalgia       Other    Class 3 severe obesity due to excess calories without serious comorbidity with body mass index (BMI) of 40.0 to 44.9 in adult (Formerly [facility])     Other Visit Diagnoses       Visit for pre-operative examination    -  Primary             Diagnoses and all orders for this visit:    Visit for pre-operative examination    Cervicalgia    Cervical radiculopathy    Essential hypertension    Class 3 severe obesity due to excess calories without serious comorbidity with body mass index (BMI) of 40.0 to 44.9 in adult (Formerly [facility])        No problem-specific Assessment & Plan notes found for this encounter.  A/P: PAT tests reviewed and c/o labs and EKG. Labs acceptable with EKG w/o acute changes. . Pt and co-morbidities are stable. Pt is a Carney's Class I and carries a cardiac risk of about 1%. Recommend holding any ASA, NSAID's, omega 3, and MVT one week prior to the procedure. On the day prior to sx and on the day of sx, pt to hold her ACEI. On the day of sx, may take with a sip of water, her wellbutrin, pepcid, preeti, and omeprazole. May resume her meds once taking po. Pt to use her SAE prior to the OR. Make anesthesia aware that pt has been on adderall for wt loss. Given h/o asthma and body habitus, aggressive pulmonary secretion control and watch for hypoventilation. Aggressive DVT prophylaxis if pt to be immobile. Recommend OTC ATC tylenol for 48 hours prior to sx to assist in pain control. Pt to continue to not employ her dentures.  No other recs at this time.  Thanks and good luck.   Subjective:      Patient ID: Alejandra Cope is a 43 y.o. female.    WM presents at the request of Dr. Infante for pre-op eval for upcoming C spine sx  tentatively scheduled for 4/24/24. Since last visit, doing well and no recent  illnesses. Remains active w/o difficulty and no falls. No travel history. Denies depression. No recent illnesses. No fever, chills, or sweats. No unexplained wt changes. Denies CP, SOB, or palpitations. No edema. No orthopnea or PND. No sz or syncope. No changes in bowel or bladder habits. PMH includes HTN, Asthma, former smoker, Migraines, DJD/DDD, thyroid nodule, MDD/GEOVANNI, and obesity. Past sx include tubal, hysterectomy, choly, nasal sx, and tonsils and reports no problems with prior procedures or anesthesia. Distant smoking and denies ETOH use. No history of DVT or PE. NO history of bleeding issues and is not on anti-coagulants. Has dental plates, but doesn't use them at this time. . Ongoing C spine issues. No objections to getting blood products if deemed necessary. Had PAT testing done.            The following portions of the patient's history were reviewed and updated as appropriate:   She has a past medical history of Anxiety, Asthma, Class 3 severe obesity due to excess calories without serious comorbidity with body mass index (BMI) of 40.0 to 44.9 in adult (HCC) (2022), Complete spontaneous  without complication, Concussion, Depression, Headache, Hypertension, Insomnia, Normal delivery, Normal delivery, Pelvic inflammatory disease, Post concussion syndrome (2019), Recurrent pregnancy loss (CODE), and Thyroid nodule (2019).,  does not have any pertinent problems on file.,   has a past surgical history that includes Tubal ligation; Cholecystectomy; Tonsillectomy; Nasal septum surgery; pr laparoscopy w total hysterectomy uterus 250 gm/< (N/A, 2016); Salpingectomy (Bilateral, 2016); CYSTOSCOPY (N/A, 2016); Laparoscopic cholecystectomy; and Hysterectomy.,  family history includes Alcohol abuse in her father; Anxiety disorder in her mother; Breast cancer (age of onset: 40) in her paternal grandmother; COPD in her mother; Colon cancer (age of onset: 59) in her father;  Diabetes in her paternal grandfather; Heart disease in her mother; Hypertension in her mother; Kidney disease in her maternal grandmother; Mental illness in her mother; No Known Problems in her daughter, maternal aunt, maternal aunt, maternal grandfather, and son; OCD in her mother; Venous thrombosis in her mother.,   reports that she has quit smoking. Her smoking use included cigars. She has been exposed to tobacco smoke. She has never used smokeless tobacco. She reports current alcohol use. She reports that she does not use drugs.,  has No Known Allergies..  Current Outpatient Medications   Medication Sig Dispense Refill    albuterol (PROVENTIL HFA,VENTOLIN HFA) 90 mcg/act inhaler INHALE 2 PUFFS EVERY 6 HOURS AS NEEDED FOR WHEEZING 18 g 2    amitriptyline (ELAVIL) 25 mg tablet TAKE 1 TABLET BY MOUTH DAILY AT BEDTIME 90 tablet 1    buPROPion (WELLBUTRIN XL) 150 mg 24 hr tablet Take 1 tablet (150 mg total) by mouth every morning 30 tablet 2    famotidine (PEPCID) 40 MG tablet TAKE 1 TABLET BY MOUTH DAILY AT BEDTIME 90 tablet 1    gabapentin (NEURONTIN) 300 mg capsule Take 1 capsule (300 mg total) by mouth 3 (three) times a day 90 capsule 2    lisinopril (ZESTRIL) 30 mg tablet Take 1 tablet (30 mg total) by mouth daily 90 tablet 1    methocarbamol (ROBAXIN) 500 mg tablet TAKE 1 TABLET UP TO 3 TIMES A DAY AS NEEDED 90 tablet 0    omeprazole (PriLOSEC) 20 mg delayed release capsule TAKE 1 CAPSULE BY MOUTH EVERY DAY 90 capsule 1    phentermine 37.5 MG capsule Take 1 capsule (37.5 mg total) by mouth every morning (Patient not taking: Reported on 3/25/2024) 30 capsule 2     No current facility-administered medications for this visit.     Facility-Administered Medications Ordered in Other Visits   Medication Dose Route Frequency Provider Last Rate Last Admin    hydrocortisone (ANUSOL-HC) 2.5 % rectal cream   Topical 4x Daily PRN Jacob Casper MD           Review of Systems   Constitutional:  Positive for activity change.  "Negative for chills, diaphoresis, fatigue and fever.   HENT: Negative.     Eyes:  Negative for visual disturbance.   Respiratory:  Negative for cough, chest tightness, shortness of breath and wheezing.    Cardiovascular:  Negative for chest pain, palpitations and leg swelling.   Gastrointestinal:  Negative for abdominal pain, constipation, diarrhea, nausea and vomiting.   Endocrine: Negative for cold intolerance and heat intolerance.   Genitourinary:  Negative for difficulty urinating, dysuria and frequency.   Musculoskeletal:  Positive for neck pain and neck stiffness. Negative for arthralgias, gait problem and myalgias.   Neurological:  Positive for numbness. Negative for dizziness, seizures, syncope, weakness, light-headedness and headaches.   Psychiatric/Behavioral:  Negative for confusion, dysphoric mood and sleep disturbance. The patient is not nervous/anxious.        PHQ-2/9 Depression Screening    Little interest or pleasure in doing things: 0 - not at all  Feeling down, depressed, or hopeless: 0 - not at all  Trouble falling or staying asleep, or sleeping too much: 1 - several days  Feeling tired or having little energy: 0 - not at all  Poor appetite or overeatin - not at all  Feeling bad about yourself - or that you are a failure or have let yourself or your family down: 0 - not at all  Trouble concentrating on things, such as reading the newspaper or watching television: 0 - not at all  Moving or speaking so slowly that other people could have noticed. Or the opposite - being so fidgety or restless that you have been moving around a lot more than usual: 0 - not at all  Thoughts that you would be better off dead, or of hurting yourself in some way: 0 - not at all        Objective:  Vitals:    24 0823   BP: 118/82   Pulse: 75   Temp: 97.5 °F (36.4 °C)   SpO2: 99%   Weight: 117 kg (258 lb)   Height: 5' 4\" (1.626 m)     Body mass index is 44.29 kg/m².     Physical Exam  Vitals and nursing note " reviewed.   Constitutional:       General: She is not in acute distress.     Appearance: Normal appearance. She is obese. She is not ill-appearing.   HENT:      Head: Normocephalic and atraumatic.      Comments: No dentures seen. NO oropharyngeal obstructions.     Mouth/Throat:      Mouth: Mucous membranes are moist.   Eyes:      Extraocular Movements: Extraocular movements intact.      Conjunctiva/sclera: Conjunctivae normal.      Pupils: Pupils are equal, round, and reactive to light.   Neck:      Vascular: No carotid bruit.      Comments: C spine restricted in extension.   Cardiovascular:      Rate and Rhythm: Normal rate and regular rhythm.      Heart sounds: Normal heart sounds. No murmur heard.  Pulmonary:      Effort: Pulmonary effort is normal. No respiratory distress.      Breath sounds: Normal breath sounds. No wheezing, rhonchi or rales.   Abdominal:      General: Bowel sounds are normal. There is no distension.      Palpations: Abdomen is soft.      Tenderness: There is no abdominal tenderness.   Musculoskeletal:      Cervical back: Rigidity present. No tenderness.      Right lower leg: No edema.      Left lower leg: No edema.   Lymphadenopathy:      Cervical: No cervical adenopathy.   Neurological:      General: No focal deficit present.      Mental Status: She is alert and oriented to person, place, and time. Mental status is at baseline.   Psychiatric:         Mood and Affect: Mood normal.         Behavior: Behavior normal.         Thought Content: Thought content normal.         Judgment: Judgment normal.

## 2024-04-01 ENCOUNTER — ANESTHESIA EVENT (OUTPATIENT)
Dept: PERIOP | Facility: HOSPITAL | Age: 44
DRG: 321 | End: 2024-04-01
Payer: COMMERCIAL

## 2024-04-01 NOTE — PRE-PROCEDURE INSTRUCTIONS
Pre-Surgery Instructions:   Medication Instructions    albuterol (PROVENTIL HFA,VENTOLIN HFA) 90 mcg/act inhaler Uses PRN- OK to take day of surgery    Cholecalciferol (Vitamin D-3) 125 MCG (5000 UT) TABS Stop taking 7 days prior to surgery.    gabapentin (NEURONTIN) 300 mg capsule Take day of surgery.    lisinopril (ZESTRIL) 30 mg tablet Hold day of surgery.    omeprazole (PriLOSEC) 20 mg delayed release capsule Take day of surgery.    Medication instructions for day surgery reviewed. Please use only a sip of water to take your instructed medications. Avoid aspirin and all over the counter vitamins, supplements and NSAIDS for one week prior to surgery per anesthesia guidelines. Tylenol is ok to take as needed.     You will receive a call one business day prior to surgery with an arrival time and hospital directions. If your surgery is scheduled on a Monday, the hospital will be calling you on the Friday prior to your surgery. If you have not heard from anyone by 8pm, please call the hospital supervisor through the hospital  at [phone]. ([place] [phone] or [place] [phone]).    Do not eat or drink anything after midnight the night before your surgery, including candy, mints, lifesavers, or chewing gum. Do not drink alcohol 24hrs before your surgery. Try not to smoke at least 24hrs before your surgery.       Follow the pre surgery showering instructions as listed in the “My Surgical Experience Booklet” or otherwise provided by your surgeon's office. Do not use a blade to shave the surgical area 1 week before surgery. It is okay to use a clean electric clippers up to 24 hours before surgery. Do not apply any lotions, creams, including makeup, cologne, deodorant, or perfumes after showering on the day of your surgery. Do not use dry shampoo, hair spray, hair gel, or any type of hair products.     No contact lenses, eye make-up, or artificial eyelashes. Remove nail polish, including gel polish,  and any artificial, gel, or acrylic nails if possible. Remove all jewelry including rings and body piercing jewelry.     Wear causal clothing that is easy to take on and off. Consider your type of surgery.    Keep any valuables, jewelry, piercings at home. Please bring any specially ordered equipment (sling, braces) if indicated.    Arrange for a responsible person to drive you to and from the hospital on the day of your surgery. Please confirm the visitor policy for the day of your procedure when you receive your phone call with an arrival time.     Call the surgeon's office with any new illnesses, exposures, or additional questions prior to surgery.    Please reference your “My Surgical Experience Booklet” for additional information to prepare for your upcoming surgery.

## 2024-04-22 ENCOUNTER — TELEPHONE (OUTPATIENT)
Dept: NEUROSURGERY | Facility: CLINIC | Age: 44
End: 2024-04-22

## 2024-04-22 NOTE — TELEPHONE ENCOUNTER
Pt called to request 5/10 instead of 5/8 appt due to transporation issue, pt is now 5/10/24 at 10 30

## 2024-04-24 ENCOUNTER — HOSPITAL ENCOUNTER (INPATIENT)
Facility: HOSPITAL | Age: 44
LOS: 2 days | Discharge: HOME | DRG: 321 | End: 2024-04-26
Attending: SPECIALIST | Admitting: SPECIALIST
Payer: COMMERCIAL

## 2024-04-24 ENCOUNTER — HOSPITAL ENCOUNTER (OUTPATIENT)
Dept: RADIOLOGY | Facility: HOSPITAL | Age: 44
Setting detail: SURGERY ADMIT
Discharge: HOME | DRG: 321 | End: 2024-04-24
Payer: COMMERCIAL

## 2024-04-24 ENCOUNTER — APPOINTMENT (INPATIENT)
Dept: RADIOLOGY | Facility: HOSPITAL | Age: 44
DRG: 321 | End: 2024-04-24
Payer: COMMERCIAL

## 2024-04-24 ENCOUNTER — ANESTHESIA (OUTPATIENT)
Dept: PERIOP | Facility: HOSPITAL | Age: 44
DRG: 321 | End: 2024-04-24
Payer: COMMERCIAL

## 2024-04-24 DIAGNOSIS — M54.12 CERVICAL RADICULOPATHY: Primary | ICD-10-CM

## 2024-04-24 DIAGNOSIS — M54.12 RADICULOPATHY, CERVICAL: ICD-10-CM

## 2024-04-24 LAB
ABO GROUP BLD: NORMAL
BLD GP AB SCN SERPL QL: NEGATIVE
GLUCOSE SERPL-MCNC: 127 MG/DL (ref 65–140)
RH BLD: POSITIVE
SPECIMEN EXPIRATION DATE: NORMAL

## 2024-04-24 PROCEDURE — C1713 ANCHOR/SCREW BN/BN,TIS/BN: HCPCS | Performed by: SPECIALIST

## 2024-04-24 PROCEDURE — 82948 REAGENT STRIP/BLOOD GLUCOSE: CPT

## 2024-04-24 PROCEDURE — 86901 BLOOD TYPING SEROLOGIC RH(D): CPT | Performed by: SPECIALIST

## 2024-04-24 PROCEDURE — 72040 X-RAY EXAM NECK SPINE 2-3 VW: CPT

## 2024-04-24 PROCEDURE — 00NW0ZZ RELEASE CERVICAL SPINAL CORD, OPEN APPROACH: ICD-10-PCS | Performed by: SPECIALIST

## 2024-04-24 PROCEDURE — 4A1104G MONITORING OF PERIPHERAL NERVOUS ELECTRICAL ACTIVITY, INTRAOPERATIVE, OPEN APPROACH: ICD-10-PCS | Performed by: SPECIALIST

## 2024-04-24 PROCEDURE — 86900 BLOOD TYPING SEROLOGIC ABO: CPT | Performed by: SPECIALIST

## 2024-04-24 PROCEDURE — 0RB30ZZ EXCISION OF CERVICAL VERTEBRAL DISC, OPEN APPROACH: ICD-10-PCS | Performed by: SPECIALIST

## 2024-04-24 PROCEDURE — 22551 ARTHRD ANT NTRBDY CERVICAL: CPT | Performed by: SPECIALIST

## 2024-04-24 PROCEDURE — 22845 INSERT SPINE FIXATION DEVICE: CPT | Performed by: SPECIALIST

## 2024-04-24 PROCEDURE — 99024 POSTOP FOLLOW-UP VISIT: CPT | Performed by: SPECIALIST

## 2024-04-24 PROCEDURE — 20930 SP BONE ALGRFT MORSEL ADD-ON: CPT | Performed by: SPECIALIST

## 2024-04-24 PROCEDURE — 22853 INSJ BIOMECHANICAL DEVICE: CPT | Performed by: SPECIALIST

## 2024-04-24 PROCEDURE — 22552 ARTHRD ANT NTRBD CERVICAL EA: CPT | Performed by: SPECIALIST

## 2024-04-24 PROCEDURE — 0RG20A0 FUSION OF 2 OR MORE CERVICAL VERTEBRAL JOINTS WITH INTERBODY FUSION DEVICE, ANTERIOR APPROACH, ANTERIOR COLUMN, OPEN APPROACH: ICD-10-PCS | Performed by: SPECIALIST

## 2024-04-24 PROCEDURE — 01N10ZZ RELEASE CERVICAL NERVE, OPEN APPROACH: ICD-10-PCS | Performed by: SPECIALIST

## 2024-04-24 PROCEDURE — 86850 RBC ANTIBODY SCREEN: CPT | Performed by: SPECIALIST

## 2024-04-24 DEVICE — PLATE 7200042 ATL VISION ELITE 42.5MM
Type: IMPLANTABLE DEVICE | Site: SPINE CERVICAL | Status: FUNCTIONAL
Brand: ATLANTIS® ANTERIOR CERVICAL PLATE SYSTEM

## 2024-04-24 DEVICE — IMPLANTABLE DEVICE: Type: IMPLANTABLE DEVICE | Site: SPINE CERVICAL | Status: FUNCTIONAL

## 2024-04-24 RX ORDER — DEXAMETHASONE SODIUM PHOSPHATE 10 MG/ML
INJECTION, SOLUTION INTRAMUSCULAR; INTRAVENOUS AS NEEDED
Status: DISCONTINUED | OUTPATIENT
Start: 2024-04-24 | End: 2024-04-24

## 2024-04-24 RX ORDER — MAGNESIUM HYDROXIDE 1200 MG/15ML
LIQUID ORAL AS NEEDED
Status: DISCONTINUED | OUTPATIENT
Start: 2024-04-24 | End: 2024-04-24 | Stop reason: HOSPADM

## 2024-04-24 RX ORDER — KETAMINE HCL IN NACL, ISO-OSM 100MG/10ML
SYRINGE (ML) INJECTION AS NEEDED
Status: DISCONTINUED | OUTPATIENT
Start: 2024-04-24 | End: 2024-04-24

## 2024-04-24 RX ORDER — GABAPENTIN 300 MG/1
300 CAPSULE ORAL 3 TIMES DAILY
Status: DISCONTINUED | OUTPATIENT
Start: 2024-04-24 | End: 2024-04-26 | Stop reason: HOSPADM

## 2024-04-24 RX ORDER — METHOCARBAMOL 750 MG/1
750 TABLET, FILM COATED ORAL EVERY 6 HOURS SCHEDULED
Status: DISCONTINUED | OUTPATIENT
Start: 2024-04-24 | End: 2024-04-26 | Stop reason: HOSPADM

## 2024-04-24 RX ORDER — CEPHALEXIN 500 MG/1
500 CAPSULE ORAL EVERY 6 HOURS SCHEDULED
Status: DISCONTINUED | OUTPATIENT
Start: 2024-04-26 | End: 2024-04-26 | Stop reason: HOSPADM

## 2024-04-24 RX ORDER — ONDANSETRON 2 MG/ML
4 INJECTION INTRAMUSCULAR; INTRAVENOUS ONCE AS NEEDED
Status: DISCONTINUED | OUTPATIENT
Start: 2024-04-24 | End: 2024-04-24 | Stop reason: HOSPADM

## 2024-04-24 RX ORDER — ALBUTEROL SULFATE 2.5 MG/3ML
2.5 SOLUTION RESPIRATORY (INHALATION) ONCE AS NEEDED
Status: DISCONTINUED | OUTPATIENT
Start: 2024-04-24 | End: 2024-04-24 | Stop reason: HOSPADM

## 2024-04-24 RX ORDER — CEFAZOLIN SODIUM 1 G/3ML
INJECTION, POWDER, FOR SOLUTION INTRAMUSCULAR; INTRAVENOUS AS NEEDED
Status: DISCONTINUED | OUTPATIENT
Start: 2024-04-24 | End: 2024-04-24

## 2024-04-24 RX ORDER — SODIUM CHLORIDE, SODIUM LACTATE, POTASSIUM CHLORIDE, CALCIUM CHLORIDE 600; 310; 30; 20 MG/100ML; MG/100ML; MG/100ML; MG/100ML
INJECTION, SOLUTION INTRAVENOUS CONTINUOUS PRN
Status: DISCONTINUED | OUTPATIENT
Start: 2024-04-24 | End: 2024-04-24

## 2024-04-24 RX ORDER — PROPOFOL 10 MG/ML
INJECTION, EMULSION INTRAVENOUS CONTINUOUS PRN
Status: DISCONTINUED | OUTPATIENT
Start: 2024-04-24 | End: 2024-04-24

## 2024-04-24 RX ORDER — OXYCODONE HYDROCHLORIDE 5 MG/1
5 TABLET ORAL EVERY 4 HOURS PRN
Status: DISCONTINUED | OUTPATIENT
Start: 2024-04-24 | End: 2024-04-26 | Stop reason: HOSPADM

## 2024-04-24 RX ORDER — FENTANYL CITRATE/PF 50 MCG/ML
50 SYRINGE (ML) INJECTION
Status: DISCONTINUED | OUTPATIENT
Start: 2024-04-24 | End: 2024-04-24 | Stop reason: HOSPADM

## 2024-04-24 RX ORDER — ACETAMINOPHEN 325 MG/1
975 TABLET ORAL EVERY 8 HOURS SCHEDULED
Status: DISCONTINUED | OUTPATIENT
Start: 2024-04-24 | End: 2024-04-26 | Stop reason: HOSPADM

## 2024-04-24 RX ORDER — SODIUM CHLORIDE, SODIUM LACTATE, POTASSIUM CHLORIDE, CALCIUM CHLORIDE 600; 310; 30; 20 MG/100ML; MG/100ML; MG/100ML; MG/100ML
125 INJECTION, SOLUTION INTRAVENOUS CONTINUOUS
Status: DISCONTINUED | OUTPATIENT
Start: 2024-04-24 | End: 2024-04-24

## 2024-04-24 RX ORDER — LIDOCAINE HYDROCHLORIDE AND EPINEPHRINE 10; 10 MG/ML; UG/ML
INJECTION, SOLUTION INFILTRATION; PERINEURAL AS NEEDED
Status: DISCONTINUED | OUTPATIENT
Start: 2024-04-24 | End: 2024-04-24 | Stop reason: HOSPADM

## 2024-04-24 RX ORDER — CEFAZOLIN SODIUM 1 G/50ML
1000 SOLUTION INTRAVENOUS EVERY 8 HOURS
Status: COMPLETED | OUTPATIENT
Start: 2024-04-24 | End: 2024-04-26

## 2024-04-24 RX ORDER — HYDROMORPHONE HCL/PF 1 MG/ML
0.5 SYRINGE (ML) INJECTION
Status: DISCONTINUED | OUTPATIENT
Start: 2024-04-24 | End: 2024-04-24 | Stop reason: HOSPADM

## 2024-04-24 RX ORDER — SODIUM CHLORIDE 9 MG/ML
INJECTION, SOLUTION INTRAVENOUS CONTINUOUS PRN
Status: DISCONTINUED | OUTPATIENT
Start: 2024-04-24 | End: 2024-04-24

## 2024-04-24 RX ORDER — MIDAZOLAM HYDROCHLORIDE 2 MG/2ML
INJECTION, SOLUTION INTRAMUSCULAR; INTRAVENOUS AS NEEDED
Status: DISCONTINUED | OUTPATIENT
Start: 2024-04-24 | End: 2024-04-24

## 2024-04-24 RX ORDER — LIDOCAINE HYDROCHLORIDE 10 MG/ML
INJECTION, SOLUTION EPIDURAL; INFILTRATION; INTRACAUDAL; PERINEURAL AS NEEDED
Status: DISCONTINUED | OUTPATIENT
Start: 2024-04-24 | End: 2024-04-24

## 2024-04-24 RX ORDER — PANTOPRAZOLE SODIUM 40 MG/1
40 TABLET, DELAYED RELEASE ORAL
Status: DISCONTINUED | OUTPATIENT
Start: 2024-04-25 | End: 2024-04-26 | Stop reason: HOSPADM

## 2024-04-24 RX ORDER — HYDROMORPHONE HCL/PF 1 MG/ML
SYRINGE (ML) INJECTION AS NEEDED
Status: DISCONTINUED | OUTPATIENT
Start: 2024-04-24 | End: 2024-04-24

## 2024-04-24 RX ORDER — ALBUTEROL SULFATE 90 UG/1
2 AEROSOL, METERED RESPIRATORY (INHALATION) EVERY 6 HOURS PRN
Status: DISCONTINUED | OUTPATIENT
Start: 2024-04-24 | End: 2024-04-26 | Stop reason: HOSPADM

## 2024-04-24 RX ORDER — SUCCINYLCHOLINE/SOD CL,ISO/PF 100 MG/5ML
SYRINGE (ML) INTRAVENOUS AS NEEDED
Status: DISCONTINUED | OUTPATIENT
Start: 2024-04-24 | End: 2024-04-24

## 2024-04-24 RX ORDER — ONDANSETRON 2 MG/ML
INJECTION INTRAMUSCULAR; INTRAVENOUS AS NEEDED
Status: DISCONTINUED | OUTPATIENT
Start: 2024-04-24 | End: 2024-04-24

## 2024-04-24 RX ORDER — CALCIUM CARBONATE 500 MG/1
1000 TABLET, CHEWABLE ORAL DAILY PRN
Status: DISCONTINUED | OUTPATIENT
Start: 2024-04-24 | End: 2024-04-26 | Stop reason: HOSPADM

## 2024-04-24 RX ORDER — POLYETHYLENE GLYCOL 3350 17 G/17G
17 POWDER, FOR SOLUTION ORAL DAILY
Status: DISCONTINUED | OUTPATIENT
Start: 2024-04-25 | End: 2024-04-26 | Stop reason: HOSPADM

## 2024-04-24 RX ORDER — LIDOCAINE HYDROCHLORIDE 10 MG/ML
0.5 INJECTION, SOLUTION EPIDURAL; INFILTRATION; INTRACAUDAL; PERINEURAL ONCE AS NEEDED
Status: DISCONTINUED | OUTPATIENT
Start: 2024-04-24 | End: 2024-04-24 | Stop reason: HOSPADM

## 2024-04-24 RX ORDER — SODIUM CHLORIDE 9 MG/ML
75 INJECTION, SOLUTION INTRAVENOUS CONTINUOUS
Status: DISCONTINUED | OUTPATIENT
Start: 2024-04-24 | End: 2024-04-25

## 2024-04-24 RX ORDER — SENNOSIDES 8.6 MG
1 TABLET ORAL DAILY
Status: DISCONTINUED | OUTPATIENT
Start: 2024-04-25 | End: 2024-04-26 | Stop reason: HOSPADM

## 2024-04-24 RX ORDER — CHLORHEXIDINE GLUCONATE ORAL RINSE 1.2 MG/ML
15 SOLUTION DENTAL ONCE
Status: COMPLETED | OUTPATIENT
Start: 2024-04-24 | End: 2024-04-24

## 2024-04-24 RX ORDER — FENTANYL CITRATE 50 UG/ML
INJECTION, SOLUTION INTRAMUSCULAR; INTRAVENOUS AS NEEDED
Status: DISCONTINUED | OUTPATIENT
Start: 2024-04-24 | End: 2024-04-24

## 2024-04-24 RX ORDER — ONDANSETRON 2 MG/ML
4 INJECTION INTRAMUSCULAR; INTRAVENOUS EVERY 6 HOURS PRN
Status: DISCONTINUED | OUTPATIENT
Start: 2024-04-24 | End: 2024-04-26 | Stop reason: HOSPADM

## 2024-04-24 RX ORDER — HYDROMORPHONE HCL IN WATER/PF 6 MG/30 ML
0.2 PATIENT CONTROLLED ANALGESIA SYRINGE INTRAVENOUS EVERY 2 HOUR PRN
Status: DISCONTINUED | OUTPATIENT
Start: 2024-04-24 | End: 2024-04-25

## 2024-04-24 RX ORDER — DOCUSATE SODIUM 100 MG/1
100 CAPSULE, LIQUID FILLED ORAL 2 TIMES DAILY
Status: DISCONTINUED | OUTPATIENT
Start: 2024-04-24 | End: 2024-04-26 | Stop reason: HOSPADM

## 2024-04-24 RX ORDER — PROPOFOL 10 MG/ML
INJECTION, EMULSION INTRAVENOUS AS NEEDED
Status: DISCONTINUED | OUTPATIENT
Start: 2024-04-24 | End: 2024-04-24

## 2024-04-24 RX ADMIN — CHLORHEXIDINE GLUCONATE 15 ML: 1.2 SOLUTION ORAL at 12:44

## 2024-04-24 RX ADMIN — SODIUM CHLORIDE 12 MCG: 9 INJECTION, SOLUTION INTRAVENOUS at 16:18

## 2024-04-24 RX ADMIN — SODIUM CHLORIDE 0.2 MCG/KG/MIN: 9 INJECTION, SOLUTION INTRAVENOUS at 15:39

## 2024-04-24 RX ADMIN — SODIUM CHLORIDE 75 ML/HR: 0.9 INJECTION, SOLUTION INTRAVENOUS at 21:38

## 2024-04-24 RX ADMIN — DOCUSATE SODIUM 100 MG: 100 CAPSULE, LIQUID FILLED ORAL at 21:32

## 2024-04-24 RX ADMIN — ONDANSETRON 4 MG: 2 INJECTION INTRAMUSCULAR; INTRAVENOUS at 17:45

## 2024-04-24 RX ADMIN — CEFAZOLIN SODIUM 1000 MG: 1 SOLUTION INTRAVENOUS at 21:32

## 2024-04-24 RX ADMIN — Medication 140 MG: at 15:18

## 2024-04-24 RX ADMIN — DEXAMETHASONE SODIUM PHOSPHATE 10 MG: 10 INJECTION, SOLUTION INTRAMUSCULAR; INTRAVENOUS at 15:20

## 2024-04-24 RX ADMIN — HYDROMORPHONE HYDROCHLORIDE 0.5 MG: 1 INJECTION, SOLUTION INTRAMUSCULAR; INTRAVENOUS; SUBCUTANEOUS at 17:45

## 2024-04-24 RX ADMIN — Medication 20 MG: at 15:18

## 2024-04-24 RX ADMIN — SODIUM CHLORIDE, SODIUM LACTATE, POTASSIUM CHLORIDE, AND CALCIUM CHLORIDE: .6; .31; .03; .02 INJECTION, SOLUTION INTRAVENOUS at 15:07

## 2024-04-24 RX ADMIN — SODIUM CHLORIDE, SODIUM LACTATE, POTASSIUM CHLORIDE, AND CALCIUM CHLORIDE 125 ML/HR: .6; .31; .03; .02 INJECTION, SOLUTION INTRAVENOUS at 13:01

## 2024-04-24 RX ADMIN — ACETAMINOPHEN 975 MG: 325 TABLET, FILM COATED ORAL at 21:32

## 2024-04-24 RX ADMIN — OXYCODONE HYDROCHLORIDE 5 MG: 5 TABLET ORAL at 21:32

## 2024-04-24 RX ADMIN — Medication 10 MG: at 16:13

## 2024-04-24 RX ADMIN — MIDAZOLAM 2 MG: 1 INJECTION INTRAMUSCULAR; INTRAVENOUS at 15:07

## 2024-04-24 RX ADMIN — PROPOFOL 150 MG: 10 INJECTION, EMULSION INTRAVENOUS at 15:18

## 2024-04-24 RX ADMIN — FENTANYL CITRATE 100 MCG: 50 INJECTION INTRAMUSCULAR; INTRAVENOUS at 15:18

## 2024-04-24 RX ADMIN — FENTANYL CITRATE 50 MCG: 50 INJECTION INTRAMUSCULAR; INTRAVENOUS at 18:33

## 2024-04-24 RX ADMIN — GABAPENTIN 300 MG: 300 CAPSULE ORAL at 21:32

## 2024-04-24 RX ADMIN — CEFAZOLIN 2000 MG: 1 INJECTION, POWDER, FOR SOLUTION INTRAMUSCULAR; INTRAVENOUS at 15:47

## 2024-04-24 RX ADMIN — SODIUM CHLORIDE: 0.9 INJECTION, SOLUTION INTRAVENOUS at 15:31

## 2024-04-24 RX ADMIN — LIDOCAINE HYDROCHLORIDE 50 MG: 10 INJECTION, SOLUTION EPIDURAL; INFILTRATION; INTRACAUDAL; PERINEURAL at 15:18

## 2024-04-24 RX ADMIN — Medication 20 MG: at 17:03

## 2024-04-24 RX ADMIN — PROPOFOL 100 MCG/KG/MIN: 10 INJECTION, EMULSION INTRAVENOUS at 15:39

## 2024-04-24 RX ADMIN — METHOCARBAMOL 750 MG: 750 TABLET ORAL at 21:33

## 2024-04-24 NOTE — ANESTHESIA POSTPROCEDURE EVALUATION
Post-Op Assessment Note    CV Status:  Stable  Pain Score: 0    Pain management: adequate       Mental Status:  Arousable   Hydration Status:  Stable   PONV Controlled:  None   Airway Patency:  Patent     Post Op Vitals Reviewed: Yes    No anethesia notable event occurred.    Staff: Anesthesiologist, CRNA               /79 (04/24/24 1805)    Temp 98.3 °F (36.8 °C) (04/24/24 1805)    Pulse 68 (04/24/24 1805)   Resp 17 (04/24/24 1805)    SpO2 99 % (04/24/24 1805)

## 2024-04-24 NOTE — ANESTHESIA PROCEDURE NOTES
Arterial Line Insertion    Performed by: Kyra Lin CRNA  Authorized by: Nash Conrad MD  Consent: Verbal consent obtained. Written consent obtained.  Risks and benefits: risks, benefits and alternatives were discussed  Consent given by: patient  Patient understanding: patient states understanding of the procedure being performed  Patient consent: the patient's understanding of the procedure matches consent given  Procedure consent: procedure consent matches procedure scheduled  Relevant documents: relevant documents present and verified  Patient identity confirmed: arm band and hospital-assigned identification number  Preparation: Patient was prepped and draped in the usual sterile fashion.  Indications: hemodynamic monitoring  Orientation:  Left  Location: radial artery  Sedation:  Patient sedated: geta.    Procedure Details:  Brian's test normal: yes  Needle gauge: 20  Seldinger technique: Seldinger technique used  Number of attempts: 1 (ultrasound guided)    Post-procedure:  Post-procedure: dressing applied  Waveform: good waveform  Post-procedure CNS: normal  Patient tolerance: Patient tolerated the procedure well with no immediate complications

## 2024-04-24 NOTE — H&P
History and Physical - Neurosurgery   Alejandra Cope 43 y.o. female MRN: [id number]        Assessment:     I examined the patient, reviewed the diagnostic studies, and had a very pleasant and detailed discussion with Alejandra regarding our findings.  We reviewed with the patient in detail the MR study on the computer monitor, and Alejandra was easily able to appreciate with us the degenerative changes/stenosis at C4-5, C5-6.  She also had lesser stenosis at C6-7 as well as some other levels.  At C5-6 she actually had some abutment along her spinal cord, which was her worst level.  Fortunately, on exam the patient's strength was equal and symmetric, and there were no areas of decreased sensation to touch.     The bulk of our discussion today was devoted to treatment options, and we reviewed both surgical and nonsurgical treatment modalities.  She already underwent 6 weeks of physical therapy with minimal improvement.  She had her first epidural injection a few days ago, and she tells me that she is no better, and in fact may be slightly worse.  At this point she is very tired of having to deal with this neck pain.  She states that it is constant and is severely affecting the quality of her life.  She states that she is now prepared to have surgical discussions regarding her neck issues.  I had a very lengthy discussion with her about all of the treatment alternatives.  We talked about surgical and nonsurgical options again.  In the end, the patient was mentally prepared to proceed with surgery having exhausted both physical therapy as well as pain management.  She states that she would be willing to get another epidural injection, but because she is worse, she prefers to have more of a permanent fix as opposed to a temporary solution.     Alejandra Cope has consented to undergo:      1. Right C4-5, C5-6 anterior cervical discectomy 2. C4-5, C5-6 interbody grafting (allograft), C4-6 plating, variation of above surgery.     Surgery  was described to the patient at length in a plain and simple fashion with the use of anatomic models as well as their own diagnostic study.  I reviewed in detail the expected hospitalization and postoperative recovery.  The concept that no guarantee with respect to the results of any operative procedure were discussed with the patient.  We also discussed with Alejandra in a plain and simple fashion reasonable expectations after surgery.  Considering the duration of her symptomatology and neck/radiating arm pain, there simply can be no guarantees that she will have full resolution of her complaints of pain.  The extent of the patient's post-operative pain is one thing that we can never predict, and whether or not any such lingering pain will allow the patient to return to their current line of work.  While we are hopeful that Alejandra will improve in light of her significant degenerative changes  and cervical stenosis, it is not unreasonable for patient's to have lingering neck discomfort along the back of the neck postoperatively.  In addition, the patient might also have continued discomfort in the arms/hands and if the nerves are already injured, the pain they are having might not improve fully.  To this end we discussed postoperative pain management, namely narcotic use.  We informed the patient that we will manage their pain medications during the expected postoperative period with minimal to no use of narcotics, if possible.  In the event the patient's pain is not well controlled beyond this period, the patient will need to be referred to pain management or their primary care physician, as long-term management is beyond the scope of our practice. Alejandra vocalized her understanding of these fine points, and appreciates reasonable expectations after surgery.       I reviewed with the patient the risks, indications and benefits of surgery at length. Risks of surgery were reviewed and included, but are not limited to, death,  paralysis, infection, CSF leak requiring additional surgery or procedures, bleeding, stroke, phlebitis, pulmonary emboli, anesthetic complications, pneumonia, heart attack or other medical complications during or after surgery, bladder and anal sphincter dysfunction, sexual dysfunction, the fact that the results of surgery can never be guaranteed, pseudoarthrosis, implant or hardware complications, the need for further surgeries, complications with incision healing, temporary or permanent swallowing difficulty, temporary or permanent hoarseness and/or weak voice, acceleration or development of adjacent level degenerative disease, and other unforeseen complications. I also explained to her that she may ultimately require surgery at other levels as well in the future should they continue to worsen as this surgery does not prevent those levels from worsening, but only addresses the worse levels at this point. In a small percentage of patients their pain does not improve with surgery, and an even smaller percentage of patients have worsening pain. Furthermore, Alejandra has been symptomatic for many months (since before Thanksgiving), and we again advised the patient in a plain and simple fashion that we cannot guarantee full resolution of their complaints of pain. In fact, reasonable expectations after surgery would include some sort of lingering posterior neck pain. Indications for surgery include radiographic evidence of cervical stenosis worse at C4-5 and C5-6 and severe neck and radiating arm pain and neck stiffness. Benefits of surgery include the hopeful improvement and resolution of the patient's complaints of pain and neck stiffness. All of the patient's questions were answered to her satisfaction, and in this fashion informed consent for the proposed operative procedure was obtained. Alejandra Cope has signed the consent for procedure. Should the patient have any further questions or concerns, she knows to give me a  call at the office.     I will order our office's standard pre-operative labs and review them before the patient is taken to the operating room.  We will refer the patient back to their primary care physician for pre-operative clearance in light of her hypertension and other comorbidites.   I expect this patient will require inpatient stay 1-2 days.     It was our pleasure seeing Alejandra Cope in the office today, and we look forward to continuing her care during their hospitalization and post-operative recovery.  Should Alejandra have any further questions or concerns, she knows that she is always free to give me a call at the office.  The patient expressed her gratitude to us for our detailed assessment and explanation of our findings, and was in agreement with this treatment plan.    Expected postoperative course, including activity restrictions, expected pain and postoperative medication were reviewed.     Patient provided verbal consent to surgical procedure and signed consent form: Yes     History, physical examination and diagnostic tests were reviewed and questions answered. Diagnosis, care plan and treatment options were discussed. The patient understand instructions and will follow up as directed.     Plan:     Follow-up: Patient will have surgery at [facility] at some point in April to include:     1. Right C4-5, C5-6 anterior cervical discectomy 2. C4-5, C5-6 interbody grafting (allograft), C4-6 plating, variation of above surgery.     She will be referred back to her primary care physician to get preop medical clearance.     We will obtain our routine preoperative laboratory tests.     We we will work with her on solidifying a date, but our tentative plan is early April.  She still needs preauthorization for surgery for her insurance company.  She also needs the preoperative medical clearance, as described above.     I informed her to stay away from nonsteroidal anti-inflammatory medications.  I  advised her to only take Tylenol beginning 7 days prior to surgery.  This helps control bleeding.     Problem List Items Addressed This Visit                  Nervous and Auditory     Cervical radiculopathy     Relevant Orders     Case request operating room: 1. Right C4-5, C5-6 anterior cervical discectomy  2. C4-5, C5-6 interbody grafting (allograft-cornerstone bone) 3. C4-6 plating 4. Variation of above surgery (Completed)         Subjective/Objective      Chief Complaint     Neck pain, radiating arm pain, neck stiffness     HPI     This is a pleasant 43-year-old female who began experiencing neck pain, radiating arm pain, as well as neck stiffness that started before Thanksgiving of 2023.  She denies any traumatic event, fall, accident, or other.  She denies any bowel or bladder.  She underwent 6 weeks of physical therapy before she could obtain an MRI study.  The physical therapy did not help with her pain that much.  She continues to have diminished quality of life secondary to this fairly significant neck pain and muscle spasms.  She also has pain that radiates down her arms.  She did undergo an epidural steroid injection a few days ago.  She was very impressed with the pain management team, but she says that the pain did not improve.  Fact, she states that she feels worse at times since the shot.  I explained to her that sometimes this may take some time to take effect.  However, she is here today to have potential surgical discussions.     Family History     Family History         Family History   Problem Relation Age of Onset    Venous thrombosis Mother           ACUTE; OF DEEP VESSELS OF THE DISTAL LOWER EXTREMITY    COPD Mother      Heart disease Mother      Hypertension Mother      Mental illness Mother      Anxiety disorder Mother      OCD Mother      Colon cancer Father 59    Alcohol abuse Father      No Known Problems Daughter      Kidney disease Maternal Grandmother      No Known Problems Maternal  Grandfather      Breast cancer Paternal Grandmother 40    Diabetes Paternal Grandfather           MELLITUS    No Known Problems Son      No Known Problems Maternal Aunt      No Known Problems Maternal Aunt              Social History     Social History               Socioeconomic History    Marital status: /Civil Union       Spouse name: Not on file    Number of children: Not on file    Years of education: Not on file    Highest education level: Not on file   Occupational History    Occupation: employed   Tobacco Use    Smoking status: Former       Types: Cigars       Passive exposure: Current (rare exposure when drinking)    Smokeless tobacco: Never   Vaping Use    Vaping status: Never Used   Substance and Sexual Activity    Alcohol use: Yes       Comment: socially    Drug use: No    Sexual activity: Yes       Partners: Male       Birth control/protection: None   Other Topics Concern    Not on file   Social History Narrative     CAFFEINE USE          Employed Full Time- [employer] -       Social Determinants of Health      Financial Resource Strain: Not on file   Food Insecurity: Not on file   Transportation Needs: Not on file   Physical Activity: Not on file   Stress: Not on file   Social Connections: Not on file   Intimate Partner Violence: Not on file   Housing Stability: Not on file            Past Medical History     Medical History        Past Medical History:   Diagnosis Date    Anxiety      Asthma      Class 3 severe obesity due to excess calories without serious comorbidity with body mass index (BMI) of 40.0 to 44.9 in adult (HCC) 2022    Complete spontaneous  without complication       FIVE TIMES    Concussion      Depression      Headache      Hypertension      Insomnia      Normal delivery       DAUGHTER    Normal delivery       SON    Pelvic inflammatory disease       RESOLVED: 18    Post concussion syndrome 2019    Recurrent pregnancy loss (CODE)       Thyroid nodule 03/02/2019            Surgical History     Surgical History[]Expand by Default         Past Surgical History:   Procedure Laterality Date    CHOLECYSTECTOMY        CYSTOSCOPY N/A 12/28/2016     Procedure: CYSTOSCOPY;  Surgeon: Kamryn Bloom MD;  Location: [facility];  Service:     HYSTERECTOMY        LAPAROSCOPIC CHOLECYSTECTOMY         RESOLVED: 2006    NASAL SEPTUM SURGERY         REESOLVED: 1999; DEVIATION    WI LAPAROSCOPY W TOTAL HYSTERECTOMY UTERUS 250 GM/< N/A 12/28/2016     Procedure: HYSTERECTOMY LAPAROSCOPIC TOTAL ;  Surgeon: Kamryn Bloom MD;  Location: [facility];  Service: Gynecology    SALPINGECTOMY Bilateral 12/28/2016     Procedure: SALPINGECTOMY;  Surgeon: Kamryn Bloom MD;  Location: [facility];  Service:     TONSILLECTOMY         RESOLVED: 1997    TUBAL LIGATION         RESOLVED: 1/22/14            Medications        Current Outpatient Medications:     albuterol (PROVENTIL HFA,VENTOLIN HFA) 90 mcg/act inhaler, INHALE 2 PUFFS EVERY 6 HOURS AS NEEDED FOR WHEEZING, Disp: 18 g, Rfl: 2    amitriptyline (ELAVIL) 25 mg tablet, TAKE 1 TABLET BY MOUTH DAILY AT BEDTIME, Disp: 90 tablet, Rfl: 1    buPROPion (WELLBUTRIN XL) 150 mg 24 hr tablet, Take 1 tablet (150 mg total) by mouth every morning, Disp: 30 tablet, Rfl: 2    famotidine (PEPCID) 40 MG tablet, TAKE 1 TABLET BY MOUTH DAILY AT BEDTIME, Disp: 90 tablet, Rfl: 1    gabapentin (NEURONTIN) 300 mg capsule, Take 1 capsule (300 mg total) by mouth 3 (three) times a day, Disp: 90 capsule, Rfl: 2    lisinopril (ZESTRIL) 30 mg tablet, Take 1 tablet (30 mg total) by mouth daily, Disp: 90 tablet, Rfl: 1    methocarbamol (ROBAXIN) 500 mg tablet, TAKE 1 TABLET UP TO 3 TIMES A DAY AS NEEDED, Disp: 90 tablet, Rfl: 0    omeprazole (PriLOSEC) 20 mg delayed release capsule, TAKE 1 CAPSULE BY MOUTH EVERY DAY, Disp: 90 capsule, Rfl: 1    phentermine 37.5 MG capsule, Take 1 capsule (37.5 mg total) by mouth every morning, Disp: 30 capsule, Rfl: 2  No current  facility-administered medications for this visit.     Facility-Administered Medications Ordered in Other Visits:     hydrocortisone (ANUSOL-HC) 2.5 % rectal cream, , Topical, 4x Daily PRN, Jacob Casper MD     Allergies     No Known Allergies     The following portions of the patient's history were reviewed and updated as appropriate: allergies, current medications, past family history, past medical history, past social history, past surgical history, and problem list.     Investigations     I personally reviewed the MRI cervical spine-2/10/24 results with the patient:     Personal interpretation.      The worst levels seem to be at C4-5 and C5-6 where there is some abutment of the cord. The C5-6 level is worse than the C4-5 level. There is also some stenosis and findings at other levels as well, but these two seem to be the worst.     Radiology reports  C2-C3: Tiny central disc protrusion without stenosis.     C3-C4: Right paracentral disc protrusion without stenosis.     C4-C5: Right paracentral disc osteophyte complex resulting in slight cord indentation without myelomalacia. Facet hypertrophy noted bilaterally. Mild bilateral foraminal stenosis at this level.     C5-C6: Disc osteophyte complex with left greater than right uncovertebral hypertrophy. There is slight ventral cord indentation without myelomalacia. Facet arthrosis noted. There is moderate left and mild right foraminal narrowing. Correlate clinically   for left greater than right C6 radiculopathy.     C6-C7: Right paracentral disc protrusion resulting in slight thecal sac indentation. Foramina remain patent at this level.     C7-T1:  Normal.     UPPER THORACIC DISC SPACES:  Normal.     OTHER FINDINGS:  None.     IMPRESSION:  Mild to moderate spondylotic changes of the cervical spine greater than expected for patient age. Recommend surgical consultation.     The study was marked in EPIC for significant notification.        Workstation performed:  "[id number]     Review of Systems   Constitutional:  Positive for activity change (difficult to perform activities or stand for long periods of time, pain interrupts some non-work activities) and fatigue.   HENT: Negative.  Negative for sinus pressure.    Eyes:  Positive for visual disturbance (only w/ dizziness which is not often).   Respiratory: Negative.     Cardiovascular: Negative.    Gastrointestinal: Negative.    Endocrine: Negative.    Genitourinary: Negative.    Musculoskeletal:  Positive for neck pain (radiates down back and left arm, daily pain). Negative for arthralgias and myalgias.   Skin: Negative.    Allergic/Immunologic: Negative.    Neurological:  Positive for dizziness, weakness (due to pain, not as strong as her previous \"normal\"), light-headedness (occasional, due to pain) and headaches (occasional when pain is severe). Negative for tremors, seizures and syncope.   Hematological: Negative.    Psychiatric/Behavioral:  Positive for sleep disturbance (due to pain). Negative for behavioral problems, confusion and decreased concentration.    Physical Exam     Vitals:  Blood pressure 160/88, pulse 80, temperature 98.1 °F (36.7 °C), temperature source Temporal, resp. rate 16, height 5' 4\" (1.626 m), weight 115 kg (253 lb), last menstrual period 12/01/2016, SpO2 99%, not currently breastfeeding.,Body mass index is 43.43 kg/m².     Physical Exam        General appearance: alert, appears stated age, cooperative and no distress  Head: Normocephalic, without obvious abnormality, atraumatic  Eyes: EOMI, PERRL  Neck: supple, symmetrical, trachea midline and NT  Back: no kyphosis present, no tenderness to percussion or palpation  Lungs: non labored breathing, equal breath sounds bilaterally without wheezing or crackles  Heart: regular heart rate, normal S1, S2, no murmors  Abdomen: Soft, nontender, nondistended. No pain to palpation  Neurologic:   Mental status: Alert, oriented x 3, thought content " appropriate  Cranial nerves: grossly intact (Cranial nerves II-XII)  Sensory: normal to light touch  Motor: moving all extremities without focal weakness   Reflexes: 1+ and symmetric, but slightly diminished possibly diabetes related  Coordination: finger to nose normal bilaterally, no drift bilaterally     Dilia Lucas MD

## 2024-04-24 NOTE — OP NOTE
OPERATIVE REPORT  PATIENT NAME: Alejandra Cope    :  1980  MRN: [id number]  Pt Location: [facility] OR ROOM 17    SURGERY DATE: 2024    Surgeons and Role:     * Dilia Lucas MD - Primary    Preop Diagnosis:  Cervical radiculopathy [M54.12]    Post-Op Diagnosis Codes:     * Cervical radiculopathy [M54.12]    Procedure(s):  Right -   1. Right C4-5.anterior cervical discectomy    2. Right C5-6 anterior cervical discetomy   3. Bilateral C4-5 and C5-6 foraminotomies  4. C4-5 and C5-6 interbody grafting using 6 mm and 5 mm, respectively. We used cornerstone interbody grafting (allograft-cornerstone bone). Six 14 mm fixed angle screws.  5. C4-6 plating -Applaud Elite  5. Microscopic dissection    Specimen(s):  None    Estimated Blood Loss:   Minimal    Drains:      Anesthesia Type:   General    Operative Indications:  Cervical radiculopathy [M54.12]    Operative Findings:  Thecal sac/nerve roots decompressed without complications    Complications:   None    Clinical Note:    The goals and alternatives to anterior cervical decompression and instrumented fixation were discussed with patient and spouse. Surgery is intended to decompress neural structures and hopefully improve radicular pain, prevent progression myelopathy and stabilize the spine. Weakness, numbness and neck pain are less likely to improve.    The risks of surgery were described in detail.  1. Risk of general anesthetic, with possible cardiac and respiratory complication. There is risk of infection and bleeding. Some insurance companies are restricting the use of synthetic interbody grafts in favor of cadaveric source grafts, which may increase risk of transmission of infection.  2. Risk of neurological injury with new pain, stiffness, paralysis, weakness or numbness or difficulties with bowel and bladder function. The risk of CSF leak was described, as well as the possible need for additional surgeries to correct.   3. Possible need for  revision surgery in the future. Restriction of cervical range of motion.  4. Swallowing difficulties and hoarseness of voice are quite common after surgery. These are generally self-limited. On occasion referral to an ENT surgeon and a feeding tube may be required.  5. Risk of carotid artery injury, stroke, neck hematoma requiring emergent surgery, esophageal tear or injury, were also discussed.    Once all questions were answered to their satisfaction, and asked to proceed with surgery.    Operating Room Note    The patient was brought to the operating room, identified, and transferred onto the OR table. After being placed under general anesthetic, and all appropriate lines were in position, a roll was placed between scapula of the neck. The head was placed in a gel mccann. Care was taken to ensure that all pressure points were padded and the neck was in a neutral position. AP and lateral fluoroscopy were used to check cervical alignment and to plan an appropriate trajectory to the appropriate surgical level. Based on lateral fluoroscopy, an incision was planned along the medial border of the right sternocleidomastoid muscle to midline. The skin was then prepped and draped in usual sterile fashion.    A full surgical timeout was undertaken identifying the site, side and levels of surgery. The patient received preoperative antibiotics. Baseline collection physiological monitoring was obtained. 10 mg of Decadron were administered and I asked that MAPs> 80 mmHg. Protocol for deflating the endotracheal cuff during retractor manipulation was reviewed with anesthesia. The planned incision was infiltrated with 1% lidocaine with 100,000 of epinephrine.    A #10 blade was used to incise the skin. Monopolar cautery was used to dissect down and through the platysma. The platysma was undermined with monopolar cautery. Monopolar cautery was used to expose the medial border of the sternocleidomastoid muscle. A combination of  bipolar cautery, Metzenbaum scissors and blunt dissection was used to further expose to the right at the medial border of the sternocleidomastoid muscle. I could then identify the carotid artery and swept medially to identify the anterior vertebral column. With hand-held retractors in place, the prevertebral fascia was identified, cauterized with bipolar cautery and incised with Metzenbaum scissors. This plane was further developed with combination of blunt and sharp dissection.    Bent spinal needles were placed at the disc space of the appropriate surgical level which was also confirmed on lateral fluoroscopy and with neuroradiology. The disc was permanently marked with a #15 blade prior to removing the bent spinal needle. With hand-held retractors in place, and the longus colli and anterior vertebral bodies exposed, the longus colli muscles were undermined with monopolar cautery. The Theralogix  retraction system was then applied. The endotracheal cuff was deflated as the retractor was being placed.    Once the retractor was placed, and good visualization obtained, the annulus of the C4-5 disc space was cut with a 15 blade. Pituitary rongeurs and curved curettes were used to remove disc material. A matchstick katie was used to remove further disc material and prepare the endplates. The microscope was then brought into the field to complete the microdiscectomy. 1 and 2 mm  Kerrison punches were used to remove the posterior osteophytes. The posterior longitudinal ligament was undermined with a blunt hook and removed with one and 2 mm Kerrison punches. Bilateral foraminotomies were completed. Following decompression, the dura was pulsating nicely and a nerve hook could be passed along the ventral surface of the thecal sac into both foramina without any difficulty. Electrical physiological monitoring remained stable. At this portion of the procedure we then proceeded to perform the same steps for the C5-6 level  as well. Electrical physiological monitoring remained stable during surgery at this additional level as well.     The endplates were further prepared with a matchstick katie and curved curet at all levels. Once the cervical discectomies were completed, the microscope was then removed from the field. A trial was placed under lateral fluoroscopy starting at the C4-5, and did not seem to over distract the facet joints on lateral fluoroscopy. We asked the anesthesiologist to then perform a gentle distraction maneuver on the neck. An interbody graft filled with bone substitute was placed under lateral fluoroscopy with no complications. We placed 6 mm and 5 mm sized grafts, respectively, at the C4-5 and C5-6 levels. Electro-physiological monitoring remained stable during all portions of this surgery. Lateral and AP fluoroscopy confirmed proper placement of the grafts and instrumentation. The grafts were not compromising the central canal in any way. Discectomy with interbody graft were performed for decompression and fusion at C4-5 and C5-6 and electrophysiological monitoring remained stable.    An anterior cervical plate was sized under lateral fluoroscopy. 12 millimeter screws were placed bilaterally at C4, C5, and C6. Final AP and lateral fluoroscopy demonstrated the satisfactory alignment of all instrumentation along the cervical vertebral column. The final locking mechanism was applied to the cervical plate. Electrophysiological monitoring remained stable.    The surgical site was irrigated copiously with antibiotic impregnated irrigation. A prevertebral STEPHANIE drain was not inserted. Vicryl suture was used to approximate the cervical fascia and subcutaneous tissue. A running 4-0 Monocryl was used to approximate the skin edges. A Miplex dressing was applied. There was good hemostasis at the conclusion of the surgery.     The count was correct at the end of the case and there were no complications. Electrophysiological  monitoring was stable. There was good hemostasis prior to skin closure.    The patient was carefully extubated and transferred onto the hospital bed. A cervical collar was applied. The patient was transferred to PACU  and was noted to be hemodynamically and neurologicaly stable. The results of surgery were discussed with the spouse. There were no complications with this surgery. Anesthesia record could be reviewed under a separate report.        SIGNATURE: Dilia Lucas MD  DATE: April 24, 2024  TIME: 2:26 PM

## 2024-04-24 NOTE — ANESTHESIA PREPROCEDURE EVALUATION
Procedure:  1. Right C4-5, C5-6 anterior cervical discectomy  2. C4-5, C5-6 interbody grafting (allograft-cornerstone bone) 3. C4-6 plating 4. Variation of above surgery (Right: Spine Cervical)    Relevant Problems   CARDIO   (+) Essential hypertension   (+) Migraine   (+) Precordial pain      NEURO/PSYCH   (+) Anxiety   (+) Migraine   (+) Numbness and tingling in both hands   (+) Recurrent major depressive disorder, in partial remission (HCC)      PULMONARY   (+) Asthma      Other   (+) Class 3 severe obesity due to excess calories without serious comorbidity with body mass index (BMI) of 40.0 to 44.9 in adult (HCC)        Physical Exam    Airway    Mallampati score: III  TM Distance: >3 FB  Neck ROM: full     Dental    upper dentures,     Cardiovascular  Rhythm: regular, Rate: normal, Cardiovascular exam normal    Pulmonary  Pulmonary exam normal Breath sounds clear to auscultation    Other Findings  post-pubertal.      Anesthesia Plan  ASA Score- 3     Anesthesia Type- general with ASA Monitors.         Additional Monitors: arterial line.    Airway Plan: ETT.    Comment: Risks of general anesthesia discussed including likely possibility of PONV and sore throat, as well as the rare possibilities of aspiration, dental/oropharyngeal/ocular injuries, or grave/life threatening anesthetic and surgical emergencies..       Plan Factors-Exercise tolerance (METS): >4 METS.    Chart reviewed.    Patient summary reviewed.      Patient instructed to abstain from smoking on day of procedure. Patient did not smoke on day of surgery.            Induction- intravenous.    Postoperative Plan- Plan for postoperative opioid use. Planned trial extubation    Informed Consent- Anesthetic plan and risks discussed with patient.  I personally reviewed this patient with the CRNA. Discussed and agreed on the Anesthesia Plan with the CRNA..

## 2024-04-25 LAB
ANION GAP SERPL CALCULATED.3IONS-SCNC: 10 MMOL/L (ref 4–13)
APTT PPP: 25 SECONDS (ref 23–37)
BUN SERPL-MCNC: 13 MG/DL (ref 5–25)
CALCIUM SERPL-MCNC: 8.6 MG/DL (ref 8.4–10.2)
CHLORIDE SERPL-SCNC: 105 MMOL/L (ref 96–108)
CO2 SERPL-SCNC: 23 MMOL/L (ref 21–32)
CREAT SERPL-MCNC: 0.83 MG/DL (ref 0.6–1.3)
ERYTHROCYTE [DISTWIDTH] IN BLOOD BY AUTOMATED COUNT: 11.5 % (ref 11.6–15.1)
GFR SERPL CREATININE-BSD FRML MDRD: 87 ML/MIN/1.73SQ M
GLUCOSE SERPL-MCNC: 133 MG/DL (ref 65–140)
HCT VFR BLD AUTO: 40.8 % (ref 34.8–46.1)
HGB BLD-MCNC: 13.8 G/DL (ref 11.5–15.4)
INR PPP: 0.98 (ref 0.84–1.19)
MCH RBC QN AUTO: 32.1 PG (ref 26.8–34.3)
MCHC RBC AUTO-ENTMCNC: 33.8 G/DL (ref 31.4–37.4)
MCV RBC AUTO: 95 FL (ref 82–98)
PLATELET # BLD AUTO: 304 THOUSANDS/UL (ref 149–390)
PMV BLD AUTO: 10.5 FL (ref 8.9–12.7)
POTASSIUM SERPL-SCNC: 4 MMOL/L (ref 3.5–5.3)
PROTHROMBIN TIME: 12.9 SECONDS (ref 11.6–14.5)
RBC # BLD AUTO: 4.3 MILLION/UL (ref 3.81–5.12)
SODIUM SERPL-SCNC: 138 MMOL/L (ref 135–147)
WBC # BLD AUTO: 14.4 THOUSAND/UL (ref 4.31–10.16)

## 2024-04-25 PROCEDURE — 85610 PROTHROMBIN TIME: CPT | Performed by: PHYSICIAN ASSISTANT

## 2024-04-25 PROCEDURE — 99024 POSTOP FOLLOW-UP VISIT: CPT | Performed by: SPECIALIST

## 2024-04-25 PROCEDURE — 85027 COMPLETE CBC AUTOMATED: CPT | Performed by: PHYSICIAN ASSISTANT

## 2024-04-25 PROCEDURE — 80048 BASIC METABOLIC PNL TOTAL CA: CPT | Performed by: PHYSICIAN ASSISTANT

## 2024-04-25 PROCEDURE — 97166 OT EVAL MOD COMPLEX 45 MIN: CPT

## 2024-04-25 PROCEDURE — 85730 THROMBOPLASTIN TIME PARTIAL: CPT | Performed by: PHYSICIAN ASSISTANT

## 2024-04-25 PROCEDURE — 97163 PT EVAL HIGH COMPLEX 45 MIN: CPT

## 2024-04-25 RX ORDER — HYDROMORPHONE HCL IN WATER/PF 6 MG/30 ML
0.2 PATIENT CONTROLLED ANALGESIA SYRINGE INTRAVENOUS EVERY 8 HOURS PRN
Status: DISCONTINUED | OUTPATIENT
Start: 2024-04-25 | End: 2024-04-26

## 2024-04-25 RX ADMIN — DOCUSATE SODIUM 100 MG: 100 CAPSULE, LIQUID FILLED ORAL at 08:21

## 2024-04-25 RX ADMIN — METHOCARBAMOL 750 MG: 750 TABLET ORAL at 11:37

## 2024-04-25 RX ADMIN — GABAPENTIN 300 MG: 300 CAPSULE ORAL at 21:44

## 2024-04-25 RX ADMIN — POLYETHYLENE GLYCOL 3350 17 G: 17 POWDER, FOR SOLUTION ORAL at 08:21

## 2024-04-25 RX ADMIN — METHOCARBAMOL 750 MG: 750 TABLET ORAL at 17:24

## 2024-04-25 RX ADMIN — ACETAMINOPHEN 975 MG: 325 TABLET, FILM COATED ORAL at 13:42

## 2024-04-25 RX ADMIN — METHOCARBAMOL 750 MG: 750 TABLET ORAL at 21:44

## 2024-04-25 RX ADMIN — HYDROMORPHONE HYDROCHLORIDE 0.2 MG: 0.2 INJECTION, SOLUTION INTRAMUSCULAR; INTRAVENOUS; SUBCUTANEOUS at 21:45

## 2024-04-25 RX ADMIN — CEFAZOLIN SODIUM 1000 MG: 1 SOLUTION INTRAVENOUS at 11:38

## 2024-04-25 RX ADMIN — OXYCODONE HYDROCHLORIDE 5 MG: 5 TABLET ORAL at 01:42

## 2024-04-25 RX ADMIN — CEFAZOLIN SODIUM 1000 MG: 1 SOLUTION INTRAVENOUS at 06:01

## 2024-04-25 RX ADMIN — OXYCODONE HYDROCHLORIDE 5 MG: 5 TABLET ORAL at 18:43

## 2024-04-25 RX ADMIN — GABAPENTIN 300 MG: 300 CAPSULE ORAL at 08:21

## 2024-04-25 RX ADMIN — METHOCARBAMOL 750 MG: 750 TABLET ORAL at 06:00

## 2024-04-25 RX ADMIN — SENNOSIDES 8.6 MG: 8.6 TABLET, FILM COATED ORAL at 08:21

## 2024-04-25 RX ADMIN — PANTOPRAZOLE SODIUM 40 MG: 40 TABLET, DELAYED RELEASE ORAL at 06:00

## 2024-04-25 RX ADMIN — CEFAZOLIN SODIUM 1000 MG: 1 SOLUTION INTRAVENOUS at 21:44

## 2024-04-25 RX ADMIN — ACETAMINOPHEN 975 MG: 325 TABLET, FILM COATED ORAL at 21:44

## 2024-04-25 RX ADMIN — OXYCODONE HYDROCHLORIDE 5 MG: 5 TABLET ORAL at 06:00

## 2024-04-25 RX ADMIN — GABAPENTIN 300 MG: 300 CAPSULE ORAL at 15:04

## 2024-04-25 RX ADMIN — OXYCODONE HYDROCHLORIDE 5 MG: 5 TABLET ORAL at 13:56

## 2024-04-25 RX ADMIN — DOCUSATE SODIUM 100 MG: 100 CAPSULE, LIQUID FILLED ORAL at 17:24

## 2024-04-25 RX ADMIN — ACETAMINOPHEN 975 MG: 325 TABLET, FILM COATED ORAL at 06:00

## 2024-04-25 NOTE — PLAN OF CARE
Problem: PHYSICAL THERAPY ADULT  Goal: Performs mobility at highest level of function for planned discharge setting.  See evaluation for individualized goals.  Description: Treatment/Interventions: LE strengthening/ROM, Functional transfer training, Therapeutic exercise, Endurance training, Bed mobility, Equipment eval/education, Patient/family training, Gait training, Spoke to nursing, Spoke to case management, OT  Equipment Recommended: Walker       See flowsheet documentation for full assessment, interventions and recommendations.  Note: Prognosis: Good  Problem List: Decreased endurance, Impaired balance, Decreased mobility, Decreased strength, Decreased range of motion, Obesity, Orthopedic restrictions, Pain  Assessment: Pt is a 43 y.o. female admitted to [facility] on 2024 with primary medical dx of cervical radiculopathy. S/p C4-C6 ACDF 2024. Pt has the following comorbidities which affect their treatment: active problems listed above,  has a past medical history of Anxiety, Asthma, Class 3 severe obesity due to excess calories without serious comorbidity with body mass index (BMI) of 40.0 to 44.9 in adult (HCC), Complete spontaneous  without complication, Concussion, Depression, Environmental allergies, Headache, Hypertension, Insomnia, Migraine, Normal delivery, Normal delivery, Pelvic inflammatory disease, Post concussion syndrome, Recurrent pregnancy loss (CODE), and Thyroid nodule.  , as well as personal factors including stairs to access home. Pt has a high complexity clinical presentation due to Ongoing medical management for primary dx, Increased reliance on more restrictive AD compared to baseline, Decreased activity tolerance compared to baseline, Fall risk, Increased assistance needed from caregiver at current time, Ongoing telemetry monitoring, Trending lab values, Spinal precautions at current time, Diagnostic imaging pending, Continuous pulse oximetry monitoring , s/p surgical  intervention , and PMH. PT was consulted to evaluate pt's functional mobility and discharge needs. Upon evaluation, patient required S for bed mobility, minAx1 for STS transfers, minAx1 for ambulation. Body system impairments include impaired balance, neck ROM, strength, and endurance, +pain. Pt's functional activity impairments include: activity tolerance and mobility. Participation restrictions include inability to safely return to work at this time. At conclusion of eval, pt remained seated in chair with chair alarm, phone, call bell, and all other personal needs within reach. Pt would benefit from skilled PT to address their functional mobility limitations. The patient's [facility] Basic Mobility Inpatient Short Form Raw Score is 18. A Raw score of greater than 16 suggests the patient may benefit from discharge to home. Please also refer to the recommendation of the Physical Therapist for safe discharge planning.  Barriers to Discharge: Decreased caregiver support, Inaccessible home environment     Rehab Resource Intensity Level, PT: III (Minimum Resource Intensity)    See flowsheet documentation for full assessment.

## 2024-04-25 NOTE — UTILIZATION REVIEW
Initial Clinical Review    Elective inpatient  surgical procedure    Age/Sex: 43 y.o. female  with degenerative changes/stenosis at C4-5, C5-6.  She also had lesser stenosis at C6-7 as well as some other levels.  At C5-6 she actually had some abutment along her spinal cord, which was her worst level. She has neck pain which has not been improved by epidural injection.  Patient is constant and severely affecting her quality of life.          Anesthesia Start Date/Time: 04/24/24 1511         Procedure: 1. Right C4-5, C5-6 anterior cervical discectomy  2. C4-5, C5-6 interbody grafting (allograft-cornerstone bone) 3. C4-6 plating. (Right: Spine Cervical)   Anesthesia type: general   Diagnosis: Cervical radiculopathy [M54.12]   Pre-op diagnosis: Cervical radiculopathy [M54.12]     Procedure(s):  Right -   1. Right C4-5.anterior cervical discectomy    2. Right C5-6 anterior cervical discetomy   3. Bilateral C4-5 and C5-6 foraminotomies  4. C4-5 and C5-6 interbody grafting using 6 mm and 5 mm, respectively. We used cornerstone interbody grafting (allograft-cornerstone bone). Six 14 mm fixed angle screws.  5. C4-6 plating -Medtronic Elite  5. Microscopic dissection    Operative Indications:  Cervical radiculopathy [M54.12]     Operative Findings:  Thecal sac/nerve roots decompressed without complications     Complications:   None    POD#1 Progress Note:     Pain 9/10.  Afebrile.   Continue multimodal pain management with scheduled tylenol, robaxin and gabapentin with prn oxycodone for breakthrough pain.  Treatment plan today includes: iv fluids 75/hr, iv ancef q8 hr, PT/OT evaluations, neuro checks q8 hr, CBC, BMP,  INR.         Admission Orders: Date/Time/Statement:   Admission Orders (From admission, onward)       Ordered        04/24/24 1447  Inpatient Admission  Once                          Orders Placed This Encounter   Procedures    Inpatient Admission     Standing Status:   Standing     Number of Occurrences:    "1     Order Specific Question:   Level of Care     Answer:   Med Surg [16]     Order Specific Question:   Estimated length of stay     Answer:   Inpatient Only Surgery     Vital Signs: /95   Pulse (!) 106   Temp 98.6 °F (37 °C)   Resp 20   Ht 5' 4\" (1.626 m)   Wt 113 kg (250 lb) Comment: 113.4  LMP 12/01/2016   SpO2 93%   BMI 42.91 kg/m²     Date/Time Temp Pulse Resp BP MAP (mmHg) SpO2 Nasal Cannula O2 Flow Rate (L/min) O2 Device   04/25/24 07:23:16 98.6 °F (37 °C) 106   Abnormal  -- 162/95 117 93 % -- --   04/24/24 21:50:14 97.6 °F (36.4 °C) 92 -- 163/103   Abnormal  123 94 % -- None (Room air)   04/24/24 21:49:42 97.6 °F (36.4 °C) 99 -- 163/103   Abnormal  123 94 % -- --   04/24/24 1930 98.6 °F (37 °C) 68 20 149/87 112 98 % 2 L/min Nasal cannula   04/24/24 1900 -- 64 17 151/88 115 99 % 2 L/min Nasal cannula   04/24/24 1845 -- 64 18 142/85 109 98 % 3 L/min Nasal cannula   04/24/24 1830 -- 66 18 151/95 116 94 % -- None (Room air)   04/24/24 1815 -- 70 21 150/82 117 100 % -- None (Room air)   04/24/24 1805 98.3 °F (36.8 °C) 68 17 152/79 116 99 % -- Simple mask   04/24/24 1240 98.2 °F (36.8 °C) 69 16 146/74 -- 96 % -- None (Room air)     Date and Time Eye Opening Best Verbal Response Best Motor Response Clark Coma Scale Score   04/24/24 2200 4 5 6 15   04/24/24 1930 3 5 6 14   04/24/24 1900 3 5 6 14   04/24/24 1845 3 5 6 14   04/24/24 1830 3 5 6 14   04/24/24 1815 3 5 6 14       Pertinent Labs/Diagnostic Test Results:         Results from last 7 days   Lab Units 04/25/24  0442   WBC Thousand/uL 14.40*   HEMOGLOBIN g/dL 13.8   HEMATOCRIT % 40.8   PLATELETS Thousands/uL 304         Results from last 7 days   Lab Units 04/25/24  0442   SODIUM mmol/L 138   POTASSIUM mmol/L 4.0   CHLORIDE mmol/L 105   CO2 mmol/L 23   ANION GAP mmol/L 10   BUN mg/dL 13   CREATININE mg/dL 0.83   EGFR ml/min/1.73sq m 87   CALCIUM mg/dL 8.6         Results from last 7 days   Lab Units 04/24/24  1839   POC GLUCOSE mg/dl 127 "     Results from last 7 days   Lab Units 04/25/24  0442   GLUCOSE RANDOM mg/dL 133       Results from last 7 days   Lab Units 04/25/24  0442   PROTIME seconds 12.9   INR  0.98   PTT seconds 25         Diet: regular  Mobility: ambulatory  DVT Prophylaxis: sequential compression device only     Medications/Pain Control: 9/10  on scheduled tylenol, robaxin, gabapentin, with prn oxycodone.    Scheduled Medications:  acetaminophen, 975 mg, Oral, Q8H JOSEPH  cefazolin, 1,000 mg, Intravenous, Q8H  [START ON 4/26/2024] cephalexin, 500 mg, Oral, Q6H JOSEPH  docusate sodium, 100 mg, Oral, BID  gabapentin, 300 mg, Oral, TID  methocarbamol, 750 mg, Oral, Q6H JOSEPH  pantoprazole, 40 mg, Oral, Early Morning  polyethylene glycol, 17 g, Oral, Daily  senna, 1 tablet, Oral, Daily      Continuous IV Infusions:  sodium chloride, 75 mL/hr, Intravenous, Continuous      PRN Meds:  albuterol, 2 puff, Inhalation, Q6H PRN  calcium carbonate, 1,000 mg, Oral, Daily PRN  HYDROmorphone, 0.2 mg, Intravenous, Q2H PRN  ondansetron, 4 mg, Intravenous, Q6H PRN  oxyCODONE, 2.5 mg, Oral, Q4H PRN   Or  oxyCODONE, 5 mg, Oral, Q4H PRN  phenol, 1 spray, Mouth/Throat, Q2H PRN        Network Utilization Review Department  ATTENTION: Please call with any questions or concerns to [phone] and carefully listen to the prompts so that you are directed to the right person. All voicemails are confidential.   For Discharge needs, contact Care Management DC Support Team at [phone] opt. [phone]  Send all requests for admission clinical reviews, approved or denied determinations and any other requests to dedicated fax number below belonging to the campus where the patient is receiving treatment. List of dedicated fax numbers for the Facilities:  FACILITY NAME UR FAX NUMBER   ADMISSION DENIALS (Administrative/Medical Necessity) [phone]   DISCHARGE SUPPORT TEAM (NETWORK) [phone]   PARENT CHILD HEALTH (Maternity/NICU/Pediatrics) [phone]   [facility]  [facility] [phone]   [facility] [phone]   [facility] [phone]   [facility] [phone]   [facility] [phone]   [facility] [phone]   [facility] [phone]   [facility] [phone]   [facility] [phone]   [facility] [phone]   [facility] [phone]   [facility] [phone]

## 2024-04-25 NOTE — CASE MANAGEMENT
Case Management Assessment    Patient name Alejandra Cope  Location [facility] MRN [id number]  : 1980 Date 2024       Current Admission Date: 2024  Current Admission Diagnosis:Cervical radiculopathy   Patient Active Problem List    Diagnosis Date Noted    Cervical radiculopathy 2024    Dyshidrotic eczema 2023    Class 3 severe obesity due to excess calories without serious comorbidity with body mass index (BMI) of 40.0 to 44.9 in adult (HCC) 2022    Environmental allergies 2022    Migraine 2021    Recurrent major depressive disorder, in partial remission ([facility]) 2021    Orthostatic dizziness 2020    Numbness and tingling in both hands 2020    Thyroid nodule 2019    Precordial pain 2019    Anxiety 2019    Essential hypertension 2018    Cervicalgia 2018    Primary insomnia 2018    Psoriasis 2018    Abnormal uterine bleeding (AUB) 2016    Asthma 2013      LOS (days): 1  Geometric Mean LOS (GMLOS) (days):   Days to GMLOS:     OBJECTIVE:    Risk of Unplanned Readmission Score: 8.62         Current admission status: Inpatient  Referral Reason: Other    Preferred Pharmacy:   [id number] [place] [address] [address]  Phone: [phone] Fax: [phone]    Primary Care Provider: Reba Mckinley PA-C    Primary Insurance: [employer]  Secondary Insurance:     ASSESSMENT:  Active Health Care Proxies    There are no active Health Care Proxies on file.       Advance Directives  Does patient have a Health Care POA?: No  Was patient offered paperwork?: Yes  Does patient have Advance Directives?: No  Was patient offered paperwork?: Yes  Primary Contact: Malachi Cope (Spouse)  [phone] (Mobile)         Readmission Root Cause  30 Day Readmission: No    Patient Information  Admitted from:: Home  Mental Status: Alert  During  Assessment patient was accompanied by: Not accompanied during assessment  Assessment information provided by:: Patient  Primary Caregiver: Self  Support Systems: Self, Spouse/significant other  County of Residence: [place]  What [place] do you live in?: [place] [address]  Home entry access options. Select all that apply.: Stairs  Number of steps to enter home.: 7  Do the steps have railings?: Yes  Type of Current Residence: Apartment  Floor Level: 1  Upon entering residence, is there a bedroom on the main floor (no further steps)?: Yes  Upon entering residence, is there a bathroom on the main floor (no further steps)?: Yes  Living Arrangements: Lives w/ Spouse/significant other  Is patient a ?: No    Activities of Daily Living Prior to Admission  Functional Status: Independent  Completes ADLs independently?: Yes  Ambulates independently?: Yes  Does patient use assisted devices?: No  Does patient currently own DME?: No  Does patient have a history of Outpatient Therapy (PT/OT)?: Yes ([facility])  Does the patient have a history of Short-Term Rehab?: No  Does patient have a history of HHC?: No  Does patient currently have HHC?: No         Patient Information Continued  Income Source: Employed  Does patient have prescription coverage?: Yes  Does patient receive dialysis treatments?: No  Does patient have a history of substance abuse?: No  Does patient have a history of Mental Health Diagnosis?: No         Means of Transportation  Means of Transport to Appts:: Drives Self      Social Determinants of Health (SDOH)      Flowsheet Row Most Recent Value   Housing Stability    In the last 12 months, was there a time when you were not able to pay the mortgage or rent on time? N   In the last 12 months, how many places have you lived? 1   In the last 12 months, was there a time when you did not have a steady place to sleep or slept in a shelter (including now)? N   Transportation Needs    In the past 12 months,  has lack of transportation kept you from medical appointments or from getting medications? no   In the past 12 months, has lack of transportation kept you from meetings, work, or from getting things needed for daily living? No   Food Insecurity    Within the past 12 months, you worried that your food would run out before you got the money to buy more. Never true   Within the past 12 months, the food you bought just didn't last and you didn't have money to get more. Never true   Utilities    In the past 12 months has the electric, gas, oil, or water company threatened to shut off services in your home? No            CM reviewed d/c planning process including the following: identifying help at home, patient preference for d/c planning needs,[facility] Meds to Bed program, availability of treatment team to discuss questions or concerns patient and/or family may have regarding understanding medications and recognizing signs and symptoms once discharged. CM also encouraged patient to follow up with all recommended appointments after discharge. Patient advised of importance for patient and family to participate in managing patient’s medical well being.    CM met with the patient at the bedside. She is independent at baseline. She lives with her spouse, who works fulltime. She does not use any equipment at baseline. She has applied for food stamps to help her with her post hospital needs. CM provided patient with a copy of the [facility] HCP form, with instructions on completing.

## 2024-04-25 NOTE — PROGRESS NOTES
[facility]  Progress Note  Name: Alejandra Cope I  MRN: [id number]  Unit/Bed#: [facility]-01 I Date of Admission: 2024   Date of Service: 2024 I Hospital Day: 1    Assessment/Plan   * Cervical radiculopathy  Assessment & Plan  POD#1 - s/p C4-6 ACDF (Dr. Lucas )   Hx of chronic neck pain with LUE radiculopathy   Pt has been following for Dr. Lucas for this in the office   Presented on  for elective anterior cervical decompression and fusion     Imagin/24 upright cervical x-rays: pending final radiology read   Per my review, expected post-op changed with appropriately placed hardware     Plan:   Continue to closely monitor neuro exam   Frequent neuro checks per primary team   Maintain normotensive BP goals  No drains in place   Continue local wound care to incision   Continue IV ancef x 48hrs post-op followed by keflex PO x 5 days   Labs/Vitals:   Some elevated BP recordings were noted overnight, overall VSS stable. Afebrile. On room air   Post-op labs revealed a mild leukocytosis that is likely reactionary post-op and not unexpected. Otherwise, labs within normal limits.   Pain control:   Continue scheduled   Tylenol 975mg PO q 8hrs   Gabapentin 300mg PO TID   Robaxin 750mg PO q 6hrs   Continue PRN  Oxycodone 2.5mg/5mg PO q 4hrs PRN mod-severe pain   Dilaudid 0.2mg IV q 2hrs PRN breakthrough pain  Will decrease dosing to 8 8hrs PRN and plan to discontinue tomorrow morning   Bowel regimen: senna 1 tablet qd, coalce 100mg BID, miralax qd   Advance as needed   DVT ppx: SCDs, hold chem dvt ppx until cleared by nsgy   Medical management per primary team   Pain control per primary team   PT/OT   Social work following for assistance with dispo once medically cleared     Neurosurgery will continue to follow as primary. Tentative plan for d/c home tomorrow pending progress. Please reach out with any further questions or concerns.            Subjective/Objective  "  Chief Complaint: \"good morning\"     Subjective: Patient seen and examined this a.m. on rounds.  No acute events overnight.  Patient states that she does have some new surgical/incisional pain as well as some muscle tightening and spasming in her neck and shoulders.  She states the pain medications do help this pain.  Patient denies any incisional issues or bleeding.  Patient states she was up and ambulating in the hallway without significant difficulty.  She feels that she felt steady when walking.  Denies any new numbness or weakness.    Objective: Pleasant, obese, middle-aged female sitting up comfortably in the chair.  No acute distress.    I/O         04/23 0701  04/24 0700 04/24 0701 04/25 0700 04/25 0701 04/26 0700    I.V. (mL/kg)  1300 (11.5)     Total Intake(mL/kg)  1300 (11.5)     Urine (mL/kg/hr)  520 775 (2.2)    Total Output  520 775    Net  +780 -775                 Invasive Devices       Peripheral Intravenous Line  Duration             Peripheral IV 04/24/24 Right Antecubital <1 day    Peripheral IV 04/24/24 Right Hand <1 day              Drain  Duration             Urethral Catheter Latex;Straight-tip 16 Fr. <1 day                  Physical Exam:  Vitals: Blood pressure 162/95, pulse (!) 106, temperature 98.6 °F (37 °C), resp. rate 20, height 5' 4\" (1.626 m), weight 113 kg (250 lb), last menstrual period 12/01/2016, SpO2 93%, not currently breastfeeding.,Body mass index is 42.91 kg/m².    General appearance: alert, appears stated age, cooperative and no distress  Head: Normocephalic, without obvious abnormality, atraumatic  Eyes: EOMI, PERRL  Neck: Anterior horizontal incision is clean, dry, intact.  Intact Steri-Strips in place.  No drainage or bleeding noted.  Back: no kyphosis present, no tenderness to percussion or palpation  Lungs: non labored breathing, no respiratory distress on room air  Heart: regular heart rate  Neurologic:   Mental status: Alert, oriented x3, thought content " "appropriate  Cranial nerves: grossly intact (Cranial nerves II-XII)  Sensory: normal to light touch bilaterally throughout  Motor: moving all extremities without focal weakness   Reflexes: 2+ and symmetric  Coordination: No drift bilaterally    Lab Results:  Results from last 7 days   Lab Units 04/25/24  0442   WBC Thousand/uL 14.40*   HEMOGLOBIN g/dL 13.8   HEMATOCRIT % 40.8   PLATELETS Thousands/uL 304     Results from last 7 days   Lab Units 04/25/24  0442   SODIUM mmol/L 138   POTASSIUM mmol/L 4.0   CHLORIDE mmol/L 105   CO2 mmol/L 23   BUN mg/dL 13   CREATININE mg/dL 0.83   CALCIUM mg/dL 8.6             Results from last 7 days   Lab Units 04/25/24  0442   INR  0.98   PTT seconds 25     No results found for: \"TROPONINT\"  ABG:No results found for: \"PHART\", \"QEW2YWY\", \"PO2ART\", \"IWD8RGD\", \"B7CIQFUD\", \"BEART\", \"SOURCE\"    Imaging Studies: I have personally reviewed pertinent reports.   and I have personally reviewed pertinent films in PACS    EKG, Pathology, and Other Studies: I have personally reviewed pertinent reports.      VTE Pharmacologic Prophylaxis: Sequential compression device (Venodyne)     VTE Mechanical Prophylaxis: sequential compression device     "

## 2024-04-25 NOTE — OCCUPATIONAL THERAPY NOTE
"    Occupational Therapy Evaluation     Patient Name: Alejandra Cope  Today's Date: 2024  Problem List  Principal Problem:    Cervical radiculopathy    Past Medical History  Past Medical History:   Diagnosis Date    Anxiety     Asthma     states hasnt needed inhaler \"lately for months\"    Class 3 severe obesity due to excess calories without serious comorbidity with body mass index (BMI) of 40.0 to 44.9 in adult (HCC) 2022    Complete spontaneous  without complication     FIVE TIMES    Concussion     Depression     Environmental allergies     Headache     Hypertension     Insomnia     Migraine     Normal delivery     DAUGHTER    Normal delivery     SON    Pelvic inflammatory disease     RESOLVED: 16    Post concussion syndrome 2019    Recurrent pregnancy loss (CODE)     Thyroid nodule 2019     Past Surgical History  Past Surgical History:   Procedure Laterality Date    CHOLECYSTECTOMY      CYSTOSCOPY N/A 2016    Procedure: CYSTOSCOPY;  Surgeon: Kamryn Bloom MD;  [facility]: [facility];  Service:     HYSTERECTOMY      LAPAROSCOPIC CHOLECYSTECTOMY      RESOLVED:     NASAL SEPTUM SURGERY      REESOLVED: ; DEVIATION    IA LAPAROSCOPY W TOTAL HYSTERECTOMY UTERUS 250 GM/< N/A 2016    Procedure: HYSTERECTOMY LAPAROSCOPIC TOTAL ;  Surgeon: Kamryn Bloom MD;  Location: [facility];  Service: Gynecology    SALPINGECTOMY Bilateral 2016    Procedure: SALPINGECTOMY;  Surgeon: Kamryn Bloom MD;  [facility];  Service:     TONSILLECTOMY      RESOLVED:     TUBAL LIGATION      RESOLVED: 14 08   OT Last Visit   OT Visit Date 24   Note Type   Note type Evaluation   Pain Assessment   Pain Assessment Tool 0-10   Pain Score 6   Pain Location/Orientation Location: Neck   Hospital Pain Intervention(s) Emotional support;Ambulation/increased activity;Repositioned   Restrictions/Precautions   Weight Bearing Precautions Per Order No   Braces or Orthoses (No " brace per Neurosurgery)   Other Precautions Cognitive;Chair Alarm;Bed Alarm;Telemetry;Fall Risk;Pain;Spinal precautions   Home Living   Type of Home Apartment   Home Layout (pt lives in a first floor apartment with [address])   Bathroom Shower/Tub Walk-in shower   Bathroom Toilet Standard   Bathroom Equipment (NO DME at baseline)   Bathroom Accessibility Accessible   Prior Function   Level of [place] Independent with ADLs;Independent with IADLS   Lives With Spouse   Receives Help From Family   IADLs Independent with driving;Independent with meal prep;Independent with medication management   Falls in the last 6 months 0   Vocational Full time employment   Lifestyle   Autonomy I with ADL's/IADL's, no AD with functional mobility +drives   Reciprocal Relationships spouse   Service to Others 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   Intrinsic Gratification work   General   Family/Caregiver Present No   ADL   Eating Assistance 7  Independent   Grooming Assistance 7  Independent   UB Bathing Assistance 5  Supervision/Setup   LB Bathing Assistance 4  Minimal Assistance   UB Dressing Assistance 5  Supervision/Setup   LB Dressing Assistance 4  Minimal Assistance   LB Dressing Deficit Steadying  (S with donning/doffing socks with crossed over leg method right with increased time.)   Toileting Assistance  4  Minimal Assistance   Bed Mobility   Supine to Sit 5  Supervision   Additional items Assist x 1;HOB elevated   Transfers   Sit to Stand 4  Minimal assistance   Additional items Assist x 1   Stand to Sit 4  Minimal assistance   Additional items Assist x 1   Additional Comments +RW   Functional Mobility   Functional Mobility 4  Minimal assistance   Additional Comments min a for CG with RW short household distance functional mobility, no overt LOB, requiring increased time   Additional items Rolling walker   Balance   Static Sitting Good   Dynamic Sitting Fair +   Static Standing Fair   Dynamic Standing Fair -    Ambulatory Poor +   Activity Tolerance   Activity Tolerance Patient limited by fatigue;Patient limited by pain   Medical Staff Made Aware PT Krzysztof due to the patient's co-morbidities, clinically unstable presentation, and present impairments which are a regression from the patient's baseline.    Nurse Made Aware RN cleared pt for therapy   RUE Assessment   RUE Assessment WFL   LUE Assessment   LUE Assessment WFL   Hand Function   Gross Motor Coordination Functional   Fine Motor Coordination Functional   Sensation   Light Touch No apparent deficits   Vision-Basic Assessment   Current Vision No visual deficits   Vision - Complex Assessment   Acuity Able to read clock/calendar on wall without difficulty   Perception   Inattention/Neglect Appears intact   Cognition   Overall Cognitive Status WFL   Arousal/Participation Alert;Responsive;Arousable   Attention Within functional limits   Orientation Level Oriented X4   Memory Within functional limits   Following Commands Follows all commands and directions without difficulty   Comments pt pleasant and cooperative, good cognition with evaluation.   Assessment   Prognosis Good   Assessment Pt is a 43 y.o. female who was [facility] to [facility] on 2024 with Cervical radiculopathy POD#1 - s/p C4-6 ACDF (Dr. Lucas )  . Patient  has a past medical history of Anxiety, Asthma, Class 3 severe obesity due to excess calories without serious comorbidity with body mass index (BMI) of 40.0 to 44.9 in adult (HCC) (2022), Complete spontaneous  without complication, Concussion, Depression, Environmental allergies, Headache, Hypertension, Insomnia, Migraine, Normal delivery, Normal delivery, Pelvic inflammatory disease, Post concussion syndrome (2019), Recurrent pregnancy loss (CODE), and Thyroid nodule (2019).  At baseline pt was completing I with ADL's/IaDL's, no AD with functional mobility +drives. Pt lives with spouse in a apartment with  [place]. Currently pt requires min a for overall ADLS and min a with RW for functional mobility/transfers. Pt currently presents with impairments in the following categories -steps to enter environment, difficulty performing ADLS, and difficulty performing IADLS  activity tolerance, endurance, standing balance/tolerance, and sitting balance/tolerance. These impairments, as well as pt's fatigue, pain, spinal precautions, and risk for falls  limit pt's ability to safely engage in all baseline areas of occupation, includinggrooming, bathing, dressing, toileting, functional mobility/transfers, community mobility, laundry , driving, house maintenance, medication management, meal prep, cleaning, and work/volunteer work  From OT standpoint, recommendation no needs upon D/C. OT will continue to follow to address the below stated goals.   Goals   Patient Goals less pain   Discharge Recommendation   Rehab Resource Intensity Level, OT No post-acute rehabilitation needs   Equipment Recommended Bedside commode;Shower/Tub chair with back ($)  (review DME next session)   Commode Type Standard   AM-PAC Daily Activity Inpatient   Lower Body Dressing 3   Bathing 3   Toileting 3   Upper Body Dressing 3   Grooming 3   Eating 4   Daily Activity Raw Score 19   Daily Activity Standardized Score (Calc for Raw Score >=11) 40.22   AM-PAC Applied Cognition Inpatient   Following a Speech/Presentation 3   Understanding Ordinary Conversation 4   Taking Medications 4   Remembering Where Things Are Placed or Put Away 4   Remembering List of 4-5 Errands 4   Taking Care of Complicated Tasks 4   Applied Cognition Raw Score 23   Applied Cognition Standardized Score 53.08   End of Consult   Patient Position at End of Consult All needs within reach;Supine;Bed/Chair alarm activated   Nurse Communication Nurse aware of consult       Occupational Therapy Goals:    *Mod I with bed mobility to engage in functional tasks.  *Mod I Adl's after setup with use of AE  PRN  *Mod I toileting and clothing management   *Mod I functional mobility and transfers to/from all surfaces with Fair + dynamic balance and safety for participation in dynamic adls and iadl tasks   *Demonstrate good carryover with safe use of RW during functional tasks   *Assess DME needs   *Increase activity tolerance to 25-30 minutes for participation in adls and enjoyable activities  *Pt to participate in further cognitive testing with good attention and participation to assist with safe d/c recommendations  *Mod I with Simulated IADL management task.  *Demonstrate good carryover of pt/family education and training with good tolerance for increased safety and independence with ADL's/ADl's.  *Pt will improve standing balance to 4-5 minutes with functional tasks to increase I with toileting/transfers.  *Patient will demonstrate 100% carryover of energy conservation techniques t/o functional I/ADL/leisure tasks w/o cues s/p skilled education to increase endurance during functional tasks    Eve Walters OTR/L

## 2024-04-25 NOTE — PHYSICAL THERAPY NOTE
"                                                      Physical Therapy Evaluation    Patient Name: Alejandra Cope    Today's Date: 2024     Problem List  Principal Problem:    Cervical radiculopathy       Past Medical History  Past Medical History:   Diagnosis Date    Anxiety     Asthma     states hasnt needed inhaler \"lately for months\"    Class 3 severe obesity due to excess calories without serious comorbidity with body mass index (BMI) of 40.0 to 44.9 in adult (HCC) 2022    Complete spontaneous  without complication     FIVE TIMES    Concussion     Depression     Environmental allergies     Headache     Hypertension     Insomnia     Migraine     Normal delivery     DAUGHTER    Normal delivery     SON    Pelvic inflammatory disease     RESOLVED: 16    Post concussion syndrome 2019    Recurrent pregnancy loss (CODE)     Thyroid nodule 2019        Past Surgical History  Past Surgical History:   Procedure Laterality Date    CHOLECYSTECTOMY      CYSTOSCOPY N/A 2016    Procedure: CYSTOSCOPY;  Surgeon: Kamryn Bloom MD;  Location: [facility];  Service:     HYSTERECTOMY      LAPAROSCOPIC CHOLECYSTECTOMY      RESOLVED:     NASAL SEPTUM SURGERY      REESOLVED: ; DEVIATION    WI ARTHRD ANT INTERBODY DECOMPRESS CERVICAL BELW C2 Right 2024    Procedure: 1. Right C4-5, C5-6 anterior cervical discectomy  2. C4-5, C5-6 interbody grafting (allograft-cornerstone bone) 3. C4-6 plating.;  Surgeon: Dilia Lucas MD;  [facility]: [facility];  Service: Neurosurgery    WI LAPAROSCOPY W TOTAL HYSTERECTOMY UTERUS 250 GM/< N/A 2016    Procedure: HYSTERECTOMY LAPAROSCOPIC TOTAL ;  Surgeon: Kamryn Bloom MD;  [facility];  Service: Gynecology    SALPINGECTOMY Bilateral 2016    Procedure: SALPINGECTOMY;  Surgeon: Kamryn Bloom MD;  [facility];  Service:     TONSILLECTOMY      RESOLVED:     TUBAL LIGATION      RESOLVED: 14 0843   PT " Last Visit   PT Visit Date 04/25/24   Note Type   Note type Evaluation   Pain Assessment   Pain Assessment Tool 0-10   Pain Score 6   Pain Location/Orientation Location: Neck   Restrictions/Precautions   Weight Bearing Precautions Per Order No   Braces or Orthoses   (no brace per neurosx)   Other Precautions Chair Alarm;Bed Alarm;Multiple lines;Telemetry;Fall Risk;Spinal precautions   Home Living   Type of Home Apartment   Home Layout Stairs to enter with rails  (7 steps (curb steps+consecutive))   Bathroom Shower/Tub Walk-in shower   Bathroom Toilet Standard   Prior Function   Level of [place] Independent with ADLs;Independent with functional mobility;Independent with IADLS   Lives With Spouse   Receives Help From Family   IADLs Independent with driving;Independent with meal prep;Independent with medication management   Falls in the last 6 months 0   Vocational Full time employment   General   Family/Caregiver Present No   Cognition   Overall Cognitive Status WFL   Arousal/Participation Cooperative   Attention Attends with cues to redirect   Orientation Level Oriented X4   Memory Within functional limits   Following Commands Follows all commands and directions without difficulty   RLE Assessment   RLE Assessment WFL   LLE Assessment   LLE Assessment WFL   Light Touch   RLE Light Touch Grossly intact   LLE Light Touch Grossly intact   Proprioception   RLE Proprioception Grossly intact   LLE Proprioception Grossly Intact   Bed Mobility   Supine to Sit 5  Supervision   Additional items HOB elevated;Increased time required;Verbal cues   Sit to Supine Unable to assess   Transfers   Sit to Stand 4  Minimal assistance   Additional items Assist x 1;Increased time required;Verbal cues   Stand to Sit 4  Minimal assistance   Additional items Assist x 1;Increased time required;Verbal cues   Stand pivot 4  Minimal assistance   Additional items Assist x 1;Increased time required;Verbal cues   Ambulation/Elevation   Gait  pattern Improper Weight shift;Decreased foot clearance;Short stride;Excessively slow   Gait Assistance 4  Minimal assist   Additional items Assist x 1;Verbal cues  (CGA)   Assistive Device Rolling walker   Distance 80'   Balance   Static Sitting Good   Dynamic Sitting Fair +   Static Standing Fair   Dynamic Standing Poor +   Ambulatory Poor +   Endurance Deficit   Endurance Deficit Yes   Endurance Deficit Description fatigue   Activity Tolerance   Activity Tolerance Patient limited by fatigue   Medical Staff Made Aware OT BRENDA   Nurse Made Aware yes-cleared   Assessment   Prognosis Good   Problem List Decreased endurance;Impaired balance;Decreased mobility;Decreased strength;Decreased range of motion;Obesity;Orthopedic restrictions;Pain   Assessment Pt is a 43 y.o. female admitted to [facility] on 2024 with primary medical dx of cervical radiculopathy. S/p C4-C6 ACDF 2024. Pt has the following comorbidities which affect their treatment: active problems listed above,  has a past medical history of Anxiety, Asthma, Class 3 severe obesity due to excess calories without serious comorbidity with body mass index (BMI) of 40.0 to 44.9 in adult (HCC), Complete spontaneous  without complication, Concussion, Depression, Environmental allergies, Headache, Hypertension, Insomnia, Migraine, Normal delivery, Normal delivery, Pelvic inflammatory disease, Post concussion syndrome, Recurrent pregnancy loss (CODE), and Thyroid nodule.  , as well as personal factors including stairs to access home. Pt has a high complexity clinical presentation due to Ongoing medical management for primary dx, Increased reliance on more restrictive AD compared to baseline, Decreased activity tolerance compared to baseline, Fall risk, Increased assistance needed from caregiver at current time, Ongoing telemetry monitoring, Trending lab values, Spinal precautions at current time, Diagnostic imaging pending, Continuous pulse oximetry monitoring  , s/p surgical intervention , and PMH. PT was consulted to evaluate pt's functional mobility and discharge needs. Upon evaluation, patient required S for bed mobility, minAx1 for STS transfers, minAx1 for ambulation. Body system impairments include impaired balance, neck ROM, strength, and endurance, +pain. Pt's functional activity impairments include: activity tolerance and mobility. Participation restrictions include inability to safely return to work at this time. At conclusion of eval, pt remained seated in chair with chair alarm, phone, call bell, and all other personal needs within reach. Pt would benefit from skilled PT to address their functional mobility limitations. The patient's AM-[facility] Basic Mobility Inpatient Short Form Raw Score is 18. A Raw score of greater than 16 suggests the patient may benefit from discharge to home. Please also refer to the recommendation of the Physical Therapist for safe discharge planning.   Barriers to Discharge Decreased caregiver support;Inaccessible home environment   Goals   Patient Goals to improve and get back to work   STG Expiration Date 05/09/24   Short Term Goal #1 In 14 days, pt will: 1) perform bed mobility with mod I to promote functional independence and decrease caregiver burden. 2) perform transfers to<>from all surfaces with mod I to promote functional independence and safety. 3) ambulate 200' with mod I and least restrictive device to promote safe return to home. 4) negotiate 7 stairs with mod I to promote safe return to home. 5) improve balance grades by 1/2 to promote safety with functional mobility. 6) improve strength grades by 1/2 to promote safety with functional mobility.   PT Treatment Day 0   Plan   Treatment/Interventions LE strengthening/ROM;Functional transfer training;Therapeutic exercise;Endurance training;Bed mobility;Equipment eval/education;Patient/family training;Gait training;Spoke to nursing;Spoke to case management;OT   PT Frequency  3-5x/wk   Discharge Recommendation   Rehab Resource Intensity Level, PT III (Minimum Resource Intensity)   Equipment Recommended Walker   Walker Package Recommended Wheeled walker   AM-PAC Basic Mobility Inpatient   Turning in Flat Bed Without Bedrails 3   Lying on Back to Sitting on Edge of Flat Bed Without Bedrails 3   Moving Bed to Chair 3   Standing Up From Chair Using Arms 3   Walk in Room 3   Climb 3-5 Stairs With Railing 3   Basic Mobility Inpatient Raw Score 18   Basic Mobility Standardized Score 41.05   [facility] Highest Level Of Mobility   -HLM Goal 6: Walk 10 steps or more   -HLM Achieved 7: Walk 25 feet or more   Modified [place] Scale   Modified Hank Scale 4   Krzysztof Todd, PT, DPT

## 2024-04-25 NOTE — PLAN OF CARE
Problem: OCCUPATIONAL THERAPY ADULT  Goal: Performs self-care activities at highest level of function for planned discharge setting.  See evaluation for individualized goals.  Description:    Equipment Recommended: Bedside commode, Shower/Tub chair with back ($) (review DME next session)       See flowsheet documentation for full assessment, interventions and recommendations.   Note:    Prognosis: Good  Assessment: Pt is a 43 y.o. female who was admitted to [facility] on 2024 with Cervical radiculopathy POD#1 - s/p C4-6 ACDF (Dr. Lucas )  . Patient  has a past medical history of Anxiety, Asthma, Class 3 severe obesity due to excess calories without serious comorbidity with body mass index (BMI) of 40.0 to 44.9 in adult (HCC) (2022), Complete spontaneous  without complication, Concussion, Depression, Environmental allergies, Headache, Hypertension, Insomnia, Migraine, Normal delivery, Normal delivery, Pelvic inflammatory disease, Post concussion syndrome (2019), Recurrent pregnancy loss (CODE), and Thyroid nodule (2019).  At baseline pt was completing I with ADL's/IaDL's, no AD with functional mobility +drives. Pt lives with spouse in a apartment with [facility]. Currently pt requires min a for overall ADLS and min a with RW for functional mobility/transfers. Pt currently presents with impairments in the following categories -steps to enter environment, difficulty performing ADLS, and difficulty performing IADLS  activity tolerance, endurance, standing balance/tolerance, and sitting balance/tolerance. These impairments, as well as pt's fatigue, pain, spinal precautions, and risk for falls  limit pt's ability to safely engage in all baseline areas of occupation, includinggrooming, bathing, dressing, toileting, functional mobility/transfers, community mobility, laundry , driving, house maintenance, medication management, meal prep, cleaning, and work/volunteer work  From OT  standpoint, recommendation no needs upon D/C. OT will continue to follow to address the below stated goals.     Rehab Resource Intensity Level, OT: No post-acute rehabilitation needs

## 2024-04-25 NOTE — ASSESSMENT & PLAN NOTE
POD#1 - s/p C4-6 ACDF (Dr. Lucas )   Hx of chronic neck pain with LUE radiculopathy   Pt has been following for Dr. Lucas for this in the office   Presented on  for elective anterior cervical decompression and fusion     Imagin/24 upright cervical x-rays: pending final radiology read   Per my review, expected post-op changed with appropriately placed hardware     Plan:   Continue to closely monitor neuro exam   Frequent neuro checks per primary team   Maintain normotensive BP goals  No drains in place   Continue local wound care to incision   Continue IV ancef x 48hrs post-op followed by keflex PO x 5 days   Labs/Vitals:   Some elevated BP recordings were noted overnight, overall VSS stable. Afebrile. On room air   Post-op labs revealed a mild leukocytosis that is likely reactionary post-op and not unexpected. Otherwise, labs within normal limits.   Pain control:   Continue scheduled   Tylenol 975mg PO q 8hrs   Gabapentin 300mg PO TID   Robaxin 750mg PO q 6hrs   Continue PRN  Oxycodone 2.5mg/5mg PO q 4hrs PRN mod-severe pain   Dilaudid 0.2mg IV q 2hrs PRN breakthrough pain  Will decrease dosing to 8 8hrs PRN and plan to discontinue tomorrow morning   Bowel regimen: senna 1 tablet qd, coalce 100mg BID, miralax qd   Advance as needed   DVT ppx: SCDs, hold chem dvt ppx until cleared by nsgy   Medical management per primary team   Pain control per primary team   PT/OT   Social work following for assistance with dispo once medically cleared     Neurosurgery will continue to follow as primary. Tentative plan for d/c home tomorrow pending progress. Please reach out with any further questions or concerns.

## 2024-04-26 VITALS
OXYGEN SATURATION: 97 % | TEMPERATURE: 98.9 F | HEIGHT: 64 IN | HEART RATE: 80 BPM | RESPIRATION RATE: 17 BRPM | WEIGHT: 250 LBS | DIASTOLIC BLOOD PRESSURE: 99 MMHG | BODY MASS INDEX: 42.68 KG/M2 | SYSTOLIC BLOOD PRESSURE: 152 MMHG

## 2024-04-26 PROCEDURE — 99024 POSTOP FOLLOW-UP VISIT: CPT | Performed by: SPECIALIST

## 2024-04-26 PROCEDURE — NC001 PR NO CHARGE: Performed by: SPECIALIST

## 2024-04-26 RX ORDER — OXYCODONE HYDROCHLORIDE 5 MG/1
5 TABLET ORAL EVERY 6 HOURS PRN
Qty: 20 TABLET | Refills: 0 | Status: SHIPPED | OUTPATIENT
Start: 2024-04-26

## 2024-04-26 RX ORDER — METHOCARBAMOL 750 MG/1
750 TABLET, FILM COATED ORAL EVERY 6 HOURS PRN
Qty: 36 TABLET | Refills: 0 | Status: SHIPPED | OUTPATIENT
Start: 2024-04-26

## 2024-04-26 RX ORDER — METHYLPREDNISOLONE 4 MG/1
8 TABLET ORAL DAILY
Status: DISCONTINUED | OUTPATIENT
Start: 2024-04-30 | End: 2024-04-26 | Stop reason: HOSPADM

## 2024-04-26 RX ORDER — METHYLPREDNISOLONE 4 MG/1
4 TABLET ORAL DAILY
Status: DISCONTINUED | OUTPATIENT
Start: 2024-05-01 | End: 2024-04-26 | Stop reason: HOSPADM

## 2024-04-26 RX ORDER — METHYLPREDNISOLONE 4 MG/1
16 TABLET ORAL DAILY
Status: DISCONTINUED | OUTPATIENT
Start: 2024-04-28 | End: 2024-04-26 | Stop reason: HOSPADM

## 2024-04-26 RX ORDER — DOCUSATE SODIUM 100 MG/1
100 CAPSULE, LIQUID FILLED ORAL 2 TIMES DAILY
Qty: 14 CAPSULE | Refills: 0 | Status: SHIPPED | OUTPATIENT
Start: 2024-04-26 | End: 2024-05-03

## 2024-04-26 RX ORDER — POLYETHYLENE GLYCOL 3350 17 G/17G
17 POWDER, FOR SOLUTION ORAL DAILY
Qty: 119 G | Refills: 0 | Status: SHIPPED | OUTPATIENT
Start: 2024-04-27 | End: 2024-05-04

## 2024-04-26 RX ORDER — CEPHALEXIN 500 MG/1
500 CAPSULE ORAL EVERY 6 HOURS SCHEDULED
Qty: 19 CAPSULE | Refills: 0 | Status: SHIPPED | OUTPATIENT
Start: 2024-04-26 | End: 2024-05-01

## 2024-04-26 RX ORDER — METHYLPREDNISOLONE 4 MG/1
12 TABLET ORAL DAILY
Status: DISCONTINUED | OUTPATIENT
Start: 2024-04-29 | End: 2024-04-26 | Stop reason: HOSPADM

## 2024-04-26 RX ORDER — HEPARIN SODIUM 5000 [USP'U]/ML
5000 INJECTION, SOLUTION INTRAVENOUS; SUBCUTANEOUS EVERY 8 HOURS SCHEDULED
Status: DISCONTINUED | OUTPATIENT
Start: 2024-04-26 | End: 2024-04-26 | Stop reason: HOSPADM

## 2024-04-26 RX ORDER — ACETAMINOPHEN 325 MG/1
975 TABLET ORAL EVERY 8 HOURS SCHEDULED
Start: 2024-04-26

## 2024-04-26 RX ORDER — METHYLPREDNISOLONE 4 MG/1
TABLET ORAL DAILY
Qty: 15 TABLET | Refills: 0 | Status: SHIPPED | OUTPATIENT
Start: 2024-04-27 | End: 2024-05-01

## 2024-04-26 RX ADMIN — OXYCODONE HYDROCHLORIDE 5 MG: 5 TABLET ORAL at 15:06

## 2024-04-26 RX ADMIN — CEFAZOLIN SODIUM 1000 MG: 1 SOLUTION INTRAVENOUS at 12:22

## 2024-04-26 RX ADMIN — DOCUSATE SODIUM 100 MG: 100 CAPSULE, LIQUID FILLED ORAL at 17:22

## 2024-04-26 RX ADMIN — GABAPENTIN 300 MG: 300 CAPSULE ORAL at 15:06

## 2024-04-26 RX ADMIN — METHOCARBAMOL 750 MG: 750 TABLET ORAL at 17:22

## 2024-04-26 RX ADMIN — METHOCARBAMOL 750 MG: 750 TABLET ORAL at 11:44

## 2024-04-26 RX ADMIN — CEPHALEXIN 500 MG: 500 CAPSULE ORAL at 17:22

## 2024-04-26 RX ADMIN — PANTOPRAZOLE SODIUM 40 MG: 40 TABLET, DELAYED RELEASE ORAL at 05:56

## 2024-04-26 RX ADMIN — METHYLPREDNISOLONE 24 MG: 4 TABLET ORAL at 13:21

## 2024-04-26 RX ADMIN — SENNOSIDES 8.6 MG: 8.6 TABLET, FILM COATED ORAL at 08:53

## 2024-04-26 RX ADMIN — CEPHALEXIN 500 MG: 500 CAPSULE ORAL at 11:44

## 2024-04-26 RX ADMIN — POLYETHYLENE GLYCOL 3350 17 G: 17 POWDER, FOR SOLUTION ORAL at 08:53

## 2024-04-26 RX ADMIN — METHOCARBAMOL 750 MG: 750 TABLET ORAL at 05:56

## 2024-04-26 RX ADMIN — ACETAMINOPHEN 975 MG: 325 TABLET, FILM COATED ORAL at 13:21

## 2024-04-26 RX ADMIN — Medication 1 SPRAY: at 08:00

## 2024-04-26 RX ADMIN — DOCUSATE SODIUM 100 MG: 100 CAPSULE, LIQUID FILLED ORAL at 08:54

## 2024-04-26 RX ADMIN — ACETAMINOPHEN 975 MG: 325 TABLET, FILM COATED ORAL at 05:56

## 2024-04-26 RX ADMIN — CEFAZOLIN SODIUM 1000 MG: 1 SOLUTION INTRAVENOUS at 05:56

## 2024-04-26 RX ADMIN — GABAPENTIN 300 MG: 300 CAPSULE ORAL at 08:53

## 2024-04-26 RX ADMIN — OXYCODONE HYDROCHLORIDE 5 MG: 5 TABLET ORAL at 02:28

## 2024-04-26 NOTE — PROGRESS NOTES
[facility]  Progress Note  Name: Alejandra Cope I  MRN: [id number]  Unit/Bed#: [facility] 711-01 I Date of Admission: 2024   Date of Service: 2024 I Hospital Day: 2    Assessment/Plan   * Cervical radiculopathy  Assessment & Plan  POD#2 - s/p C4-6 ACDF (Dr. Lucas )   Hx of chronic neck pain with LUE radiculopathy   Pt has been following for Dr. Lucas for this in the office   Presented on  for elective anterior cervical decompression and fusion     Imagin/24 upright cervical x-rays: pending final radiology read   Per my review, expected post-op changed with appropriately placed hardware     Plan:   Continue to closely monitor neuro exam   Frequent neuro checks per primary team   Maintain normotensive BP goals  No drains in place   Continue local wound care to incision   Continue IV ancef x 48hrs post-op followed by keflex PO x 5 days   IV meds to be completed today around noon   Labs/Vitals:   VSS overnight. Afebrile. On room air   Post-op labs revealed a mild leukocytosis that is likely reactionary post-op and not unexpected. Otherwise, labs within normal limits.   No indication to repeat today   Pain control:   Continue scheduled   Tylenol 975mg PO q 8hrs   Gabapentin 300mg PO TID   Robaxin 750mg PO q 6hrs   Continue PRN  Oxycodone 2.5mg/5mg PO q 4hrs PRN mod-severe pain   Dilaudid 0.2mg IV q 8hrs PRN breakthrough pain --> will discontinue today  Will add a medrol dose pack for some report of pain with swallowing today. Pt is able to swallow solid food, easier with soft foods and liquids.   Bowel regimen: senna 1 tablet qd, coalce 100mg BID, miralax qd   Advance as needed   DVT ppx: SCDs, will start chem dvt ppx today if pt does not d/c home   Medical management per primary team   Pain control per primary team   PT/OT   Social work following for assistance with dispo once medically cleared     Neurosurgery will continue to follow as primary. Tentative plan  "for d/c home this afternoon pending progress. Please reach out with any further questions or concerns.            Subjective/Objective   Chief Complaint: \" I am feeling so tired today\"    Subjective: Patient seen and examined this a.m. on rounds.  No acute events overnight.  Patient states her pain remains well-controlled with her current pain medication regimen.  However she feels very exhausted today and a little bit anxious being in the hospital.  Patient otherwise offers no complaints.  She is looking forward to going home if she starts to feel little better this afternoon.    She continues to report improved radicular pain in the left upper extremity.  Denies any new weakness, numbness, bowel/bladder incontinence, urinary retention.    Objective: Pleasant, obese, middle-aged female sitting up comfortably in the chair.  No acute distress.    I/O         04/24 0701 04/25 0700 04/25 0701 04/26 0700 04/26 0701 04/27 0700    P.O.   180    I.V. (mL/kg) 1300 (11.5)      Total Intake(mL/kg) 1300 (11.5)  180 (1.6)    Urine (mL/kg/hr) 520 775 (0.3)     Total Output 520 775     Net +780 -775 +180                 Invasive Devices       Peripheral Intravenous Line  Duration             Peripheral IV 04/24/24 Right Antecubital 1 day    Peripheral IV 04/24/24 Right Hand 1 day                  Physical Exam:  Vitals: Blood pressure 150/100, pulse 91, temperature 98 °F (36.7 °C), temperature source Oral, resp. rate 17, height 5' 4\" (1.626 m), weight 113 kg (250 lb), last menstrual period 12/01/2016, SpO2 95%, not currently breastfeeding.,Body mass index is 42.91 kg/m².    General appearance: alert, appears stated age, cooperative and no distress  Head: Normocephalic, without obvious abnormality, atraumatic  Eyes: EOMI, PERRL  Neck:   -Anterior, horizontal cervical incision with clean, dry, intact Steri-Strips.  No surrounding erythema patient.  No drainage or bleeding noted.  Back: no kyphosis present, no tenderness to " "percussion or palpation  Lungs: non labored breathing, no respiratory distress on room air  Heart: regular heart rate  Neurologic:   Mental status: Alert, oriented x3, thought content appropriate  Cranial nerves: grossly intact (Cranial nerves II-XII)  Sensory: normal to light touch bilaterally throughout  Motor: moving all extremities without focal weakness   Reflexes: 2+ and symmetric  Coordination: finger to nose normal bilaterally, no drift bilaterally    Lab Results:  Results from last 7 days   Lab Units 04/25/24  0442   WBC Thousand/uL 14.40*   HEMOGLOBIN g/dL 13.8   HEMATOCRIT % 40.8   PLATELETS Thousands/uL 304     Results from last 7 days   Lab Units 04/25/24  0442   SODIUM mmol/L 138   POTASSIUM mmol/L 4.0   CHLORIDE mmol/L 105   CO2 mmol/L 23   BUN mg/dL 13   CREATININE mg/dL 0.83   CALCIUM mg/dL 8.6             Results from last 7 days   Lab Units 04/25/24  0442   INR  0.98   PTT seconds 25     No results found for: \"TROPONINT\"  ABG:No results found for: \"PHART\", \"WFH3VZP\", \"PO2ART\", \"EQU3CGT\", \"K5TEGIAP\", \"BEART\", \"SOURCE\"    Imaging Studies: I have personally reviewed pertinent reports.   and I have personally reviewed pertinent films in PACS    XR spine cervical 2 or 3 vw injury    Result Date: 4/25/2024  Impression: Fluoroscopy provided for procedure guidance. Please refer to the separate procedure note for additional details. Workstation performed: [id number]     EKG, Pathology, and Other Studies: I have personally reviewed pertinent reports.      VTE Pharmacologic Prophylaxis: Sequential compression device (Venodyne)  and Heparin    VTE Mechanical Prophylaxis: sequential compression device     "

## 2024-04-26 NOTE — ASSESSMENT & PLAN NOTE
POD#2 - s/p C4-6 ACDF (Dr. Lucas )   Hx of chronic neck pain with LUE radiculopathy   Pt has been following for Dr. Lucas for this in the office   Presented on  for elective anterior cervical decompression and fusion     Imagin/24 upright cervical x-rays: pending final radiology read   Per my review, expected post-op changed with appropriately placed hardware     Plan:   Continue to closely monitor neuro exam   Frequent neuro checks per primary team   Maintain normotensive BP goals  No drains in place   Continue local wound care to incision   Continue IV ancef x 48hrs post-op followed by keflex PO x 5 days   IV meds to be completed today around noon   Labs/Vitals:   VSS overnight. Afebrile. On room air   Post-op labs revealed a mild leukocytosis that is likely reactionary post-op and not unexpected. Otherwise, labs within normal limits.   No indication to repeat today   Pain control:   Continue scheduled   Tylenol 975mg PO q 8hrs   Gabapentin 300mg PO TID   Robaxin 750mg PO q 6hrs   Continue PRN  Oxycodone 2.5mg/5mg PO q 4hrs PRN mod-severe pain   Dilaudid 0.2mg IV q 8hrs PRN breakthrough pain --> will discontinue today  Will add a medrol dose pack for some report of pain with swallowing today. Pt is able to swallow solid food, easier with soft foods and liquids.   Bowel regimen: senna 1 tablet qd, coalce 100mg BID, miralax qd   Advance as needed   DVT ppx: SCDs, will start chem dvt ppx today if pt does not d/c home   Medical management per primary team   Pain control per primary team   PT/OT   Social work following for assistance with dispo once medically cleared     Neurosurgery will continue to follow as primary. Tentative plan for d/c home this afternoon pending progress. Please reach out with any further questions or concerns.

## 2024-04-26 NOTE — ASSESSMENT & PLAN NOTE
POD#2 - s/p C4-6 ACDF (Dr. Lucas )   Hx of chronic neck pain with LUE radiculopathy   Pt has been following for Dr. Lucas for this in the office   Presented on  for elective anterior cervical decompression and fusion     Imagin/24 upright cervical x-rays: pending final radiology read   Per my review, expected post-op changed with appropriately placed hardware     Plan:   Continue to closely monitor neuro exam   Frequent neuro checks per primary team   Maintain normotensive BP goals  No drains in place   Continue local wound care to incision   Continue IV ancef x 48hrs post-op followed by keflex PO x 5 days   IV meds to be completed today around noon   Labs/Vitals:   VSS overnight. Afebrile. On room air   Post-op labs revealed a mild leukocytosis that is likely reactionary post-op and not unexpected. Otherwise, labs within normal limits.   No indication to repeat today   Pain control:   Continue scheduled   Tylenol 975mg PO q 8hrs   Gabapentin 300mg PO TID   Robaxin 750mg PO q 6hrs   Continue PRN  Oxycodone 2.5mg/5mg PO q 4hrs PRN mod-severe pain   Dilaudid 0.2mg IV q 2hrs PRN breakthrough pain  Will decrease dosing to 8 8hrs PRN and plan to discontinue tomorrow morning   Bowel regimen: senna 1 tablet qd, coalce 100mg BID, miralax qd   Advance as needed   DVT ppx: SCDs, will start chem dvt ppx today if pt does not d/c home   Medical management per primary team   Pain control per primary team   PT/OT   Social work following for assistance with dispo once medically cleared     Neurosurgery will continue to follow as primary. Tentative plan for d/c home this afternoon pending progress. Please reach out with any further questions or concerns.

## 2024-04-26 NOTE — DISCHARGE INSTR - AVS FIRST PAGE
Discharge instructions  Anterior cervical decompression and fixation/fusion      Surgical incisional care:  Keep incision clean and dry. Avoid applying creams, lotion or antiseptic to incision area.  Allow steri-strips to fall off. If still in place at two week postoperative visit, we will remove them.  Check the wound daily. If the incision becomes red, swollen, tender, warm, or has increased drainage please notify physician immediately.  May shower 3 days after surgery, but do not soak in a tub and no swimming.  Use mild antimicrobial soap and water with a clean washcloth. Pat incision dry after showering and a clean towel daily.   Cervical VISTA collar to be worn at all times except for showering. Change from VISTA (grey) collar to the Betty (peach) collar prior to showering. Incision may be cleaned with water and a mild antimicrobial soap using a clean washcloth. Incision is to be gently patted dry with a clean towel. Once dry, collar should be changed back to a VISTA (grey) collar with clean pads in place.  Wash collar pads with mild soap and water. They are to be laid flat to dry on a clean towel. Recommend changing every 1-2 days.   Please refer to VISTA collar instructions for further details.    Activity Restrictions:  No heavy lifting greater than 5 - 10lbs. No strenuous activities.  May walk as tolerated. Encourage at least 4 short walks per day.   No driving while requiring cervical collar, anticipated six weeks.  No significant neck movement.  Diet: consider soft minced food with gravy. Recommend small bites with sips of water between.     Postoperative medication:  Take pain medications to relieve incision pain, and muscle relaxants to prevent spasms as directed. Please see after visit summary (AVS) for details.   Take over the counter stool softeners such as colace or senna-s to avoid constipation while on narcotics. Intake water and fiber intake.   Do not take ibuprofen, Naproxen/Aleve or any NSAID  until cleared by surgeon. May take Tylenol instead.  If taking Coumadin, Aspirin, or Plavix, you may resume these medications when cleared by Neurosurgery.    Follow-up as scheduled for a 2 week incision check. Follow-up 6 weeks after surgery with a repeat cervical spine upright x-rays to be completed prior to visit.    **Please notify MD immediately if you experience a fever of 101F, have increased neck or arm pain, new numbness and/or weakness in your arms/hands, difficulty swallowing or breathing especially while lying down, numbness or weakness in your legs.**

## 2024-04-26 NOTE — DISCHARGE SUMMARY
Discharge Summary - Neurosurgery   Alejandra Cope 43 y.o. female MRN: [id number]  [facility] Encounter: [id number]    Admission Date:   Admission Orders (From admission, onward)       Ordered        04/24/24 1447  Inpatient Admission  Once                           Discharge Date: 4/26/2024    Admitting Diagnosis: Cervical radiculopathy [M54.12]    Discharge Diagnosis: s/p C4-6 ACDF     Medical Problems       Resolved Problems  Date Reviewed: 2/23/2024   None       Attending: Dr. Lucas     Consulting Physician(s):   - PT/OT   - CM    Procedures Performed: No orders of the defined types were placed in this encounter.    Pathology: N/A    Hospital Course:   Patient is a 43-year-old female who has been following in the [facility] neurosurgery office with Dr. Lucas for chronic neck pain with left upper extremity radiculopathy.  She presented on 4/24 for elective C4-6 ACDF with Dr. Lucas.  No complications in surgery.  Patient was admitted to the hospital postoperatively for pain control and close monitoring.  Patient experienced some posterior and anterior neck pain as well as muscle spasms that were controlled by pain medications.  She no longer required use of IV pain medications by postop day 2.  Patient with stable vitals as well as postop labs that only revealed a mild leukocytosis that was likely reactionary postoperatively.  Patient was ambulating, urinating and passing flatus without difficulty.  Patient states she was noting some pain with swallowing but was able to swallow both solids and liquids.  Patient states it was easier with solid foods.  She was started on a Medrol Dosepak for assistance with this pain.  Patient's incision remained clean and dry.  Patient completed 48 hours of IV Ancef while inpatient and will complete 7 days of p.o. Keflex for surgical site prophylaxis.  Patient tolerating his medications well.  Patient was cleared for home by PT/OT.  On postop day 2, the patient  admitted to some anxiety being in the hospital and was hopeful for discharge.  From a neurosurgical standpoint, at this time patient was cleared for discharge to home.  Orders were placed.  Discharge instructions were reviewed at length with the patient.  All questions were answered.  Medications were sent to her pharmacy.  Patient's  arrived after work to pick her up and drive her home.  Outpatient appointments were scheduled and dates were relayed to the patient.    Condition at Discharge: good     Discharge instructions/Information to patient and family:   See after visit summary for information provided to patient and family.      Provisions for Follow-Up Care:  See after visit summary for information related to follow-up care and any pertinent home health orders.      Disposition: Home      Planned Readmission: No    Discharge Statement   I spent 25 minutes discharging the patient. This time was spent on the day of discharge. I had direct contact with the patient on the day of discharge. Additional documentation is required if more than 30 minutes were spent on discharge.     Discharge Medications:  See after visit summary for reconciled discharge medications provided to patient and family.

## 2024-04-29 ENCOUNTER — TELEPHONE (OUTPATIENT)
Dept: NEUROSURGERY | Facility: CLINIC | Age: 44
End: 2024-04-29

## 2024-04-29 ENCOUNTER — TRANSITIONAL CARE MANAGEMENT (OUTPATIENT)
Dept: INTERNAL MEDICINE CLINIC | Facility: CLINIC | Age: 44
End: 2024-04-29

## 2024-04-29 NOTE — UTILIZATION REVIEW
NOTIFICATION OF ADMISSION DISCHARGE   This is a Notification of Discharge from [facility]. Please be advised that this patient has been discharge from our facility. Below you will find the admission and discharge date and time including the patient’s disposition.   UTILIZATION REVIEW CONTACT:  Americo Duran  Utilization   Network Utilization Review Department  Phone: [phone] x carefully listen to the prompts. All voicemails are confidential.  Email: [email or web address]     ADMISSION INFORMATION  PRESENTATION DATE: 4/24/2024 12:05 PM  OBERVATION ADMISSION DATE:   INPATIENT ADMISSION DATE: 4/24/24  2:47 PM   DISCHARGE DATE: 4/26/2024  6:20 PM   DISPOSITION:Home/Self Care    Network Utilization Review Department  ATTENTION: Please call with any questions or concerns to [phone] and carefully listen to the prompts so that you are directed to the right person. All voicemails are confidential.   For Discharge needs, contact Care Management DC Support Team at [phone] opt. 2  Send all requests for admission clinical reviews, approved or denied determinations and any other requests to dedicated fax number below belonging to the campus where the patient is receiving treatment. List of dedicated fax numbers for the Facilities:  FACILITY NAME UR FAX NUMBER   ADMISSION DENIALS (Administrative/Medical Necessity) [phone]   DISCHARGE SUPPORT TEAM ([facility]) [phone]   PARENT CHILD HEALTH (Maternity/NICU/Pediatrics) [phone]   [facility] [phone]   [facility] [phone]   [facility] [phone]   [facility] [phone]   [facility] [phone]   [facility] [phone]   [facility] [phone]   [facility] [phone]   [facility]  [facility] [phone]   [facility] [phone]   [facility] [phone]   [facility] [phone]

## 2024-04-30 ENCOUNTER — TELEPHONE (OUTPATIENT)
Age: 44
End: 2024-04-30

## 2024-04-30 NOTE — TELEPHONE ENCOUNTER
Called patient to see how she is doing after surgery. Patient reports she is doing well overall and denies any incisional issues or fevers. Patient is able to ambulate around the house and complete ADLs. Educated the patient about the importance of preventing blood clots and provided measures how to prevent them.     Patient has moved her bowels since the surgery. Encouraged patient to take an over the counter stool softener, if she is taking narcotic pain medication. Encouraged fiber intake and fluids.    Reviewed incision care with the patient. Advised that after three days she may take a shower and gently wash the surgical site with soap and water. Use clean wash cloth, towels, and clothing. Do not submerge in water until cleared by the surgeon. Do not apply any creams, ointments, or lotions to the site.  Patient is aware to call the office if any redness, swelling, drainage, dehiscence of incision, or fever >100 F occurs.    Patient is aware to call the office if any concerns or questions may arise. Reminded patient of her upcoming appointments with the date/time/location. Patient was appreciative for the call.

## 2024-05-03 DIAGNOSIS — F41.9 ANXIETY: Primary | ICD-10-CM

## 2024-05-03 RX ORDER — LORAZEPAM 0.5 MG/1
0.5 TABLET ORAL 2 TIMES DAILY
Qty: 60 TABLET | Refills: 0 | Status: SHIPPED | OUTPATIENT
Start: 2024-05-03

## 2024-05-06 ENCOUNTER — TELEPHONE (OUTPATIENT)
Dept: NEUROSURGERY | Facility: CLINIC | Age: 44
End: 2024-05-06

## 2024-05-06 NOTE — TELEPHONE ENCOUNTER
Received call on nurseline from patient reporting a steri strip fell off last night after her shower and this morning she woke up with some blood and is concerned about infection. Patient sent in photo below. After review, incision looks ok. She denies fever or purulent drainage at this time. Patient will continue to monitor and update our office with any changes. Scheduled for nurse visit on 5/10, will call sooner if she develops a fever, the incision opens, or becomes red/hot to the touch. All questions answered at this time, she was appreciative of the assistance.

## 2024-05-07 ENCOUNTER — VBI (OUTPATIENT)
Dept: ADMINISTRATIVE | Facility: OTHER | Age: 44
End: 2024-05-07

## 2024-05-07 NOTE — TELEPHONE ENCOUNTER
05/07/24 10:13 AM     VB [employer] SmartForm used to document caregap status.    Mayra Tripathi    Tramadol; 8/4/21    Future appt:     Your appointments     Date & Time Appointment Department [facility])    Oct 04, 2021 11:00 AM CDT Medicare Annual Well Visit with Aric Rapp MD [address]

## 2024-05-10 ENCOUNTER — CLINICAL SUPPORT (OUTPATIENT)
Dept: NEUROSURGERY | Facility: CLINIC | Age: 44
End: 2024-05-10

## 2024-05-10 VITALS — SYSTOLIC BLOOD PRESSURE: 130 MMHG | DIASTOLIC BLOOD PRESSURE: 90 MMHG | TEMPERATURE: 98.5 F

## 2024-05-10 DIAGNOSIS — M43.22 FUSION OF SPINE OF CERVICAL REGION: ICD-10-CM

## 2024-05-10 DIAGNOSIS — Z98.890 POST-OPERATIVE STATE: Primary | ICD-10-CM

## 2024-05-10 DIAGNOSIS — Z98.890 POST-OPERATIVE STATE: ICD-10-CM

## 2024-05-10 DIAGNOSIS — Z79.2 PROPHYLACTIC ANTIBIOTIC: Primary | ICD-10-CM

## 2024-05-10 PROCEDURE — 99024 POSTOP FOLLOW-UP VISIT: CPT | Performed by: SPECIALIST

## 2024-05-10 RX ORDER — CEPHALEXIN 500 MG/1
500 CAPSULE ORAL EVERY 8 HOURS SCHEDULED
Qty: 21 CAPSULE | Refills: 0 | Status: SHIPPED | OUTPATIENT
Start: 2024-05-10 | End: 2024-05-17

## 2024-05-10 NOTE — PATIENT INSTRUCTIONS
1. Wash incision gently in the shower. Avoid submerging in tub, hot tub, or pool.  2. Leave incision open to air when ever possible, may cover loosely with gauze and paper tape for comfort. Do not seal incision under bandages.    3. Do not apply any creams, ointments, or lotions directly to incision.   4. Maintain activity restrictions of lifting, pushing, pulling no more than 5-10 pounds.   5. Ambulate as tolerated.   6. Return for follow-up visit on 6/3/2024 9:30   7. Complete any necessary imaging 2-3 days prior to upcoming appointment. X-Ray can be completed as a walk in and does not require an appointment.   8. Contact the office with any questions or concerns.

## 2024-05-10 NOTE — LETTER
May 10, 2024     Alejandra Cope  [address]  [place] [address]    Patient: Alejandra Cope   YOB: 1980   Date of Visit: 5/10/2024       To whom it may concern,     Alejandra Cope is under my professional care. She was seen in my office 5/10/2024. She may not return to work until after further evaluation at her next office visit planned 6/3/2024.    Please feel free to call with any questions or concerns.       Thank you,           Dr. Dilia Lucas MD

## 2024-05-10 NOTE — PROGRESS NOTES
Post-Op Visit- Neurosurgery    Alejandra Cope 43 y.o. female MRN: [id number]    Chief Complaint:  Patient presents post: 1. Right C4-5, C5-6 anterior cervical discectomy  2. C4-5, C5-6 interbody grafting (allograft-cornerstone bone) 3. C4-6 plating. - Right    History of Present Illness:  Patient presents for 2 week POV for incision check.     She arrived unaccompanied and ambulated well without assistive device. She reports pain is 5/10 today but believes it is a bit higher due to the drive in today. Overall her presurgical symptoms have improved.     Assessment:   Vitals:    05/10/24 1030   BP: 130/90   Temp: 98.5 °F (36.9 °C)       Wound Exam: Incision is clean and dry, there is a small area of what looks like superficial spreading at the medial pole of the incision. See below:         Procedure:  Steri strip removal. Patient reports the area of spreading is at the location of steri strip that fell off soon after surgery.   Complications: None.       Discussion/Summary:  Due to spreading of incision a picture of the site was forwarded to the surgeon for review. It was determined she will be started on prophylactic antibiotics and re-apply steri strips to the area of concern. She will monitor for additional drainage and call the office.     Doing well postoperatively. Reviewed incision care with patient including daily observation for s/s infection including: increased erythema, edema, drainage, dehiscence of incision or fever >101.  Should these be observed, she understands that she is to call and/or return immediately for reassessment.  Advised patient to continue cleansing area with mild soap and water and pat dry. Not to apply any lotions, creams, or ointments, & not to submerge in any water for 4 more weeks.     She is to maintain activity restrictions until cleared by the surgeon. Activity levels were also reviewed with the patient in  detail, she is to lift no greater than 10 pounds and ambulation is encouraged as tolerated.     Verified date/time/location of upcoming POV and reminded her to complete x-rays prior to her next appointment. She is to call the office with any further questions or concerns, or if any incisional issues or fevers would arise.

## 2024-05-15 ENCOUNTER — TELEPHONE (OUTPATIENT)
Dept: NEUROSURGERY | Facility: CLINIC | Age: 44
End: 2024-05-15

## 2024-05-15 NOTE — TELEPHONE ENCOUNTER
Received a call from Alejandra regarding the status of her incision. She forwarded a new image of the area.     Returned her call to discuss. She denies drainage at this time. She admits th steri-strips fell off over the weekend after our visit. Reviewed the picture and it appears in good condition. Directed her to complete her abx and continue to monitor. She should contact the office if it begins to leak again otherwise we will see her at her 6 week fup.

## 2024-05-21 NOTE — TELEPHONE ENCOUNTER
Reached out to Alejandra to check on her status and ensure her incision is in good condition. She reports there is no leakage, erythema, or pain. Directed to continue to monitor and reach out if that changes. She will follow-up as planned 6/3.

## 2024-05-28 ENCOUNTER — HOSPITAL ENCOUNTER (OUTPATIENT)
Dept: RADIOLOGY | Facility: HOSPITAL | Age: 44
Discharge: HOME | End: 2024-05-28

## 2024-05-28 ENCOUNTER — PATIENT MESSAGE (OUTPATIENT)
Dept: PAIN MEDICINE | Facility: CLINIC | Age: 44
End: 2024-05-28

## 2024-05-28 DIAGNOSIS — I10 ESSENTIAL HYPERTENSION: ICD-10-CM

## 2024-05-28 DIAGNOSIS — M79.2 NEUROPATHIC PAIN: Primary | ICD-10-CM

## 2024-05-28 DIAGNOSIS — Z98.890 POST-OPERATIVE STATE: ICD-10-CM

## 2024-05-28 DIAGNOSIS — M43.22 FUSION OF SPINE OF CERVICAL REGION: ICD-10-CM

## 2024-05-28 PROCEDURE — 72040 X-RAY EXAM NECK SPINE 2-3 VW: CPT

## 2024-05-28 RX ORDER — GABAPENTIN 300 MG/1
300 CAPSULE ORAL 3 TIMES DAILY
Qty: 90 CAPSULE | Refills: 0 | Status: SHIPPED | OUTPATIENT
Start: 2024-05-28

## 2024-05-30 RX ORDER — LISINOPRIL 30 MG/1
30 TABLET ORAL DAILY
Qty: 90 TABLET | Refills: 1 | Status: SHIPPED | OUTPATIENT
Start: 2024-05-30

## 2024-06-03 ENCOUNTER — OFFICE VISIT (OUTPATIENT)
Dept: NEUROSURGERY | Facility: CLINIC | Age: 44
End: 2024-06-03

## 2024-06-03 VITALS
TEMPERATURE: 98.2 F | WEIGHT: 250 LBS | DIASTOLIC BLOOD PRESSURE: 84 MMHG | OXYGEN SATURATION: 97 % | BODY MASS INDEX: 42.68 KG/M2 | RESPIRATION RATE: 17 BRPM | SYSTOLIC BLOOD PRESSURE: 126 MMHG | HEIGHT: 64 IN | HEART RATE: 84 BPM

## 2024-06-03 DIAGNOSIS — M54.2 CERVICALGIA: Primary | ICD-10-CM

## 2024-06-03 PROCEDURE — 99024 POSTOP FOLLOW-UP VISIT: CPT | Performed by: SPECIALIST

## 2024-06-03 NOTE — PROGRESS NOTES
POST-OPERATIVE OFFICE VISIT NOTE  - NEUROSURGERY -   [facility]  Dr. Lucas     6/3/2024     Alejandra Cope  [id number]     PCP:                                       Reba Mckinley PA-C               SUBJECTIVE:  Alejandra Cope is in our office for her routine post-operative visit.  At this point we are approximately 7 weeks out from surgery and she is doing terrific. Able to sleep.        Current Outpatient Medications:     acetaminophen (TYLENOL) 325 mg tablet, Take 3 tablets (975 mg total) by mouth every 8 (eight) hours (Patient taking differently: Take 975 mg by mouth as needed), Disp: , Rfl:     albuterol (PROVENTIL HFA,VENTOLIN HFA) 90 mcg/act inhaler, INHALE 2 PUFFS EVERY 6 HOURS AS NEEDED FOR WHEEZING, Disp: 18 g, Rfl: 2    Cholecalciferol (Vitamin D-3) 125 MCG (5000 UT) TABS, Take by mouth, Disp: , Rfl:     gabapentin (NEURONTIN) 300 mg capsule, Take 1 capsule (300 mg total) by mouth 3 (three) times a day, Disp: 90 capsule, Rfl: 0    lisinopril (ZESTRIL) 30 mg tablet, TAKE 1 TABLET BY MOUTH EVERY DAY, Disp: 90 tablet, Rfl: 1    omeprazole (PriLOSEC) 20 mg delayed release capsule, TAKE 1 CAPSULE BY MOUTH EVERY DAY, Disp: 90 capsule, Rfl: 1    docusate sodium (COLACE) 100 mg capsule, Take 1 capsule (100 mg total) by mouth 2 (two) times a day for 7 days (Patient not taking: Reported on 6/3/2024), Disp: 14 capsule, Rfl: 0    methocarbamol (ROBAXIN) 750 mg tablet, Take 1 tablet (750 mg total) by mouth every 6 (six) hours as needed for muscle spasms (muscle spasms or pain) (Patient not taking: Reported on 6/3/2024), Disp: 36 tablet, Rfl: 0    oxyCODONE (ROXICODONE) 5 immediate release tablet, Take 1 tablet (5 mg total) by mouth every 6 (six) hours as needed for severe pain or moderate pain (take 2.5mg (1/2 tablet) PO q 6hrs PRN moderate pain, take 5mg (1 tablet) PO q 6hrs PRN severe pain) Max Daily Amount: 20 mg (Patient not taking: Reported on 6/3/2024), Disp: 20 tablet, Rfl: 0     polyethylene glycol (MIRALAX) 17 g packet, Take 17 g by mouth daily for 7 days (Patient not taking: Reported on 6/3/2024), Disp: 119 g, Rfl: 0  No current facility-administered medications for this visit.    Facility-Administered Medications Ordered in Other Visits:     hydrocortisone (ANUSOL-HC) 2.5 % rectal cream, , Topical, 4x Daily PRN, Jacob Casper MD    EXAM    HEENT: Head: Normocephalic, no lesions, without obvious abnormality.  Neurologic: alert, oriented, normal speech, no focal findings or movement disorder noted  Incision well healed.     DIAGNOSTIC STUDIES: Dr. Lucas and I reviewed the x-ray of the cervical spine, obtained at our facility on 5/28/24.    No acute osseous abnormality. Anterior fusion C4-C6 without evidence of hardware failure        PLAN  It was our pleasure seeing Alejandra Cope in the office today for her second post-operative exam.  She is doing terrific. Incision well healed. Able to sleep better. Slowly will increase activity. Will hold off work for another 6 weeks.     Dilia Lucas MD

## 2024-06-06 ENCOUNTER — EVALUATION (OUTPATIENT)
Dept: PHYSICAL THERAPY | Facility: CLINIC | Age: 44
End: 2024-06-06
Payer: COMMERCIAL

## 2024-06-06 DIAGNOSIS — M54.12 CERVICAL RADICULOPATHY: ICD-10-CM

## 2024-06-06 PROCEDURE — 97140 MANUAL THERAPY 1/> REGIONS: CPT | Performed by: PHYSICAL THERAPIST

## 2024-06-06 PROCEDURE — 97110 THERAPEUTIC EXERCISES: CPT | Performed by: PHYSICAL THERAPIST

## 2024-06-06 PROCEDURE — 97161 PT EVAL LOW COMPLEX 20 MIN: CPT | Performed by: PHYSICAL THERAPIST

## 2024-06-06 NOTE — PROGRESS NOTES
PT Evaluation     Today's date: 2024  Patient name: Alejandra Cope  : 1980  MRN: [id number]  Referring provider: Rosemarie Vela*  Dx:   Encounter Diagnosis     ICD-10-CM    1. Cervical radiculopathy  M54.12 Ambulatory referral to Physical Therapy                     Assessment  Impairments: abnormal or restricted ROM, abnormal movement, activity intolerance, impaired physical strength, lacks appropriate home exercise program, pain with function and poor posture   Symptom irritability: moderate    Assessment details: Alejandra Cope is a pleasant 43 y.o. female who presents with S/P anterior cervical fusion c4-6. Primary movement impairment diagnosis of impaired C/S ROM. Her impairments have lead to participation restrictions in driving, household chores, ADLs, and work. She would benefit from working with a skilled PT in order to increase participation in aforementioned participation restrictions.        No further referral appears necessary at this time based upon examination results.    Pt. will benefit from skilled PT services that includes manual therapy techniques to enhance tissue extensibility, neuromuscular re-education to facilitate motor control, therapeutic exercise to increase functional mobility, and modalities prn to reduce pain and inflammation.            Understanding of Dx/Px/POC: good     Prognosis: good  Prognosis details: Positive prognostic indicators include positive attitude toward recovery, good understanding of diagnosis and treatment plan options, and absence of observed red flags.  Negative prognostic indicators include chronicity of symptoms.      Goals  ST. Independent with HEP in 2 weeks  2. Pt will have verbal report of improvement in symptoms by >/=25% in 2 weeks     To be achieved by D/C   LT. Pt will be able to perform household chores with no difficulty   2. Pt will be able to perform ADLs with no difficulty   3. Pt will be able to return to work   4. Pt  will be able to lift overhead with no difficulty       Plan  Patient would benefit from: skilled physical therapy    Planned therapy interventions: activity modification, manual therapy, motor coordination training, neuromuscular re-education, patient education, self care, therapeutic activities, therapeutic exercise, graded activity, home exercise program, graded exercise, functional ROM exercises and strengthening    Frequency: 2x week  Duration in weeks: 8  Plan of Care beginning date: 2024  Plan of Care expiration date: 2024  Treatment plan discussed with: patient    Subjective Evaluation    History of Present Illness  Date of surgery: 2024  Mechanism of injury: surgery  Mechanism of injury: Pt underwent surgery on 2024. She did not have a brace, she was using a bone stimulator which was giving her headaches and now isn't using it which her surgeon is aware.     She is currently not working. She works in an assisted living facility. She is looking to return to work.   Patient Goals  Patient goals for therapy: return to work  Patient goal: be able to drive with no difficulty, be able to bend over without sx's, be able to reach overhead, be able to vacuum, be able to lift objects,  Pain  At best pain ratin  At worst pain ratin  Quality: sharp        Objective     Postural Observations  Seated posture: fair  Standing posture: fair      Active Range of Motion   Cervical/Thoracic Spine       Cervical  Subcranial protraction:   Restriction level: moderate  Subcranial retraction:   Restriction level: moderate  Flexion: 27 degrees   Extension: 27 degrees      Left lateral flexion: 20 degrees      Right lateral flexion: 22 degrees      Left rotation: 50 degrees  Right rotation: 50 degrees       Strength/Myotome Testing   Cervical Spine     Left   Normal strength    Right   Normal strength    General Comments:      Cervical/Thoracic Comments  Rhd    R: elbow bent in lbs:  35, 40, 30      L  elbow bent in lbs:   30, 27, 25             Precautions: C/S surgery,       Manuals 6/6            Scar de sensitization  KB            C/S ROM              STM paraspinals                           Neuro Re-Ed             Scap ret  5s x20            TB rows             TB ext              BL ER                                                    Ther Ex             Pt education on HEP, POC 8 min            UBE backwards for postural training              Cervical AROM all direction  10 ea            Thoracic ext over chair                                                                  Ther Activity                                       Gait Training                                       Modalities

## 2024-06-12 ENCOUNTER — OFFICE VISIT (OUTPATIENT)
Dept: PHYSICAL THERAPY | Facility: CLINIC | Age: 44
End: 2024-06-12
Payer: COMMERCIAL

## 2024-06-12 DIAGNOSIS — M54.12 CERVICAL RADICULOPATHY: Primary | ICD-10-CM

## 2024-06-12 PROCEDURE — 97112 NEUROMUSCULAR REEDUCATION: CPT

## 2024-06-12 PROCEDURE — 97110 THERAPEUTIC EXERCISES: CPT

## 2024-06-12 NOTE — PROGRESS NOTES
"Daily Note     Today's date: 2024  Patient name: Alejandra Cope  : 1980  MRN: [id number]  Referring provider: Rosemarie Vela*  Dx:   Encounter Diagnosis     ICD-10-CM    1. Cervical radiculopathy  M54.12           Start Time: 745  Stop Time: 0830  Total time in clinic (min): 45 minutes    Subjective: \"my neck feels a little sore today but I have been doing the exercises at home and it is going well. I am scared I will not be able to do my job again\"       Objective: See treatment diary below      Assessment: Tolerated treatment well. Able to complete POC without incident. Requiring verbal/tactile cues for proper scapular stabilizer muscular engagement with there ex/neuro kole. Some reported discomfort with active cervical flexion and lateral rotation. Noted hypomobility of thoracic spine; pt tolerating thoracic stretches well. Noting appropriate cervical and scapular stabilizer fatigue throughout session. Good demonstration of there ex/neuro. General cervical hypomobility noted with AROM. Will cont to progress as tolerated. Patient exhibited good technique with therapeutic exercises and would benefit from continued PT      Plan: Continue per plan of care.  Progress treatment as tolerated.       Precautions: C/S surgery,       Manuals            Scar de sensitization  KB            C/S ROM              STM paraspinals                             Neuro Re-Ed             Scap ret  5s x20 5x x20           TB rows  Rtb 2x10            TB ext   Rtb 2x10            BL ER  Ytb 2x10            Chin tucks   2x10 3-5\"                                      Ther Ex             Pt education on HEP, POC 8 min            UBE backwards for postural training   10' L1 alt every 5           Cervical AROM all direction  10 ea 10 ea           Thoracic ext over chair   10x10\"            Neckslevel              Wall 9090  2x10 ytb                                     Ther Activity                                  "      Gait Training                                       Modalities             Cryo

## 2024-06-19 ENCOUNTER — OFFICE VISIT (OUTPATIENT)
Dept: PHYSICAL THERAPY | Facility: CLINIC | Age: 44
End: 2024-06-19
Payer: COMMERCIAL

## 2024-06-19 DIAGNOSIS — M54.12 CERVICAL RADICULOPATHY: Primary | ICD-10-CM

## 2024-06-19 PROCEDURE — 97110 THERAPEUTIC EXERCISES: CPT

## 2024-06-19 PROCEDURE — 97140 MANUAL THERAPY 1/> REGIONS: CPT

## 2024-06-19 PROCEDURE — 97112 NEUROMUSCULAR REEDUCATION: CPT

## 2024-06-19 NOTE — PROGRESS NOTES
"Daily Note     Today's date: 2024  Patient name: Alejandra Cope  : 1980  MRN: [id number]  Referring provider: Rosemarie Vela*  Dx:   Encounter Diagnosis     ICD-10-CM    1. Cervical radiculopathy  M54.12           Start Time: 0758  Stop Time: 0900  Total time in clinic (min): 62 minutes    Subjective: Pt reports cervical symptoms have been improving. Reports less days where she has experienced pain. Reports she is trying to slowly increase her lifting, reports she feels like she is \"pulling\" something when she lifts still.       Objective: See treatment diary below      Assessment: Tolerated treatment well. Able to complete POC without incident. Mild reported discomfort with cervical flexion AROM. Noting scapular stabilizer fatigue with there ex and neuro kole but able to complete all for appropriate amount of reps. Requiring some verbal/tactile cues for proper muscle engagement. Noting hypomobility of thoracic spine especially with ext; tolerated thoracic mobility exercises well. Noting decreased DNF endurance and fatigue, though improving each session. Good response to MT. Will cont to progress as tolerated. Patient demonstrated fatigue post treatment and would benefit from continued PT      Plan: Continue per plan of care.  Progress treatment as tolerated.       Precautions: C/S surgery,       Manuals      Scar de sensitization  KB  10' SOR, STM paraspinals     C/S ROM         STM paraspinals                   Neuro Re-Ed        Scap ret  5s x20 5x x20      TB rows  Rtb 2x10  Gtb 2x10      TB ext   Rtb 2x10  Gtb 2x10      BL ER  Ytb 2x10  Rtb 2x10      Chin tucks   2x10 3-5\"  2x10 3-5\" supine                      Ther Ex        Pt education on HEP, POC 8 min       UBE backwards for postural training   10' L1 alt every 5 10' L1 alt every 5     Cervical AROM all direction  10 ea 10 ea 10 ea     Thoracic ext over chair   10x10\"  10x10\"      Neckslevel         Wall 9090  2x10 ytb 2x10 " rtb     Wall walk   Rtb 2x10      YTI   X10 ea     Open books    X15 ea     Ther Activity                        Gait Training                        Modalities        Cryo

## 2024-06-25 ENCOUNTER — APPOINTMENT (EMERGENCY)
Dept: RADIOLOGY | Facility: HOSPITAL | Age: 44
End: 2024-06-25
Payer: COMMERCIAL

## 2024-06-25 ENCOUNTER — HOSPITAL ENCOUNTER (EMERGENCY)
Facility: HOSPITAL | Age: 44
Discharge: HOME | End: 2024-06-25
Attending: EMERGENCY MEDICINE
Payer: COMMERCIAL

## 2024-06-25 VITALS
SYSTOLIC BLOOD PRESSURE: 145 MMHG | HEART RATE: 88 BPM | RESPIRATION RATE: 18 BRPM | DIASTOLIC BLOOD PRESSURE: 54 MMHG | OXYGEN SATURATION: 97 % | TEMPERATURE: 98.2 F

## 2024-06-25 DIAGNOSIS — M25.532 ACUTE PAIN OF LEFT WRIST: Primary | ICD-10-CM

## 2024-06-25 DIAGNOSIS — S93.401A RIGHT ANKLE SPRAIN: ICD-10-CM

## 2024-06-25 PROCEDURE — 73610 X-RAY EXAM OF ANKLE: CPT

## 2024-06-25 PROCEDURE — 99284 EMERGENCY DEPT VISIT MOD MDM: CPT | Performed by: EMERGENCY MEDICINE

## 2024-06-25 PROCEDURE — 73110 X-RAY EXAM OF WRIST: CPT

## 2024-06-25 RX ORDER — KETOROLAC TROMETHAMINE 30 MG/ML
15 INJECTION, SOLUTION INTRAMUSCULAR; INTRAVENOUS ONCE
Status: COMPLETED | OUTPATIENT
Start: 2024-06-25 | End: 2024-06-25

## 2024-06-25 RX ADMIN — KETOROLAC TROMETHAMINE 15 MG: 30 INJECTION, SOLUTION INTRAMUSCULAR at 23:02

## 2024-06-26 ENCOUNTER — APPOINTMENT (OUTPATIENT)
Dept: PHYSICAL THERAPY | Facility: CLINIC | Age: 44
End: 2024-06-26
Payer: COMMERCIAL

## 2024-06-26 NOTE — ED PROVIDER NOTES
History  Chief Complaint   Patient presents with    Ankle Injury     Patient was walking and fell hurting her left ankle. Patient states she's unable to put pressure on foot. Patient also has some left hand pain from catching herself from falling.      Alejandra Cope is a 43 y.o. year old female with PMH of HTN, asthma presenting to the [facility] ED for evaluation of left wrist and right ankle pain after a fall. Patient was walking [employer] earlier this afternoon when she misstepped causing her to fall. Patient twisted her right ankle and landed on outstretched hand. Patient did not strike her head/neck and had no LOC. Since patient reporting discomfort in left wrist and ankle. No weakness/numbness in extremities. Patient reporting mild soreness in her neck which she has experienced previously after undergoing cervical laminectomy and interbody grafting/plating in April 2024. Patient denies associated chest pain, dyspnea, abdominal or low back pain. Patient has taken tylenol at home for symptomatic treatment.        History provided by:  Medical records and patient   used: No    Ankle Injury  Associated symptoms: no abdominal pain, no chest pain, no nausea, no shortness of breath and no vomiting        Prior to Admission Medications   Prescriptions Last Dose Informant Patient Reported? Taking?   Cholecalciferol (Vitamin D-3) 125 MCG (5000 UT) TABS   Yes No   Sig: Take by mouth   acetaminophen (TYLENOL) 325 mg tablet   No No   Sig: Take 3 tablets (975 mg total) by mouth every 8 (eight) hours   Patient taking differently: Take 975 mg by mouth as needed   albuterol (PROVENTIL HFA,VENTOLIN HFA) 90 mcg/act inhaler  Self No No   Sig: INHALE 2 PUFFS EVERY 6 HOURS AS NEEDED FOR WHEEZING   docusate sodium (COLACE) 100 mg capsule   No No   Sig: Take 1 capsule (100 mg total) by mouth 2 (two) times a day for 7 days   Patient not taking: Reported on 6/3/2024   gabapentin (NEURONTIN) 300 mg capsule    "No No   Sig: Take 1 capsule (300 mg total) by mouth 3 (three) times a day   lisinopril (ZESTRIL) 30 mg tablet   No No   Sig: TAKE 1 TABLET BY MOUTH EVERY DAY   methocarbamol (ROBAXIN) 750 mg tablet   No No   Sig: Take 1 tablet (750 mg total) by mouth every 6 (six) hours as needed for muscle spasms (muscle spasms or pain)   Patient not taking: Reported on 6/3/2024   omeprazole (PriLOSEC) 20 mg delayed release capsule   No No   Sig: TAKE 1 CAPSULE BY MOUTH EVERY DAY   oxyCODONE (ROXICODONE) 5 immediate release tablet   No No   Sig: Take 1 tablet (5 mg total) by mouth every 6 (six) hours as needed for severe pain or moderate pain (take 2.5mg (1/2 tablet) PO q 6hrs PRN moderate pain, take 5mg (1 tablet) PO q 6hrs PRN severe pain) Max Daily Amount: 20 mg   Patient not taking: Reported on 6/3/2024   polyethylene glycol (MIRALAX) 17 g packet   No No   Sig: Take 17 g by mouth daily for 7 days   Patient not taking: Reported on 6/3/2024      Facility-Administered Medications: None       Past Medical History:   Diagnosis Date    Anxiety     Asthma     states hasnt needed inhaler \"lately for months\"    Class 3 severe obesity due to excess calories without serious comorbidity with body mass index (BMI) of 40.0 to 44.9 in adult (HCC) 2022    Complete spontaneous  without complication     FIVE TIMES    Concussion     Depression     Environmental allergies     Headache     Hypertension     Insomnia     Migraine     Normal delivery     DAUGHTER    Normal delivery     SON    Pelvic inflammatory disease     RESOLVED: 18    Post concussion syndrome 2019    Recurrent pregnancy loss (CODE)     Thyroid nodule 2019       Past Surgical History:   Procedure Laterality Date    CHOLECYSTECTOMY      CYSTOSCOPY N/A 2016    Procedure: CYSTOSCOPY;  Surgeon: Kamryn Bloom MD;  Location: [facility];  Service:     HYSTERECTOMY      LAPAROSCOPIC CHOLECYSTECTOMY      RESOLVED:     NASAL SEPTUM SURGERY      REESOLVED: " 1999; DEVIATION    VA ARTHRD ANT INTERBODY DECOMPRESS CERVICAL BELW C2 Right 4/24/2024    Procedure: 1. Right C4-5, C5-6 anterior cervical discectomy  2. C4-5, C5-6 interbody grafting (allograft-cornerstone bone) 3. C4-6 plating.;  Surgeon: Dilia Lucas MD;  Location: [facility];  Service: Neurosurgery    VA LAPAROSCOPY W TOTAL HYSTERECTOMY UTERUS 250 GM/< N/A 12/28/2016    Procedure: HYSTERECTOMY LAPAROSCOPIC TOTAL ;  Surgeon: Kamryn Bloom MD;  Location: [facility];  Service: Gynecology    SALPINGECTOMY Bilateral 12/28/2016    Procedure: SALPINGECTOMY;  Surgeon: Kamryn Bloom MD;  Location: [facility];  Service:     TONSILLECTOMY      RESOLVED: 1997    TUBAL LIGATION      RESOLVED: 1/22/14       Family History   Problem Relation Age of Onset    Venous thrombosis Mother         ACUTE; OF DEEP VESSELS OF THE DISTAL LOWER EXTREMITY    COPD Mother     Heart disease Mother     Hypertension Mother     Mental illness Mother     Anxiety disorder Mother     OCD Mother     Colon cancer Father 59    Alcohol abuse Father     No Known Problems Daughter     Kidney disease Maternal Grandmother     No Known Problems Maternal Grandfather     Breast cancer Paternal Grandmother 40    Diabetes Paternal Grandfather         MELLITUS    No Known Problems Son     No Known Problems Maternal Aunt     No Known Problems Maternal Aunt      I have reviewed and agree with the history as documented.    E-Cigarette/Vaping    E-Cigarette Use Never User      E-Cigarette/Vaping Substances    Nicotine No     THC No     CBD No     Flavoring No     Other No     Unknown No      Social History     Tobacco Use    Smoking status: Former     Types: Cigars     Passive exposure: Current (rare exposure when drinking)    Smokeless tobacco: Never    Tobacco comments:     Rare occasional use of cigar smoking socially, no longer uses    Vaping Use    Vaping status: Never Used   Substance Use Topics    Alcohol use: Not Currently     Comment: socially - not  even monthly    Drug use: No       Review of Systems   Respiratory:  Negative for shortness of breath.    Cardiovascular:  Negative for chest pain.   Gastrointestinal:  Negative for abdominal pain, nausea and vomiting.   Genitourinary:  Negative for flank pain.   Musculoskeletal:  Positive for arthralgias and neck pain.   Neurological:  Negative for syncope, weakness and numbness.   All other systems reviewed and are negative.      Physical Exam  Physical Exam  Vitals and nursing note reviewed.   Constitutional:       General: She is not in acute distress.     Appearance: Normal appearance. She is well-developed. She is not ill-appearing, toxic-appearing or diaphoretic.   HENT:      Head: Normocephalic and atraumatic.      Nose: No congestion or rhinorrhea.   Eyes:      General:         Right eye: No discharge.         Left eye: No discharge.   Cardiovascular:      Rate and Rhythm: Normal rate and regular rhythm.   Pulmonary:      Effort: Pulmonary effort is normal. No accessory muscle usage or respiratory distress.      Breath sounds: Normal breath sounds. No stridor. No decreased breath sounds, wheezing, rhonchi or rales.   Abdominal:      General: There is no distension.      Palpations: Abdomen is soft.      Tenderness: There is no abdominal tenderness. There is no guarding or rebound.   Musculoskeletal:      Right elbow: Normal range of motion.      Left elbow: Normal range of motion.      Left forearm: No swelling, edema, tenderness or bony tenderness.      Left wrist: Tenderness, bony tenderness and snuff box tenderness present. No swelling, effusion or crepitus. Decreased range of motion.      Left hand: No swelling, lacerations, tenderness or bony tenderness. Normal range of motion. Normal strength. Normal sensation.      Cervical back: Normal range of motion and neck supple. No rigidity, spasms, tenderness or bony tenderness. Normal range of motion.      Thoracic back: No tenderness or bony tenderness.  Normal range of motion.      Right knee: Normal range of motion. No tenderness.      Left knee: Normal range of motion. No tenderness.      Right lower leg: No swelling or tenderness. No edema.      Left lower leg: No tenderness. No edema.      Right ankle: Swelling present. No lacerations. Tenderness present over the lateral malleolus. Decreased range of motion.      Right foot: Normal range of motion. No swelling, tenderness or bony tenderness.      Left foot: Normal range of motion. No swelling, tenderness or bony tenderness.   Skin:     Capillary Refill: Capillary refill takes less than 2 seconds.      Findings: No rash.   Neurological:      Mental Status: She is alert and oriented to person, place, and time.      GCS: GCS eye subscore is 4. GCS verbal subscore is 5. GCS motor subscore is 6.      Sensory: Sensation is intact.      Motor: Motor function is intact.      Comments: 5/5 strength b/l UE/LE.  Sensation grossly intact throughout.     Psychiatric:         Mood and Affect: Mood normal.         Behavior: Behavior normal.         Vital Signs  ED Triage Vitals [06/25/24 2244]   Temperature Pulse Respirations Blood Pressure SpO2   98.2 °F (36.8 °C) 88 18 145/54 97 %      Temp Source Heart Rate Source Patient Position - Orthostatic VS BP Location FiO2 (%)   Temporal Monitor Sitting Left arm --      Pain Score       9           Vitals:    06/25/24 2244   BP: 145/54   Pulse: 88   Patient Position - Orthostatic VS: Sitting         Visual Acuity      ED Medications  Medications   ketorolac (TORADOL) injection 15 mg (15 mg Intramuscular Given 6/25/24 2302)       Diagnostic Studies  Results Reviewed       None                   XR ankle 3+ views RIGHT   ED Interpretation by Deshawn Love DO (06/25 2313)   No acute fracture or dislocation.      XR wrist 3+ views LEFT   ED Interpretation by Deshawn Love DO (06/25 2313)   No acute fracture or dislocation.                 Procedures  Procedures         ED  Course                               SBIRT 20yo+      Flowsheet Row Most Recent Value   Initial Alcohol Screen: US AUDIT-C     1. How often do you have a drink containing alcohol? 0 Filed at: 06/25/2024 2242   2. How many drinks containing alcohol do you have on a typical day you are drinking?  0 Filed at: 06/25/2024 2242   3a. Male UNDER 65: How often do you have five or more drinks on one occasion? 0 Filed at: 06/25/2024 2242   3b. FEMALE Any Age, or MALE 65+: How often do you have 4 or more drinks on one occassion? 0 Filed at: 06/25/2024 2242   Audit-C Score 0 Filed at: 06/25/2024 2242   CRICKET: How many times in the past year have you...    Used an illegal drug or used a prescription medication for non-medical reasons? Never Filed at: 06/25/2024 2242                      Medical Decision Making    43 y.o. female presenting for evaluation after a fall.  VSS, nontoxic appearing.  Will order x-ray to evaluate for acute fracture or dislocation.  Will treat symptomatically.    Patient did not strike her head or neck during the fall. Patient reporting mild neck soreness similar to chronic pain and without neurologic symptoms. Do not suspect acute cervical spine injury or complication of prior cervical spine procedure. Will defer cervical spine imaging at this time.    I have reviewed preliminary ED interpretation of x-rays with the patient. The patient understands that their x-rays will be interpreted independently by a radiologist at a later time. The patient is agreeable to discharge at this time and understands that they may be contacted after discharge from the emergency department pending formal xray interpretation by radiology.   In regard to wrist injury discussed potential for underlying fracture or ligamentous injury not evident on imaging and emphasized importance of outpatient orthopedics follow-up.    Disposition: I have discussed with the patient our plan to discharge them from the ED and the patient is in  agreement with this plan.     Discharge Plan: Thumb spica brace provided for left wrist, crutches provided for right ankle sprain. Tylenol and ice for symptoms. RTED precautions emphasized. The patient was provided a written after visit summary with strict RTED precautions.     Followup: I have discussed with the patient plan to follow up with Orthopedics. Contact information provided in AVS.    Amount and/or Complexity of Data Reviewed  Radiology: ordered and independent interpretation performed.    Risk  Prescription drug management.             Disposition  Final diagnoses:   Acute pain of left wrist   Right ankle sprain     Time reflects when diagnosis was documented in both MDM as applicable and the Disposition within this note       Time User Action Codes Description Comment    6/25/2024 11:11 PM Deshawn Love [M25.532] Acute pain of left wrist     6/25/2024 11:11 PM Deshawn Love [S93.401A] Right ankle sprain           ED Disposition       ED Disposition   Discharge    Condition   Stable    Date/Time   Tue Jun 25, 2024 2311    Comment   Alejandra Cope discharge to home/self care.                   Follow-up Information       Follow up With Specialties Details Why Contact Info Additional Information    [facility] [place] [facility] Schedule an appointment as soon as possible for a visit in 1 week  [address] [place] [id number] [phone] [facility] [place], [address] [place] [address], [id number] [phone]            Discharge Medication List as of 6/25/2024 11:12 PM        CONTINUE these medications which have NOT CHANGED    Details   acetaminophen (TYLENOL) 325 mg tablet Take 3 tablets (975 mg total) by mouth every 8 (eight) hours, Starting Fri 4/26/2024, No Print      albuterol (PROVENTIL HFA,VENTOLIN HFA) 90 mcg/act inhaler INHALE 2 PUFFS EVERY 6 HOURS AS NEEDED FOR WHEEZING, Normal      Cholecalciferol  (Vitamin D-3) 125 MCG (5000 UT) TABS Take by mouth, Historical Med      docusate sodium (COLACE) 100 mg capsule Take 1 capsule (100 mg total) by mouth 2 (two) times a day for 7 days, Starting Fri 4/26/2024, Until Fri 5/3/2024, Normal      gabapentin (NEURONTIN) 300 mg capsule Take 1 capsule (300 mg total) by mouth 3 (three) times a day, Starting Tue 5/28/2024, Normal      lisinopril (ZESTRIL) 30 mg tablet TAKE 1 TABLET BY MOUTH EVERY DAY, Starting Thu 5/30/2024, Normal      methocarbamol (ROBAXIN) 750 mg tablet Take 1 tablet (750 mg total) by mouth every 6 (six) hours as needed for muscle spasms (muscle spasms or pain), Starting Fri 4/26/2024, Normal      omeprazole (PriLOSEC) 20 mg delayed release capsule TAKE 1 CAPSULE BY MOUTH EVERY DAY, Starting Mon 2/26/2024, Normal      oxyCODONE (ROXICODONE) 5 immediate release tablet Take 1 tablet (5 mg total) by mouth every 6 (six) hours as needed for severe pain or moderate pain (take 2.5mg (1/2 tablet) PO q 6hrs PRN moderate pain, take 5mg (1 tablet) PO q 6hrs PRN severe pain) Max Daily Amount: 20 mg, Starting Fri 4/26/2024, Mary l      polyethylene glycol (MIRALAX) 17 g packet Take 17 g by mouth daily for 7 days, Starting Sat 4/27/2024, Until Sat 5/4/2024, Normal                 PDMP Review       None            ED Provider  Electronically Signed by             Deshawn Love,   06/26/24 [id number]

## 2024-06-26 NOTE — DISCHARGE INSTRUCTIONS
You have been seen for evaluation of wrist and ankle pain after a fall. Please take tylenol as needed and apply ice to the area. Use the provided wrist brace and crutches. Return to the emergency department if you develop worsening pain, weakness/numbness or any other symptoms of concern. Please follow up with orthopedics by calling the number provided.

## 2024-06-27 ENCOUNTER — OFFICE VISIT (OUTPATIENT)
Dept: OBGYN CLINIC | Facility: CLINIC | Age: 44
End: 2024-06-27
Payer: COMMERCIAL

## 2024-06-27 VITALS — HEIGHT: 64 IN | WEIGHT: 267 LBS | BODY MASS INDEX: 45.58 KG/M2

## 2024-06-27 DIAGNOSIS — M25.532 ACUTE PAIN OF LEFT WRIST: Primary | ICD-10-CM

## 2024-06-27 DIAGNOSIS — S93.401A RIGHT ANKLE SPRAIN: ICD-10-CM

## 2024-06-27 PROCEDURE — 99214 OFFICE O/P EST MOD 30 MIN: CPT | Performed by: STUDENT IN AN ORGANIZED HEALTH CARE EDUCATION/TRAINING PROGRAM

## 2024-06-27 NOTE — PROGRESS NOTES
Ortho Sports Medicine New Patient Elbow Visit     Assesment:   43 y.o.female with left wrist pain after a fall 2 days ago with x-rays negative for fracture    Plan:  I reviewed the history, exam, and imaging with the patient in clinic today.  I did review the patient's x-rays which show no evidence of fracture.  On exam, she has mild tenderness over the radial side of the wrist including the radial styloid and anatomic snuffbox.  She has mild swelling.  She has no numbness or tingling.  Discussed with patient I would recommend immobilization in a thumb spica wrist brace.  Recommended follow-up in 2 weeks for repeat evaluation with repeat x-rays of the left wrist including scaphoid view. The patient demonstrated understanding of the discussion and was in agreement with the plan.  All of the questions were answered.  Patient can reach out to clinic with any questions or concerns at any time.        Conservative treatment:  Ice to elbow 1-2 times daily, for 20 minutes at a time.  Elevation  Tylenol and anti-inflammatories as needed for pain  Continue with thumb spica brace    Imaging:  All imaging from today was reviewed by myself and explained to the patient.     Injection:  No Injection planned at this time.    Surgery:  No surgery is recommended at this point, continue with conservative treatment plan as noted.    Follow up:  Return in about 2 weeks (around 7/11/2024).  Will need repeat x-rays of left wrist with scaphoid view        Chief Complaint   Patient presents with    Left Wrist - Pain       History of Present Illness:  The patient is a 43 y.o. RHD female seen in clinic for left wrist pain.    Main issue: pain  Location: left wrist  Onset: 2 days ago  Mechanism: patient fell and used her left arm to brace her fall, felt immediate pain in the left wrist, went to [facility], XR negative, placed in a wrist brace  Associated symptoms (swelling, mechanical): swelling of the wrist, no N/T  Aggravating: wrist  "ROM  Treatments: wrist brace, toradol shot in ED, not taking pain medications  Progression: improved pain in brace  Prior injuries or surgeries: none    Occupation: currently out of work after cervical spine surgery      Past Medical, Social and Family History:  Past Medical History:   Diagnosis Date    Anxiety     Asthma     states hasnt needed inhaler \"lately for months\"    Class 3 severe obesity due to excess calories without serious comorbidity with body mass index (BMI) of 40.0 to 44.9 in adult ([facility]) 2022    Complete spontaneous  without complication     FIVE TIMES    Concussion     Depression     Environmental allergies     Headache     Hypertension     Insomnia     Migraine     Normal delivery     DAUGHTER    Normal delivery     SON    Pelvic inflammatory disease     RESOLVED: 16    Post concussion syndrome 2019    Recurrent pregnancy loss (CODE)     Thyroid nodule 2019     Past Surgical History:   Procedure Laterality Date    CHOLECYSTECTOMY      CYSTOSCOPY N/A 2016    Procedure: CYSTOSCOPY;  Surgeon: Kamryn Bloom MD;  [facility];  Service:     HYSTERECTOMY      LAPAROSCOPIC CHOLECYSTECTOMY      RESOLVED:     NASAL SEPTUM SURGERY      REESOLVED: ; DEVIATION    CT ARTHRD ANT INTERBODY DECOMPRESS CERVICAL BELW C2 Right 2024    Procedure: 1. Right C4-5, C5-6 anterior cervical discectomy  2. C4-5, C5-6 interbody grafting (allograft-cornerstone bone) 3. C4-6 plating.;  Surgeon: Dilia Lucas MD;  [facility];  Service: Neurosurgery    CT LAPAROSCOPY W TOTAL HYSTERECTOMY UTERUS 250 GM/< N/A 2016    Procedure: HYSTERECTOMY LAPAROSCOPIC TOTAL ;  Surgeon: Kamryn Bloom MD;  [facility];  Service: Gynecology    SALPINGECTOMY Bilateral 2016    Procedure: SALPINGECTOMY;  Surgeon: Kamryn Bloom MD;  [facility];  Service:     TONSILLECTOMY      RESOLVED:     TUBAL LIGATION      RESOLVED: 14     No Known " Allergies  Current Outpatient Medications on File Prior to Visit   Medication Sig Dispense Refill    acetaminophen (TYLENOL) 325 mg tablet Take 3 tablets (975 mg total) by mouth every 8 (eight) hours (Patient taking differently: Take 975 mg by mouth as needed)      albuterol (PROVENTIL HFA,VENTOLIN HFA) 90 mcg/act inhaler INHALE 2 PUFFS EVERY 6 HOURS AS NEEDED FOR WHEEZING 18 g 2    gabapentin (NEURONTIN) 300 mg capsule Take 1 capsule (300 mg total) by mouth 3 (three) times a day 90 capsule 0    lisinopril (ZESTRIL) 30 mg tablet TAKE 1 TABLET BY MOUTH EVERY DAY 90 tablet 1    omeprazole (PriLOSEC) 20 mg delayed release capsule TAKE 1 CAPSULE BY MOUTH EVERY DAY 90 capsule 1    Cholecalciferol (Vitamin D-3) 125 MCG (5000 UT) TABS Take by mouth      docusate sodium (COLACE) 100 mg capsule Take 1 capsule (100 mg total) by mouth 2 (two) times a day for 7 days (Patient not taking: Reported on 6/3/2024) 14 capsule 0    methocarbamol (ROBAXIN) 750 mg tablet Take 1 tablet (750 mg total) by mouth every 6 (six) hours as needed for muscle spasms (muscle spasms or pain) (Patient not taking: Reported on 6/3/2024) 36 tablet 0    oxyCODONE (ROXICODONE) 5 immediate release tablet Take 1 tablet (5 mg total) by mouth every 6 (six) hours as needed for severe pain or moderate pain (take 2.5mg (1/2 tablet) PO q 6hrs PRN moderate pain, take 5mg (1 tablet) PO q 6hrs PRN severe pain) Max Daily Amount: 20 mg (Patient not taking: Reported on 6/3/2024) 20 tablet 0    polyethylene glycol (MIRALAX) 17 g packet Take 17 g by mouth daily for 7 days (Patient not taking: Reported on 6/3/2024) 119 g 0     Current Facility-Administered Medications on File Prior to Visit   Medication Dose Route Frequency Provider Last Rate Last Admin    hydrocortisone (ANUSOL-HC) 2.5 % rectal cream   Topical 4x Daily PRN Jacob Casper MD         Social History     Socioeconomic History    Marital status: /Civil Union     Spouse name: Not on file    Number of  children: Not on file    Years of education: Not on file    Highest education level: Not on file   Occupational History    Occupation: employed   Tobacco Use    Smoking status: Former     Types: Cigars     Passive exposure: Current (rare exposure when drinking)    Smokeless tobacco: Never    Tobacco comments:     Rare occasional use of cigar smoking socially, no longer uses    Vaping Use    Vaping status: Never Used   Substance and Sexual Activity    Alcohol use: Not Currently     Comment: socially - not even monthly    Drug use: No    Sexual activity: Yes     Partners: Male     Birth control/protection: None   Other Topics Concern    Not on file   Social History Narrative    CAFFEINE USE        Employed Full Time- [employer] -      Social Determinants of Health     Financial Resource Strain: Not on file   Food Insecurity: No Food Insecurity (4/25/2024)    Hunger Vital Sign     Worried About Running Out of Food in the Last Year: Never true     Ran Out of Food in the Last Year: Never true   Transportation Needs: No Transportation Needs (4/25/2024)    PRAPARE - Transportation     Lack of Transportation (Medical): No     Lack of Transportation (Non-Medical): No   Physical Activity: Not on file   Stress: Not on file   Social Connections: Not on file   Intimate Partner Violence: Not on file   Housing Stability: Low Risk  (4/25/2024)    Housing Stability Vital Sign     Unable to Pay for Housing in the Last Year: No     Number of Times Moved in the Last Year: 1     Homeless in the Last Year: No         I have reviewed the past medical, surgical, social and family history, medications and allergies as documented in the EMR.    Review of systems: ROS is negative other than that noted in the HPI.  Constitutional: Negative for fatigue and fever.   HENT: Negative for sore throat.    Respiratory: Negative for shortness of breath.    Cardiovascular: Negative for chest pain.   Gastrointestinal: Negative for  "abdominal pain.   Endocrine: Negative for cold intolerance and heat intolerance.   Genitourinary: Negative for flank pain.   Musculoskeletal: Negative for back pain.   Skin: Negative for rash.   Allergic/Immunologic: Negative for immunocompromised state.   Neurological: Negative for dizziness.   Psychiatric/Behavioral: Negative for agitation.     Physical Exam:    Height 5' 4\" (1.626 m), weight 121 kg (267 lb), last menstrual period 12/01/2016, not currently breastfeeding.    General/Constitutional: NAD, well developed, well nourished  HENT: Normocephalic, atraumatic  CV: Intact distal pulses, regular rate  Resp: No respiratory distress or labored breathing  Neuro: Alert and Oriented x 3  Psych: Normal mood, normal affect, normal judgement, normal behavior  Skin: Warm, dry, no rashes, no erythema      Focused left wrist Exam:  Skin is intact.  Mild swelling around the wrist.  No ecchymosis, erythema, or effusion.    Mild pain with range of motion of the wrist.    Mild tenderness over the radial styloid and anatomic snuffbox.  No tenderness over the ulnar styloid.  No tenderness over the metacarpals or phalanges.  No pain to palpation in the forearm, elbow, or shoulder.    UE NV Exam: +2 Radial pulses bilaterally  Sensation intact to light touch C5-T1 bilaterally  Sensation intact to light touch in the radial, median, ulnar, axillary nerve distributions   Radial/median/ulnar/AIN/PIN nerve motor intact    Bilateral shoulder, hand, elbow, and forearm ROM full, painless with passive ROM, no ttp or crepitance throughout extremities above wrist joint    Cervical ROM is full without pain, numbness or tingling    Negative spurling maneuver bilaterally       Elbow Imaging:    X-rays of the left wrist were obtained 6/25/2024 and reviewed with the patient.  Based on my independent review, imaging shows no evidence of fracture or dislocation.  "

## 2024-07-05 DIAGNOSIS — K21.00 GASTROESOPHAGEAL REFLUX DISEASE WITH ESOPHAGITIS: ICD-10-CM

## 2024-07-05 RX ORDER — OMEPRAZOLE 20 MG/1
20 CAPSULE, DELAYED RELEASE ORAL DAILY
Qty: 100 CAPSULE | Refills: 1 | Status: SHIPPED | OUTPATIENT
Start: 2024-07-05

## 2024-07-10 DIAGNOSIS — M79.2 NEUROPATHIC PAIN: ICD-10-CM

## 2024-07-10 RX ORDER — GABAPENTIN 300 MG/1
300 CAPSULE ORAL 3 TIMES DAILY
Qty: 90 CAPSULE | Refills: 0 | Status: SHIPPED | OUTPATIENT
Start: 2024-07-10

## 2024-07-11 ENCOUNTER — APPOINTMENT (OUTPATIENT)
Dept: RADIOLOGY | Facility: CLINIC | Age: 44
End: 2024-07-11
Payer: COMMERCIAL

## 2024-07-11 ENCOUNTER — OFFICE VISIT (OUTPATIENT)
Dept: OBGYN CLINIC | Facility: CLINIC | Age: 44
End: 2024-07-11
Payer: COMMERCIAL

## 2024-07-11 VITALS — BODY MASS INDEX: 44.39 KG/M2 | HEIGHT: 64 IN | WEIGHT: 260 LBS

## 2024-07-11 DIAGNOSIS — M25.532 ACUTE PAIN OF LEFT WRIST: ICD-10-CM

## 2024-07-11 DIAGNOSIS — M25.532 ACUTE PAIN OF LEFT WRIST: Primary | ICD-10-CM

## 2024-07-11 PROCEDURE — 99214 OFFICE O/P EST MOD 30 MIN: CPT | Performed by: STUDENT IN AN ORGANIZED HEALTH CARE EDUCATION/TRAINING PROGRAM

## 2024-07-11 PROCEDURE — 73110 X-RAY EXAM OF WRIST: CPT

## 2024-07-11 NOTE — PROGRESS NOTES
"ASSESSMENT/PLAN:    Diagnoses and all orders for this visit:    Acute pain of left wrist  -     XR wrist 3+ vw left; Future      X-rays of the patient's left wrist are negative for any fractures or dislocations.  The patient overall is doing very well since her injury.  Formal physical therapy was offered to the patient to which she declined.  She is now cleared from orthopedic perspective.  She may perform home exercise program for strengthening, stretching and range of motion.  She will follow-up with our office as needed.  The patient is acceptable to this plan.    Return if symptoms worsen or fail to improve.      _____________________________________________________  CHIEF COMPLAINT:  Chief Complaint   Patient presents with    Left Wrist - Pain, Follow-up         SUBJECTIVE:  Alejandra Cope is a 43 y.o. female who presents to our office for a follow-up visit.  The patient is status post fall along her left wrist on 2024.  She has been using a thumb spica splint.  She complains of some mild pain along her thumb when holding heavy objects.  She denies any numbness or tingling.  She denies any fever or chills.  She states her symptoms have improved since last visit.    The following portions of the patient's history were reviewed and updated as appropriate: allergies, current medications, past family history, past medical history, past social history, past surgical history and problem list.    PAST MEDICAL HISTORY:  Past Medical History:   Diagnosis Date    Anxiety     Asthma     states hasnt needed inhaler \"lately for months\"    Class 3 severe obesity due to excess calories without serious comorbidity with body mass index (BMI) of 40.0 to 44.9 in adult ([facility]) 2022    Complete spontaneous  without complication     FIVE TIMES    Concussion     Depression     Environmental allergies     Headache     Hypertension     Insomnia     Migraine     Normal delivery     DAUGHTER    Normal delivery     SON    " Pelvic inflammatory disease     RESOLVED: 1/8/16    Post concussion syndrome 11/11/2019    Recurrent pregnancy loss (CODE)     Thyroid nodule 03/02/2019       PAST SURGICAL HISTORY:  Past Surgical History:   Procedure Laterality Date    CHOLECYSTECTOMY      CYSTOSCOPY N/A 12/28/2016    Procedure: CYSTOSCOPY;  Surgeon: Kamryn Bloom MD;  [facility];  Service:     HYSTERECTOMY      LAPAROSCOPIC CHOLECYSTECTOMY      RESOLVED: 2006    NASAL SEPTUM SURGERY      REESOLVED: 1999; DEVIATION    MD ARTHRD ANT INTERBODY DECOMPRESS CERVICAL BELW C2 Right 4/24/2024    Procedure: 1. Right C4-5, C5-6 anterior cervical discectomy  2. C4-5, C5-6 interbody grafting (allograft-cornerstone bone) 3. C4-6 plating.;  Surgeon: Dilia Lucas MD;  [facility];  Service: Neurosurgery    MD LAPAROSCOPY W TOTAL HYSTERECTOMY UTERUS 250 GM/< N/A 12/28/2016    Procedure: HYSTERECTOMY LAPAROSCOPIC TOTAL ;  Surgeon: Kamryn Bloom MD;  [facility];  Service: Gynecology    SALPINGECTOMY Bilateral 12/28/2016    Procedure: SALPINGECTOMY;  Surgeon: Kamryn Bloom MD;  [facility];  Service:     TONSILLECTOMY      RESOLVED: 1997    TUBAL LIGATION      RESOLVED: 1/22/14       FAMILY HISTORY:  Family History   Problem Relation Age of Onset    Venous thrombosis Mother         ACUTE; OF DEEP VESSELS OF THE DISTAL LOWER EXTREMITY    COPD Mother     Heart disease Mother     Hypertension Mother     Mental illness Mother     Anxiety disorder Mother     OCD Mother     Colon cancer Father 59    Alcohol abuse Father     No Known Problems Daughter     Kidney disease Maternal Grandmother     No Known Problems Maternal Grandfather     Breast cancer Paternal Grandmother 40    Diabetes Paternal Grandfather         MELLITUS    No Known Problems Son     No Known Problems Maternal Aunt     No Known Problems Maternal Aunt        SOCIAL HISTORY:  Social History     Tobacco Use    Smoking status: Former     Types: Cigars     Passive  exposure: Current (rare exposure when drinking)    Smokeless tobacco: Never    Tobacco comments:     Rare occasional use of cigar smoking socially, no longer uses    Vaping Use    Vaping status: Never Used   Substance Use Topics    Alcohol use: Not Currently     Comment: socially - not even monthly    Drug use: No       MEDICATIONS:    Current Outpatient Medications:     acetaminophen (TYLENOL) 325 mg tablet, Take 3 tablets (975 mg total) by mouth every 8 (eight) hours (Patient taking differently: Take 975 mg by mouth as needed), Disp: , Rfl:     albuterol (PROVENTIL HFA,VENTOLIN HFA) 90 mcg/act inhaler, INHALE 2 PUFFS EVERY 6 HOURS AS NEEDED FOR WHEEZING, Disp: 18 g, Rfl: 2    gabapentin (NEURONTIN) 300 mg capsule, TAKE 1 CAPSULE BY MOUTH THREE TIMES A DAY, Disp: 90 capsule, Rfl: 0    lisinopril (ZESTRIL) 30 mg tablet, TAKE 1 TABLET BY MOUTH EVERY DAY, Disp: 90 tablet, Rfl: 1    omeprazole (PriLOSEC) 20 mg delayed release capsule, TAKE 1 CAPSULE BY MOUTH EVERY DAY, Disp: 100 capsule, Rfl: 1    Cholecalciferol (Vitamin D-3) 125 MCG (5000 UT) TABS, Take by mouth, Disp: , Rfl:     docusate sodium (COLACE) 100 mg capsule, Take 1 capsule (100 mg total) by mouth 2 (two) times a day for 7 days (Patient not taking: Reported on 6/3/2024), Disp: 14 capsule, Rfl: 0    methocarbamol (ROBAXIN) 750 mg tablet, Take 1 tablet (750 mg total) by mouth every 6 (six) hours as needed for muscle spasms (muscle spasms or pain) (Patient not taking: Reported on 6/3/2024), Disp: 36 tablet, Rfl: 0    oxyCODONE (ROXICODONE) 5 immediate release tablet, Take 1 tablet (5 mg total) by mouth every 6 (six) hours as needed for severe pain or moderate pain (take 2.5mg (1/2 tablet) PO q 6hrs PRN moderate pain, take 5mg (1 tablet) PO q 6hrs PRN severe pain) Max Daily Amount: 20 mg (Patient not taking: Reported on 6/3/2024), Disp: 20 tablet, Rfl: 0    polyethylene glycol (MIRALAX) 17 g packet, Take 17 g by mouth daily for 7 days (Patient not taking:  "Reported on 6/3/2024), Disp: 119 g, Rfl: 0  No current facility-administered medications for this visit.    Facility-Administered Medications Ordered in Other Visits:     hydrocortisone (ANUSOL-HC) 2.5 % rectal cream, , Topical, 4x Daily PRN, Jacob Casper MD    ALLERGIES:  No Known Allergies    ROS:  Review of Systems     Constitutional: Negative for fatigue, fever or loss of appetite.   HENT: Negative.    Respiratory: Negative for shortness of breath, dyspnea.    Cardiovascular: Negative for chest pain/tightness.   Gastrointestinal: Negative for abdominal pain, N/V.   Endocrine: Negative for cold/heat intolerance, unexplained weight loss/gain.   Genitourinary: Negative for flank pain, dysuria, hematuria.   Musculoskeletal: Negative for arthralgia   Skin: Negative for rash.    Neurological: Negative for numbness or tingling  Psychiatric/Behavioral: Negative for agitation.  _____________________________________________________  PHYSICAL EXAMINATION:    Height 5' 4\" (1.626 m), weight 118 kg (260 lb), last menstrual period 12/01/2016, not currently breastfeeding.    Constitutional: Oriented to person, place, and time. Appears well-developed and well-nourished. No distress.   HENT:   Head: Normocephalic.   Eyes: Conjunctivae are normal. Right eye exhibits no discharge. Left eye exhibits no discharge. No scleral icterus.   Cardiovascular: Normal rate.    Pulmonary/Chest: Effort normal.   Neurological: Alert and oriented to person, place, and time.   Skin: Skin is warm and dry. No rash noted. Not diaphoretic. No erythema. No pallor.   Psychiatric: Normal mood and affect. Behavior is normal. Judgment and thought content normal.      MUSCULOSKELETAL EXAMINATION:   Physical Exam  Ortho Exam    Left upper extremity is neurovascularly intact  Fingers are pink and mobile  Compartments are soft  Negative Finkelstein's  No tenderness to palpation  Good range of motion of wrist and thumb  Brisk cap refill  Sensation " "intact  Negative Tinel's  Negative Phalen's  No anatomical snuffbox tenderness  Objective:  BP Readings from Last 1 Encounters:   06/25/24 145/54      Wt Readings from Last 1 Encounters:   07/11/24 118 kg (260 lb)        BMI:   Estimated body mass index is 44.63 kg/m² as calculated from the following:    Height as of this encounter: 5' 4\" (1.626 m).    Weight as of this encounter: 118 kg (260 lb).      Scribe Attestation      I,:  Chato Betts PA-C am acting as a scribe while in the presence of the attending physician.:       I,:  Demond Borjas MD personally performed the services described in this documentation    as scribed in my presence.:            "

## 2024-08-07 ENCOUNTER — OFFICE VISIT (OUTPATIENT)
Dept: INTERNAL MEDICINE CLINIC | Facility: CLINIC | Age: 44
End: 2024-08-07
Payer: COMMERCIAL

## 2024-08-07 ENCOUNTER — TELEPHONE (OUTPATIENT)
Age: 44
End: 2024-08-07

## 2024-08-07 VITALS
HEART RATE: 73 BPM | TEMPERATURE: 97 F | WEIGHT: 264.4 LBS | DIASTOLIC BLOOD PRESSURE: 84 MMHG | HEIGHT: 64 IN | BODY MASS INDEX: 45.14 KG/M2 | OXYGEN SATURATION: 98 % | SYSTOLIC BLOOD PRESSURE: 120 MMHG

## 2024-08-07 DIAGNOSIS — F32.1 CURRENT MODERATE EPISODE OF MAJOR DEPRESSIVE DISORDER WITHOUT PRIOR EPISODE (HCC): ICD-10-CM

## 2024-08-07 DIAGNOSIS — Z23 ENCOUNTER FOR IMMUNIZATION: ICD-10-CM

## 2024-08-07 DIAGNOSIS — E66.01 CLASS 3 SEVERE OBESITY DUE TO EXCESS CALORIES WITHOUT SERIOUS COMORBIDITY WITH BODY MASS INDEX (BMI) OF 40.0 TO 44.9 IN ADULT (HCC): ICD-10-CM

## 2024-08-07 DIAGNOSIS — Z12.31 ENCOUNTER FOR SCREENING MAMMOGRAM FOR BREAST CANCER: ICD-10-CM

## 2024-08-07 DIAGNOSIS — F32.1 CURRENT MODERATE EPISODE OF MAJOR DEPRESSIVE DISORDER WITHOUT PRIOR EPISODE (HCC): Primary | ICD-10-CM

## 2024-08-07 PROCEDURE — 90471 IMMUNIZATION ADMIN: CPT

## 2024-08-07 PROCEDURE — 99213 OFFICE O/P EST LOW 20 MIN: CPT | Performed by: PHYSICIAN ASSISTANT

## 2024-08-07 PROCEDURE — 90677 PCV20 VACCINE IM: CPT

## 2024-08-07 RX ORDER — BUSPIRONE HYDROCHLORIDE 10 MG/1
10 TABLET ORAL 2 TIMES DAILY
Qty: 60 TABLET | Refills: 2 | Status: SHIPPED | OUTPATIENT
Start: 2024-08-07

## 2024-08-07 NOTE — TELEPHONE ENCOUNTER
Reason for call:   [x] Prior Auth  [] Other:     Caller:  [] Patient  [x] Pharmacy  Name: [facility]  Address: [address] [place] [address]  Callback Number: [phone]    vortioxetine (TRINTELLIX) 10 MG tablet     Ordering Provider:   [x] PCP/Provider -   [] Speciality/Provider -

## 2024-08-08 PROBLEM — F32.1 CURRENT MODERATE EPISODE OF MAJOR DEPRESSIVE DISORDER WITHOUT PRIOR EPISODE (HCC): Status: ACTIVE | Noted: 2024-08-08

## 2024-08-08 RX ORDER — VORTIOXETINE 10 MG/1
10 TABLET, FILM COATED ORAL DAILY
Qty: 30 TABLET | Refills: 5 | Status: SHIPPED | OUTPATIENT
Start: 2024-08-08

## 2024-08-08 NOTE — ASSESSMENT & PLAN NOTE
Start Trintellix to help with symptoms. Restart buspar to help with acute anxiety. See HPI for full list of previous trials and failures. Directions for use and possible side effects discussed and patient verbalized understanding of these.

## 2024-08-08 NOTE — PROGRESS NOTES
Ambulatory Visit  Name: Alejandra Cope      : 1980      MRN: [id number]  Encounter Provider: Reba Mckinley PA-C  Encounter Date: 2024   Encounter department: [facility]    Assessment & Plan   1. Current moderate episode of major depressive disorder without prior episode (HCC)  Assessment & Plan:  Start Trintellix to help with symptoms. Restart buspar to help with acute anxiety. See HPI for full list of previous trials and failures. Directions for use and possible side effects discussed and patient verbalized understanding of these.    Orders:  -     busPIRone (BUSPAR) 10 mg tablet; Take 1 tablet (10 mg total) by mouth 2 (two) times a day  2. Encounter for screening mammogram for breast cancer  -     Mammo screening bilateral w 3d & cad; Future; Expected date: 2024  3. Encounter for immunization  -     Pneumococcal Conjugate Vaccine 20-valent (Pcv20)  4. Class 3 severe obesity due to excess calories without serious comorbidity with body mass index (BMI) of 40.0 to 44.9 in adult (HCC)  -     Ambulatory Referral to Nutrition Services; Future      Depression Screening and Follow-up Plan: Patient was screened for depression during today's encounter. They screened negative with a PHQ-9 score of 4.      History of Present Illness     Pt presents for evaluation of worsening anxiety. She notes that she has spine surgery in April and ever since then her anxiety has been much worse. She has anxiety prior but felt it was manageable. She notes anxiety about being alone and anxiety about going to sleep. She has a lot of racing thoughts and tearful moments. She has previously tried ativan but did not like how she felt with it. She also previously tried buspar, hydroxyzine, wellbutrin, lexapro, prozac, zoloft and cymbalta. Out of all of these she felt zoloft worked best.         Review of Systems   Constitutional:  Negative for chills and fever.   HENT:  Negative for congestion, ear pain,  hearing loss, postnasal drip, rhinorrhea, sinus pressure, sinus pain, sore throat and trouble swallowing.    Eyes:  Negative for pain and visual disturbance.   Respiratory:  Negative for cough, chest tightness, shortness of breath and wheezing.    Cardiovascular: Negative.  Negative for chest pain, palpitations and leg swelling.   Gastrointestinal:  Negative for abdominal pain, blood in stool, constipation, diarrhea, nausea and vomiting.   Endocrine: Negative for cold intolerance, heat intolerance, polydipsia, polyphagia and polyuria.   Genitourinary:  Negative for difficulty urinating, dysuria, flank pain and urgency.   Musculoskeletal:  Negative for arthralgias, back pain, gait problem and myalgias.   Skin:  Negative for rash.   Allergic/Immunologic: Negative.    Neurological:  Negative for dizziness, weakness, light-headedness and headaches.   Hematological: Negative.    Psychiatric/Behavioral:  Positive for dysphoric mood. Negative for behavioral problems and sleep disturbance. The patient is nervous/anxious.      Current Outpatient Medications on File Prior to Visit   Medication Sig Dispense Refill   • albuterol (PROVENTIL HFA,VENTOLIN HFA) 90 mcg/act inhaler INHALE 2 PUFFS EVERY 6 HOURS AS NEEDED FOR WHEEZING 18 g 2   • gabapentin (NEURONTIN) 300 mg capsule TAKE 1 CAPSULE BY MOUTH THREE TIMES A DAY 90 capsule 0   • lisinopril (ZESTRIL) 30 mg tablet TAKE 1 TABLET BY MOUTH EVERY DAY 90 tablet 1   • omeprazole (PriLOSEC) 20 mg delayed release capsule TAKE 1 CAPSULE BY MOUTH EVERY  capsule 1   • acetaminophen (TYLENOL) 325 mg tablet Take 3 tablets (975 mg total) by mouth every 8 (eight) hours (Patient taking differently: Take 975 mg by mouth as needed)       Current Facility-Administered Medications on File Prior to Visit   Medication Dose Route Frequency Provider Last Rate Last Admin   • hydrocortisone (ANUSOL-HC) 2.5 % rectal cream   Topical 4x Daily PRN Jacob Casper MD          Social History     Tobacco  "Use   • Smoking status: Former     Types: Cigars     Passive exposure: Current (rare exposure when drinking)   • Smokeless tobacco: Never   • Tobacco comments:     Rare occasional use of cigar smoking socially, no longer uses    Vaping Use   • Vaping status: Never Used   Substance and Sexual Activity   • Alcohol use: Yes     Comment: socially - not even monthly   • Drug use: No   • Sexual activity: Yes     Partners: Male     Birth control/protection: None     Objective     /84   Pulse 73   Temp (!) 97 °F (36.1 °C)   Ht 5' 4\" (1.626 m)   Wt 120 kg (264 lb 6.4 oz)   LMP 12/01/2016   SpO2 98%   BMI 45.38 kg/m²     Physical Exam  Vitals and nursing note reviewed.   Constitutional:       General: She is not in acute distress.     Appearance: Normal appearance. She is well-developed. She is not diaphoretic.   HENT:      Head: Normocephalic and atraumatic.      Right Ear: External ear normal.      Left Ear: External ear normal.      Nose: Nose normal.      Mouth/Throat:      Pharynx: No oropharyngeal exudate.   Eyes:      General: No scleral icterus.        Right eye: No discharge.         Left eye: No discharge.      Conjunctiva/sclera: Conjunctivae normal.      Pupils: Pupils are equal, round, and reactive to light.   Neck:      Thyroid: No thyromegaly.   Cardiovascular:      Rate and Rhythm: Normal rate and regular rhythm.      Heart sounds: Normal heart sounds. No murmur heard.     No friction rub. No gallop.   Pulmonary:      Effort: Pulmonary effort is normal. No respiratory distress.      Breath sounds: Normal breath sounds. No wheezing or rales.   Abdominal:      General: Bowel sounds are normal. There is no distension.      Palpations: Abdomen is soft.      Tenderness: There is no abdominal tenderness.   Musculoskeletal:         General: No tenderness or deformity. Normal range of motion.      Cervical back: Normal range of motion and neck supple.   Skin:     General: Skin is warm and dry. "   Neurological:      Mental Status: She is alert and oriented to person, place, and time.      Cranial Nerves: No cranial nerve deficit.   Psychiatric:         Mood and Affect: Mood is anxious and depressed.         Behavior: Behavior normal.         Thought Content: Thought content normal.         Judgment: Judgment normal.       Administrative Statements   I have spent a total time of 20 minutes in caring for this patient on the day of the visit/encounter including Instructions for management, Patient and family education, Importance of tx compliance, Risk factor reductions, Impressions, Counseling / Coordination of care, Documenting in the medical record, Reviewing / ordering tests, medicine, procedures  , and Obtaining or reviewing history  .

## 2024-08-16 ENCOUNTER — TELEPHONE (OUTPATIENT)
Dept: MAMMOGRAPHY | Facility: CLINIC | Age: 44
End: 2024-08-16

## 2024-08-22 NOTE — TELEPHONE ENCOUNTER
PA for Tintellix 10mg APPROVED     Case #[id number]    Patient advised by          [] MyChart Message  [] Phone call   [x]LMOM  []L/M to call office as no active Communication consent on file  []Unable to leave detailed message as VM not approved on Communication consent       Pharmacy advised by    [x]Fax  []Phone call    Approval letter scanned into Media Yes

## 2024-08-22 NOTE — TELEPHONE ENCOUNTER
PA for Trintellix 10 MG tablet SUBMITTED     via    []CMM-KEY:   []Abramripts-Case ID #   []Faxed to plan   [x]Other website - [employer]  - ID [id number]  []Phone call Case ID #     Office notes sent, clinical questions answered. Awaiting determination    Turnaround time for your insurance to make a decision on your Prior Authorization can take 7-21 business days.

## 2024-09-02 DIAGNOSIS — F32.1 CURRENT MODERATE EPISODE OF MAJOR DEPRESSIVE DISORDER WITHOUT PRIOR EPISODE (HCC): ICD-10-CM

## 2024-09-03 RX ORDER — BUSPIRONE HYDROCHLORIDE 10 MG/1
10 TABLET ORAL 2 TIMES DAILY
Qty: 180 TABLET | Refills: 1 | Status: SHIPPED | OUTPATIENT
Start: 2024-09-03

## 2024-09-06 ENCOUNTER — TELEPHONE (OUTPATIENT)
Dept: MAMMOGRAPHY | Facility: CLINIC | Age: 44
End: 2024-09-06

## 2024-09-09 ENCOUNTER — TELEPHONE (OUTPATIENT)
Age: 44
End: 2024-09-09

## 2024-09-09 NOTE — TELEPHONE ENCOUNTER
9/9/24 CALLED PT AND LMOM FOR HER TO CB TO OFFER TO RESCHEDULE CANCELLED APPT WITH DR LUCAS;  Appointment canceled for Alejandra Cope ([id number])   Visit type: OFFICE VISIT SHORT [facility]   9/9/2024 8:45 AM (30 minutes) with Dilia Lucas MD in [facility]      Reason for cancellation: Canceled via MyChart

## 2024-09-11 DIAGNOSIS — M79.2 NEUROPATHIC PAIN: ICD-10-CM

## 2024-09-11 RX ORDER — GABAPENTIN 300 MG/1
300 CAPSULE ORAL 3 TIMES DAILY
Qty: 90 CAPSULE | Refills: 0 | Status: SHIPPED | OUTPATIENT
Start: 2024-09-11

## 2024-09-24 ENCOUNTER — TELEPHONE (OUTPATIENT)
Age: 44
End: 2024-09-24

## 2024-09-24 NOTE — TELEPHONE ENCOUNTER
9/24/24 CALLED PT AND LMOM FOR HER TO CB TO OFFER TO RESCHEDULE CANCELLED APPT  Appointment canceled for Alejandra Cope ([id number])   Visit type: OFFICE VISIT SHORT [facility]   9/23/2024 8:45 AM (30 minutes) [facility]      Reason for cancellation: Canceled via MyChart      None

## 2024-10-11 DIAGNOSIS — M79.2 NEUROPATHIC PAIN: ICD-10-CM

## 2024-10-14 RX ORDER — GABAPENTIN 300 MG/1
300 CAPSULE ORAL 3 TIMES DAILY
Qty: 90 CAPSULE | Refills: 0 | Status: SHIPPED | OUTPATIENT
Start: 2024-10-14

## 2024-10-31 ENCOUNTER — TELEPHONE (OUTPATIENT)
Age: 44
End: 2024-10-31

## 2024-10-31 NOTE — TELEPHONE ENCOUNTER
10/31/24 CALLED PT AND LMOM FOR HER TO CB TO OFFER TO RESCHEDULE 3 MONTH F/U WITH MARIA M    Appointment canceled for Alejandra Cope ([id number])   Visit type: OFFICE VISIT SHORT [facility]   10/30/2024 8:45 AM (30 minutes) with Dilia Lucas MD in [facility] [place]      Reason for cancellation: Canceled via MyChart

## 2024-11-01 ENCOUNTER — TELEPHONE (OUTPATIENT)
Dept: MAMMOGRAPHY | Facility: CLINIC | Age: 44
End: 2024-11-01

## 2024-11-05 DIAGNOSIS — M79.2 NEUROPATHIC PAIN: ICD-10-CM

## 2024-11-06 RX ORDER — GABAPENTIN 300 MG/1
300 CAPSULE ORAL 3 TIMES DAILY
Qty: 90 CAPSULE | Refills: 5 | Status: SHIPPED | OUTPATIENT
Start: 2024-11-06

## 2024-11-20 ENCOUNTER — OFFICE VISIT (OUTPATIENT)
Dept: INTERNAL MEDICINE CLINIC | Facility: CLINIC | Age: 44
End: 2024-11-20
Payer: COMMERCIAL

## 2024-11-20 VITALS
OXYGEN SATURATION: 99 % | TEMPERATURE: 96.8 F | WEIGHT: 268.13 LBS | HEIGHT: 64 IN | HEART RATE: 67 BPM | DIASTOLIC BLOOD PRESSURE: 80 MMHG | SYSTOLIC BLOOD PRESSURE: 122 MMHG | BODY MASS INDEX: 45.78 KG/M2

## 2024-11-20 DIAGNOSIS — E55.9 VITAMIN D DEFICIENCY: ICD-10-CM

## 2024-11-20 DIAGNOSIS — Z13.0 SCREENING FOR DEFICIENCY ANEMIA: ICD-10-CM

## 2024-11-20 DIAGNOSIS — Z13.29 SCREENING FOR THYROID DISORDER: ICD-10-CM

## 2024-11-20 DIAGNOSIS — Z23 ENCOUNTER FOR IMMUNIZATION: ICD-10-CM

## 2024-11-20 DIAGNOSIS — Z13.1 SCREENING FOR DIABETES MELLITUS: ICD-10-CM

## 2024-11-20 DIAGNOSIS — Z00.00 WELL ADULT EXAM: ICD-10-CM

## 2024-11-20 DIAGNOSIS — Z12.31 ENCOUNTER FOR SCREENING MAMMOGRAM FOR BREAST CANCER: ICD-10-CM

## 2024-11-20 DIAGNOSIS — Z13.220 SCREENING, LIPID: ICD-10-CM

## 2024-11-20 DIAGNOSIS — F32.1 CURRENT MODERATE EPISODE OF MAJOR DEPRESSIVE DISORDER WITHOUT PRIOR EPISODE (HCC): Primary | ICD-10-CM

## 2024-11-20 PROCEDURE — 99213 OFFICE O/P EST LOW 20 MIN: CPT | Performed by: PHYSICIAN ASSISTANT

## 2024-11-20 PROCEDURE — 90471 IMMUNIZATION ADMIN: CPT | Performed by: PHYSICIAN ASSISTANT

## 2024-11-20 PROCEDURE — 90656 IIV3 VACC NO PRSV 0.5 ML IM: CPT | Performed by: PHYSICIAN ASSISTANT

## 2024-11-20 PROCEDURE — 99396 PREV VISIT EST AGE 40-64: CPT | Performed by: PHYSICIAN ASSISTANT

## 2024-11-20 RX ORDER — VENLAFAXINE HYDROCHLORIDE 75 MG/1
75 CAPSULE, EXTENDED RELEASE ORAL
Qty: 30 CAPSULE | Refills: 5 | Status: SHIPPED | OUTPATIENT
Start: 2024-11-20

## 2024-11-20 NOTE — PROGRESS NOTES
Adult Annual Physical  Name: Alejandra Cope      : 1980      MRN: [id number]  Encounter Provider: Reba Mckinley PA-C  Encounter Date: 2024   Encounter department: [facility]    Assessment & Plan  Encounter for screening mammogram for breast cancer    Orders:    Mammo screening bilateral w 3d and cad; Future    Encounter for immunization    Orders:    influenza vaccine preservative-free 0.5 mL IM (Fluzone, Afluria, Fluarix, Flulaval)    Current moderate episode of major depressive disorder without prior episode (HCC)  Depression Screening Follow-up Plan: Patient's depression screening was positive with a PHQ-9 score of 12. Continue regular follow-up with their psychologist/therapist/psychiatrist who is managing their mental health condition(s).  Continue buspar. Start Effexor. Pt previously tried and failed cymbalta, lexapro, wellbutrin, prozac and zoloft. Directions for use and possible side effects discussed and patient verbalized understanding of these.      Orders:    venlafaxine (EFFEXOR-XR) 75 mg 24 hr capsule; Take 1 capsule (75 mg total) by mouth daily with breakfast    Vitamin D deficiency    Orders:    Vitamin D 25 hydroxy; Future    Screening, lipid    Orders:    Lipid panel; Future    Screening for thyroid disorder    Orders:    TSH, 3rd generation; Future    Screening for deficiency anemia    Orders:    CBC and differential; Future    Screening for diabetes mellitus    Orders:    Comprehensive metabolic panel; Future    Immunizations and preventive care screenings were discussed with patient today. Appropriate education was printed on patient's after visit summary.    Counseling:  Mammogram and labs ordered. Flu vaccine given.       Depression Screening and Follow-up Plan: Patient's depression screening was positive with a PHQ-9 score of 12. Continue regular follow-up with their mental health provider who is managing their mental health condition(s).         History of  Present Illness     Adult Annual Physical:  Patient presents for annual physical.     Diet and Physical Activity:  - Diet/Nutrition: poor diet.  - Exercise: walking.    Depression Screening:    - PHQ-9 Score: 12    General Health:  - Sleep: sleeps well.  - Hearing: normal hearing bilateral ears.  - Vision: goes for regular eye exams.  - Dental: regular dental visits.    /GYN Health:  - Follows with GYN: yes.   - Menopause: premenopausal.   - History of STDs: no    Advanced Care Planning:  - Has an advanced directive?: no    - Has a durable medical POA?: no    - ACP document given to patient?: no      Review of Systems   Constitutional:  Negative for chills and fever.   HENT:  Negative for congestion, ear pain, hearing loss, postnasal drip, rhinorrhea, sinus pressure, sinus pain, sore throat and trouble swallowing.    Eyes:  Negative for pain and visual disturbance.   Respiratory:  Negative for cough, chest tightness, shortness of breath and wheezing.    Cardiovascular: Negative.  Negative for chest pain, palpitations and leg swelling.   Gastrointestinal:  Negative for abdominal pain, blood in stool, constipation, diarrhea, nausea and vomiting.   Endocrine: Negative for cold intolerance, heat intolerance, polydipsia, polyphagia and polyuria.   Genitourinary:  Negative for difficulty urinating, dysuria, flank pain and urgency.   Musculoskeletal:  Negative for arthralgias, back pain, gait problem and myalgias.   Skin:  Negative for rash.   Allergic/Immunologic: Negative.    Neurological:  Negative for dizziness, weakness, light-headedness and headaches.   Hematological: Negative.    Psychiatric/Behavioral:  Positive for dysphoric mood. Negative for behavioral problems and sleep disturbance. The patient is not nervous/anxious.      Current Outpatient Medications on File Prior to Visit   Medication Sig Dispense Refill    albuterol (PROVENTIL HFA,VENTOLIN HFA) 90 mcg/act inhaler INHALE 2 PUFFS EVERY 6 HOURS AS NEEDED  "FOR WHEEZING 18 g 2    busPIRone (BUSPAR) 10 mg tablet TAKE 1 TABLET BY MOUTH TWICE A  tablet 1    gabapentin (NEURONTIN) 300 mg capsule TAKE 1 CAPSULE BY MOUTH THREE TIMES A DAY 90 capsule 5    lisinopril (ZESTRIL) 30 mg tablet TAKE 1 TABLET BY MOUTH EVERY DAY 90 tablet 1    omeprazole (PriLOSEC) 20 mg delayed release capsule TAKE 1 CAPSULE BY MOUTH EVERY  capsule 1    [DISCONTINUED] Trintellix 10 MG tablet TAKE 1 TABLET BY MOUTH EVERY DAY 30 tablet 5    acetaminophen (TYLENOL) 325 mg tablet Take 3 tablets (975 mg total) by mouth every 8 (eight) hours (Patient taking differently: Take 975 mg by mouth as needed)       Current Facility-Administered Medications on File Prior to Visit   Medication Dose Route Frequency Provider Last Rate Last Admin    hydrocortisone (ANUSOL-HC) 2.5 % rectal cream   Topical 4x Daily PRN Jacob Casper MD          Social History     Tobacco Use    Smoking status: Former     Types: Cigars     Passive exposure: Current (rare exposure when drinking)    Smokeless tobacco: Never    Tobacco comments:     Rare occasional use of cigar smoking socially, no longer uses    Vaping Use    Vaping status: Never Used   Substance and Sexual Activity    Alcohol use: Yes     Comment: socially - not even monthly    Drug use: No    Sexual activity: Yes     Partners: Male     Birth control/protection: None       Objective   /80 (BP Location: Right arm, Patient Position: Sitting)   Pulse 67   Temp (!) 96.8 °F (36 °C) (Tympanic)   Ht 5' 4\" (1.626 m)   Wt 122 kg (268 lb 2 oz)   LMP 12/01/2016   SpO2 99%   BMI 46.02 kg/m²     Physical Exam  Vitals and nursing note reviewed.   Constitutional:       Appearance: She is well-developed. She is obese.   HENT:      Head: Normocephalic and atraumatic.      Right Ear: External ear normal.      Left Ear: External ear normal.      Nose: Nose normal.   Eyes:      Conjunctiva/sclera: Conjunctivae normal.   Cardiovascular:      Rate and Rhythm: Normal " rate and regular rhythm.      Heart sounds: Normal heart sounds.   Pulmonary:      Effort: Pulmonary effort is normal.      Breath sounds: Normal breath sounds.   Abdominal:      General: Bowel sounds are normal.      Palpations: Abdomen is soft.   Musculoskeletal:         General: Normal range of motion.      Cervical back: Normal range of motion and neck supple.   Skin:     General: Skin is warm and dry.   Neurological:      Mental Status: She is alert and oriented to person, place, and time.   Psychiatric:         Mood and Affect: Mood is depressed.         Behavior: Behavior normal.         Thought Content: Thought content normal.         Judgment: Judgment normal.       Administrative Statements   I have spent a total time of 15 minutes in caring for this patient on the day of the visit/encounter including Risks and benefits of tx options, Instructions for management, Patient and family education, Importance of tx compliance, Risk factor reductions, Impressions, Counseling / Coordination of care, Documenting in the medical record, and Reviewing / ordering tests, medicine, procedures  .

## 2024-11-20 NOTE — ASSESSMENT & PLAN NOTE
Depression Screening Follow-up Plan: Patient's depression screening was positive with a PHQ-9 score of 12. Continue regular follow-up with their psychologist/therapist/psychiatrist who is managing their mental health condition(s).  Continue buspar. Start Effexor. Pt previously tried and failed cymbalta, lexapro, wellbutrin, prozac and zoloft. Directions for use and possible side effects discussed and patient verbalized understanding of these.      Orders:    venlafaxine (EFFEXOR-XR) 75 mg 24 hr capsule; Take 1 capsule (75 mg total) by mouth daily with breakfast

## 2024-12-10 ENCOUNTER — OFFICE VISIT (OUTPATIENT)
Dept: NEUROSURGERY | Facility: CLINIC | Age: 44
End: 2024-12-10
Payer: COMMERCIAL

## 2024-12-10 VITALS
BODY MASS INDEX: 45.75 KG/M2 | OXYGEN SATURATION: 99 % | RESPIRATION RATE: 17 BRPM | HEIGHT: 64 IN | TEMPERATURE: 98.1 F | WEIGHT: 268 LBS | SYSTOLIC BLOOD PRESSURE: 124 MMHG | DIASTOLIC BLOOD PRESSURE: 80 MMHG | HEART RATE: 73 BPM

## 2024-12-10 DIAGNOSIS — Z48.89 AFTERCARE FOLLOWING SURGERY FOR INJURY AND TRAUMA: Primary | ICD-10-CM

## 2024-12-10 PROCEDURE — 99213 OFFICE O/P EST LOW 20 MIN: CPT | Performed by: SPECIALIST

## 2024-12-10 NOTE — PROGRESS NOTES
Name: Alejandra Cope      : 1980      MRN: [id number]  Encounter Provider: Dilia Lucas MD  Encounter Date: 12/10/2024   [facility]  :  Assessment & Plan      Patient returns after undergoing a C4-5 and C5-6 anterior cervical discectomy and fusion back in 2024.  Overall she says she is doing well but does have occasional aches and pains especially when she gets home from work.  She states that she has some days that are better than others, but when she takes time off, she feels better.  I explained to her that it could take up to a year to get the full benefits of surgery.  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  She states that she would like to have another job, but it is not easy to find one.  I reassured her that a lot of the complaints that she is telling me seem to be more musculoskeletal as opposed to any red flags.  The patient also looks extremely comfortable here in the office, which is much improved than her preoperative status.  Overall I am pleased with the results of her surgery.  I did tell her that it can take up to a year to get the full benefits.  She can notify our office should she have any further questions or concerns.  She will follow-up with us on an as-needed basis.      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 Does get back and neck pain, especially after work. Otherwise she does look better to me than she did before surgery.      Review of Systems   HENT:  Negative for trouble swallowing.    Genitourinary:  Negative for enuresis.   Musculoskeletal:  Positive for back pain (mid back pain starting at neck) and neck pain (neck pain into right shoulder and bicep). Negative for myalgias and neck stiffness.   Neurological:  Negative for weakness and numbness.  "  Hematological:  Does not bruise/bleed easily.   All other systems reviewed and are negative.   I have personally reviewed the MA's review of systems and made changes as necessary.    No new imaging         Objective   Resp 17   Ht 5' 4\" (1.626 m)   Wt 122 kg (268 lb)   LMP 12/01/2016   BMI 46.00 kg/m²     Physical Exam  Neurological Exam    Alert, oriented, fluent, MAEW 5/5  No hoarseness or swallowing problems.   Incision healed.           "

## 2024-12-15 DIAGNOSIS — F32.1 CURRENT MODERATE EPISODE OF MAJOR DEPRESSIVE DISORDER WITHOUT PRIOR EPISODE (HCC): ICD-10-CM

## 2024-12-16 RX ORDER — VENLAFAXINE HYDROCHLORIDE 75 MG/1
CAPSULE, EXTENDED RELEASE ORAL
Qty: 90 CAPSULE | Refills: 1 | Status: SHIPPED | OUTPATIENT
Start: 2024-12-16

## 2025-01-09 DIAGNOSIS — I10 ESSENTIAL HYPERTENSION: ICD-10-CM

## 2025-01-10 ENCOUNTER — TELEPHONE (OUTPATIENT)
Dept: MAMMOGRAPHY | Facility: CLINIC | Age: 45
End: 2025-01-10

## 2025-01-10 RX ORDER — LISINOPRIL 30 MG/1
30 TABLET ORAL DAILY
Qty: 90 TABLET | Refills: 1 | Status: SHIPPED | OUTPATIENT
Start: 2025-01-10

## 2025-01-10 NOTE — TELEPHONE ENCOUNTER
Attempted to call  patient to schedule a bilateral diagnostic mammogram, per 10/19/23 report pt due for R dx mammo in 6 months, left name/#/office hours with req for cb.

## 2025-01-23 DIAGNOSIS — F32.1 CURRENT MODERATE EPISODE OF MAJOR DEPRESSIVE DISORDER WITHOUT PRIOR EPISODE (HCC): ICD-10-CM

## 2025-01-23 DIAGNOSIS — K21.00 GASTROESOPHAGEAL REFLUX DISEASE WITH ESOPHAGITIS: ICD-10-CM

## 2025-01-23 RX ORDER — BUSPIRONE HYDROCHLORIDE 10 MG/1
10 TABLET ORAL 2 TIMES DAILY
Qty: 180 TABLET | Refills: 1 | Status: SHIPPED | OUTPATIENT
Start: 2025-01-23

## 2025-02-03 ENCOUNTER — PATIENT MESSAGE (OUTPATIENT)
Dept: INTERNAL MEDICINE CLINIC | Facility: CLINIC | Age: 45
End: 2025-02-03

## 2025-02-04 ENCOUNTER — TELEPHONE (OUTPATIENT)
Dept: INTERNAL MEDICINE CLINIC | Facility: CLINIC | Age: 45
End: 2025-02-04

## 2025-03-09 DIAGNOSIS — I10 ESSENTIAL HYPERTENSION: ICD-10-CM

## 2025-03-09 DIAGNOSIS — K21.00 GASTROESOPHAGEAL REFLUX DISEASE WITH ESOPHAGITIS: ICD-10-CM

## 2025-03-09 DIAGNOSIS — F32.1 CURRENT MODERATE EPISODE OF MAJOR DEPRESSIVE DISORDER WITHOUT PRIOR EPISODE (HCC): ICD-10-CM

## 2025-03-10 ENCOUNTER — TELEPHONE (OUTPATIENT)
Age: 45
End: 2025-03-10

## 2025-03-10 RX ORDER — OMEPRAZOLE 20 MG/1
20 CAPSULE, DELAYED RELEASE ORAL DAILY
Qty: 100 CAPSULE | Refills: 1 | Status: SHIPPED | OUTPATIENT
Start: 2025-03-10

## 2025-03-10 RX ORDER — LISINOPRIL 30 MG/1
30 TABLET ORAL DAILY
Qty: 90 TABLET | Refills: 1 | Status: SHIPPED | OUTPATIENT
Start: 2025-03-10

## 2025-03-10 RX ORDER — BUSPIRONE HYDROCHLORIDE 10 MG/1
10 TABLET ORAL 2 TIMES DAILY
Qty: 180 TABLET | Refills: 0 | OUTPATIENT
Start: 2025-03-10

## 2025-03-10 NOTE — TELEPHONE ENCOUNTER
Patient has been added to the [facility] wait list without a referral.    Insurance: [facility]  Insurance Type:    Commercial [x]   Medicaid [x]   [facility] (if applicable)   Medicare []  Location Preference: [place]  Provider Preference: female  Virtual: Yes [x] No []  Were outside resources sent: Yes [] No [x]

## 2025-03-13 ENCOUNTER — HOSPITAL ENCOUNTER (OUTPATIENT)
Dept: MAMMOGRAPHY | Facility: HOSPITAL | Age: 45
Discharge: HOME | End: 2025-03-13
Payer: COMMERCIAL

## 2025-03-13 VITALS — BODY MASS INDEX: 45.75 KG/M2 | WEIGHT: 268 LBS | HEIGHT: 64 IN

## 2025-03-13 DIAGNOSIS — R92.8 ABNORMAL MAMMOGRAM: ICD-10-CM

## 2025-03-13 PROCEDURE — G0279 TOMOSYNTHESIS, MAMMO: HCPCS

## 2025-03-13 PROCEDURE — 77066 DX MAMMO INCL CAD BI: CPT

## 2025-03-14 ENCOUNTER — OFFICE VISIT (OUTPATIENT)
Dept: INTERNAL MEDICINE CLINIC | Facility: CLINIC | Age: 45
End: 2025-03-14
Payer: COMMERCIAL

## 2025-03-14 VITALS
HEIGHT: 64 IN | BODY MASS INDEX: 45.58 KG/M2 | SYSTOLIC BLOOD PRESSURE: 124 MMHG | DIASTOLIC BLOOD PRESSURE: 82 MMHG | OXYGEN SATURATION: 99 % | HEART RATE: 66 BPM | WEIGHT: 267 LBS | TEMPERATURE: 97.8 F

## 2025-03-14 DIAGNOSIS — E66.813 CLASS 3 SEVERE OBESITY DUE TO EXCESS CALORIES WITHOUT SERIOUS COMORBIDITY WITH BODY MASS INDEX (BMI) OF 40.0 TO 44.9 IN ADULT (HCC): ICD-10-CM

## 2025-03-14 DIAGNOSIS — E66.01 CLASS 3 SEVERE OBESITY DUE TO EXCESS CALORIES WITHOUT SERIOUS COMORBIDITY WITH BODY MASS INDEX (BMI) OF 40.0 TO 44.9 IN ADULT (HCC): ICD-10-CM

## 2025-03-14 DIAGNOSIS — J45.20 MILD INTERMITTENT ASTHMA, UNSPECIFIED WHETHER COMPLICATED: ICD-10-CM

## 2025-03-14 DIAGNOSIS — F33.41 RECURRENT MAJOR DEPRESSIVE DISORDER, IN PARTIAL REMISSION (HCC): Primary | ICD-10-CM

## 2025-03-14 PROCEDURE — 99214 OFFICE O/P EST MOD 30 MIN: CPT | Performed by: PHYSICIAN ASSISTANT

## 2025-03-14 RX ORDER — BUPROPION HYDROCHLORIDE 150 MG/1
150 TABLET, EXTENDED RELEASE ORAL 2 TIMES DAILY
Qty: 60 TABLET | Refills: 2 | Status: SHIPPED | OUTPATIENT
Start: 2025-03-14

## 2025-03-14 RX ORDER — TOPIRAMATE 25 MG/1
25 TABLET, FILM COATED ORAL 2 TIMES DAILY
Qty: 60 TABLET | Refills: 2 | Status: SHIPPED | OUTPATIENT
Start: 2025-03-14

## 2025-03-14 RX ORDER — ALBUTEROL SULFATE 90 UG/1
1 INHALANT RESPIRATORY (INHALATION) EVERY 6 HOURS PRN
Qty: 18 G | Refills: 2 | Status: SHIPPED | OUTPATIENT
Start: 2025-03-14

## 2025-03-14 RX ORDER — FLUTICASONE PROPIONATE AND SALMETEROL 250; 50 UG/1; UG/1
1 POWDER RESPIRATORY (INHALATION) 2 TIMES DAILY
Qty: 60 BLISTER | Refills: 1 | Status: SHIPPED | OUTPATIENT
Start: 2025-03-14

## 2025-03-17 PROBLEM — R07.2 PRECORDIAL PAIN: Status: RESOLVED | Noted: 2019-03-02 | Resolved: 2025-03-17

## 2025-03-17 NOTE — ASSESSMENT & PLAN NOTE
Start wellbutrin and topamax. Directions for use and possible side effects discussed and patient verbalized understanding of these.    Orders:  •  buPROPion (Wellbutrin SR) 150 mg 12 hr tablet; Take 1 tablet (150 mg total) by mouth 2 (two) times a day  •  topiramate (Topamax) 25 mg tablet; Take 1 tablet (25 mg total) by mouth 2 (two) times a day

## 2025-03-17 NOTE — ASSESSMENT & PLAN NOTE
Depression Screening Follow-up Plan: Patient's depression screening was positive with a PHQ-9 score of 11. Continue regular follow-up with their psychologist/therapist/psychiatrist who is managing their mental health condition(s).  Start wellbutrin. This may help pts depression and assist weight loss efforts. Directions for use and possible side effects discussed and patient verbalized understanding of these.        Orders:  •  buPROPion (Wellbutrin SR) 150 mg 12 hr tablet; Take 1 tablet (150 mg total) by mouth 2 (two) times a day  •  topiramate (Topamax) 25 mg tablet; Take 1 tablet (25 mg total) by mouth 2 (two) times a day

## 2025-03-17 NOTE — ASSESSMENT & PLAN NOTE
Start Advair. Directions for use and possible side effects discussed and patient verbalized understanding of these.    Orders:  •  albuterol (PROVENTIL HFA,VENTOLIN HFA) 90 mcg/act inhaler; Inhale 1 puff every 6 (six) hours as needed for wheezing  •  Fluticasone-Salmeterol (Advair Diskus) 250-50 mcg/dose inhaler; Inhale 1 puff 2 (two) times a day Rinse mouth after use.

## 2025-03-17 NOTE — PROGRESS NOTES
Name: Alejandra Cope      : 1980      MRN: [id number]  Encounter Provider: Reba Mckinley PA-C  Encounter Date: 3/14/2025   Encounter department: [facility]  :  Assessment & Plan  Recurrent major depressive disorder, in partial remission (HCC)  Depression Screening Follow-up Plan: Patient's depression screening was positive with a PHQ-9 score of 11. Continue regular follow-up with their psychologist/therapist/psychiatrist who is managing their mental health condition(s).  Start wellbutrin. This may help pts depression and assist weight loss efforts. Directions for use and possible side effects discussed and patient verbalized understanding of these.        Orders:  •  buPROPion (Wellbutrin SR) 150 mg 12 hr tablet; Take 1 tablet (150 mg total) by mouth 2 (two) times a day  •  topiramate (Topamax) 25 mg tablet; Take 1 tablet (25 mg total) by mouth 2 (two) times a day    Class 3 severe obesity due to excess calories without serious comorbidity with body mass index (BMI) of 40.0 to 44.9 in adult (HCC)  Start wellbutrin and topamax. Directions for use and possible side effects discussed and patient verbalized understanding of these.    Orders:  •  buPROPion (Wellbutrin SR) 150 mg 12 hr tablet; Take 1 tablet (150 mg total) by mouth 2 (two) times a day  •  topiramate (Topamax) 25 mg tablet; Take 1 tablet (25 mg total) by mouth 2 (two) times a day    Mild intermittent asthma, unspecified whether complicated  Start Advair. Directions for use and possible side effects discussed and patient verbalized understanding of these.    Orders:  •  albuterol (PROVENTIL HFA,VENTOLIN HFA) 90 mcg/act inhaler; Inhale 1 puff every 6 (six) hours as needed for wheezing  •  Fluticasone-Salmeterol (Advair Diskus) 250-50 mcg/dose inhaler; Inhale 1 puff 2 (two) times a day Rinse mouth after use.          Depression Screening and Follow-up Plan: Patient's depression screening was positive with a PHQ-9 score of 11.  "  Continue regular follow-up with their mental health provider who is managing their mental health condition(s).       History of Present Illness   Pt presents for routine visit. She is doing faily well overall. She is up to date with mammogram and CRC Screening. She is due for labs, orders previously placed. She was unable to get effexor due to cost. She previously tried and failed cymbalta, lexapro, wellbutrin, prozac, and zoloft. She is also frustrated with her weight. Insurance will not cover a GLP1. Lastly she notes her asthma has not been well controlled and she is relying on her rescue inhaler too much.       Review of Systems   Constitutional:  Negative for chills and fever.   HENT:  Negative for congestion, ear pain, hearing loss, postnasal drip, rhinorrhea, sinus pressure, sinus pain, sore throat and trouble swallowing.    Eyes:  Negative for pain and visual disturbance.   Respiratory:  Positive for shortness of breath and wheezing. Negative for cough and chest tightness.    Cardiovascular: Negative.  Negative for chest pain, palpitations and leg swelling.   Gastrointestinal:  Negative for abdominal pain, blood in stool, constipation, diarrhea, nausea and vomiting.   Endocrine: Negative for cold intolerance, heat intolerance, polydipsia, polyphagia and polyuria.   Genitourinary:  Negative for difficulty urinating, dysuria, flank pain and urgency.   Musculoskeletal:  Negative for arthralgias, back pain, gait problem and myalgias.   Skin:  Negative for rash.   Allergic/Immunologic: Negative.    Neurological:  Negative for dizziness, weakness, light-headedness and headaches.   Hematological: Negative.    Psychiatric/Behavioral:  Positive for dysphoric mood. Negative for behavioral problems and sleep disturbance. The patient is not nervous/anxious.        Objective   /82 (Patient Position: Sitting, Cuff Size: Large)   Pulse 66   Temp 97.8 °F (36.6 °C) (Tympanic)   Ht 5' 4\" (1.626 m)   Wt 121 kg (267 " lb)   [facility] 12/01/2016   SpO2 99%   BMI 45.83 kg/m²      Physical Exam  Vitals and nursing note reviewed.   Constitutional:       General: She is not in acute distress.     Appearance: Normal appearance. She is well-developed. She is obese. She is not diaphoretic.   HENT:      Head: Normocephalic and atraumatic.      Right Ear: External ear normal.      Left Ear: External ear normal.      Nose: Nose normal.      Mouth/Throat:      Pharynx: No oropharyngeal exudate.   Eyes:      General: No scleral icterus.        Right eye: No discharge.         Left eye: No discharge.      Conjunctiva/sclera: Conjunctivae normal.      Pupils: Pupils are equal, round, and reactive to light.   Neck:      Thyroid: No thyromegaly.   Cardiovascular:      Rate and Rhythm: Normal rate and regular rhythm.      Heart sounds: Normal heart sounds. No murmur heard.     No friction rub. No gallop.   Pulmonary:      Effort: Pulmonary effort is normal. No respiratory distress.      Breath sounds: Normal breath sounds. No wheezing or rales.   Abdominal:      General: Bowel sounds are normal. There is no distension.      Palpations: Abdomen is soft.      Tenderness: There is no abdominal tenderness.   Musculoskeletal:         General: No tenderness or deformity. Normal range of motion.      Cervical back: Normal range of motion and neck supple.   Skin:     General: Skin is warm and dry.   Neurological:      Mental Status: She is alert and oriented to person, place, and time.      Cranial Nerves: No cranial nerve deficit.   Psychiatric:         Mood and Affect: Mood is anxious and depressed.         Behavior: Behavior normal.         Thought Content: Thought content normal.         Judgment: Judgment normal.

## 2025-04-06 DIAGNOSIS — E66.813 CLASS 3 SEVERE OBESITY DUE TO EXCESS CALORIES WITHOUT SERIOUS COMORBIDITY WITH BODY MASS INDEX (BMI) OF 40.0 TO 44.9 IN ADULT: ICD-10-CM

## 2025-04-06 DIAGNOSIS — F33.41 RECURRENT MAJOR DEPRESSIVE DISORDER, IN PARTIAL REMISSION (HCC): ICD-10-CM

## 2025-04-07 RX ORDER — BUPROPION HYDROCHLORIDE 150 MG/1
150 TABLET, EXTENDED RELEASE ORAL 2 TIMES DAILY
Qty: 180 TABLET | Refills: 1 | Status: SHIPPED | OUTPATIENT
Start: 2025-04-07

## 2025-06-07 DIAGNOSIS — F33.41 RECURRENT MAJOR DEPRESSIVE DISORDER, IN PARTIAL REMISSION (HCC): ICD-10-CM

## 2025-06-07 DIAGNOSIS — E66.813 CLASS 3 SEVERE OBESITY DUE TO EXCESS CALORIES WITHOUT SERIOUS COMORBIDITY WITH BODY MASS INDEX (BMI) OF 40.0 TO 44.9 IN ADULT: ICD-10-CM

## 2025-06-08 RX ORDER — TOPIRAMATE 25 MG/1
25 TABLET, FILM COATED ORAL 2 TIMES DAILY
Qty: 60 TABLET | Refills: 0 | Status: SHIPPED | OUTPATIENT
Start: 2025-06-08

## 2025-06-26 ENCOUNTER — APPOINTMENT (OUTPATIENT)
Dept: URGENT CARE | Facility: CLINIC | Age: 45
End: 2025-06-26

## 2025-07-03 DIAGNOSIS — F33.41 RECURRENT MAJOR DEPRESSIVE DISORDER, IN PARTIAL REMISSION (HCC): ICD-10-CM

## 2025-07-03 DIAGNOSIS — M79.2 NEUROPATHIC PAIN: ICD-10-CM

## 2025-07-03 DIAGNOSIS — E66.813 CLASS 3 SEVERE OBESITY DUE TO EXCESS CALORIES WITHOUT SERIOUS COMORBIDITY WITH BODY MASS INDEX (BMI) OF 40.0 TO 44.9 IN ADULT: ICD-10-CM

## 2025-07-06 RX ORDER — GABAPENTIN 300 MG/1
300 CAPSULE ORAL 3 TIMES DAILY
Qty: 90 CAPSULE | Refills: 0 | Status: SHIPPED | OUTPATIENT
Start: 2025-07-06

## 2025-07-06 NOTE — TELEPHONE ENCOUNTER
Patient needs updated blood work and has previously placed orders. Please contact patient to go for labs. Courtesy refill provided.   Pt needs GFR done

## 2025-07-07 RX ORDER — TOPIRAMATE 25 MG/1
25 TABLET, FILM COATED ORAL 2 TIMES DAILY
Qty: 60 TABLET | Refills: 5 | Status: SHIPPED | OUTPATIENT
Start: 2025-07-07

## 2025-07-23 ENCOUNTER — NURSE TRIAGE (OUTPATIENT)
Age: 45
End: 2025-07-23

## 2025-07-23 NOTE — TELEPHONE ENCOUNTER
Regarding: sun burn  ----- Message from Jodi CHESTER sent at 7/23/2025  1:49 PM EDT -----  My Chart message 7/22   I had gotten really bad sunburn and it was on parts of my chest as well. It went away but I have a lump on the right on with little ones with it and it's purple in color. It does hurt when I touch it or press on it. I didn't know if I could see u or not for it? I do have new insurance now! It also hurts when I wear a bra and it's pressing against it.

## 2025-07-23 NOTE — TELEPHONE ENCOUNTER
"REASON FOR CONVERSATION: Sunburn    SYMPTOMS: Pt has a lump//blister where sun burn was on breat. Pt states pain rate is a 6 out of 10. Pt reports the blister turned purple and she is nauseated since the sunburn. Pt admits pushing fluids and doesn't have fever.     OTHER HEALTH INFORMATION: n/a    PROTOCOL DISPOSITION: See Today in Office    CARE ADVICE PROVIDED: Triage nurse made pt an appt for 7/24, soonest available since so late in day.    PRACTICE FOLLOW-UP: call pt back if sooner appt becomes available.         Reason for Disposition   SEVERE sunburn pain (e.g., excruciating)    Answer Assessment - Initial Assessment Questions  1. APPEARANCE of SKIN: \"What does it look like?\" \"Where is the sunburn located?\"       Purple/lump developed after burn healed   2. BLISTERS: \"Are there any blisters?\" If Yes, ask: \"Where are they located?\" \"How big are they?\" \"Are they closed or broken?\"       Denies-closed   3. EXTENT: \"How much of the body has a sunburn?\" \"How large is the area of blistering?\" (can compare to the size of their palm)      Breast region  4. ONSET: \"When did the sun exposure occur?\" (e.g., hours or days ago)       Today worsened   5. PAIN: \"How painful is the sunburn?\"  (Scale 1-10; mild, moderate or severe)      6  6. OTHER SYMPTOMS: \"Do you have any other symptoms?\" (e.g., dizziness, eye pain, fever, nausea)      Nauseous   7. PREGNANCY: \"Is there any chance you are pregnant?\" \"When was your last menstrual period?\"      Denies-Hysterectomy    Protocols used: Sunburn-Adult-[place]    "

## 2025-07-24 ENCOUNTER — TELEPHONE (OUTPATIENT)
Age: 45
End: 2025-07-24

## 2025-07-24 ENCOUNTER — OFFICE VISIT (OUTPATIENT)
Dept: INTERNAL MEDICINE CLINIC | Facility: CLINIC | Age: 45
End: 2025-07-24
Payer: COMMERCIAL

## 2025-07-24 VITALS
WEIGHT: 264.8 LBS | TEMPERATURE: 97.8 F | OXYGEN SATURATION: 99 % | DIASTOLIC BLOOD PRESSURE: 84 MMHG | BODY MASS INDEX: 45.21 KG/M2 | HEIGHT: 64 IN | SYSTOLIC BLOOD PRESSURE: 126 MMHG | HEART RATE: 93 BPM

## 2025-07-24 DIAGNOSIS — L98.9 SKIN LESION: ICD-10-CM

## 2025-07-24 DIAGNOSIS — N64.4 BREAST PAIN, RIGHT: ICD-10-CM

## 2025-07-24 DIAGNOSIS — N63.12 MASS OF UPPER INNER QUADRANT OF RIGHT BREAST: Primary | ICD-10-CM

## 2025-07-24 PROCEDURE — 99214 OFFICE O/P EST MOD 30 MIN: CPT | Performed by: PHYSICAL MEDICINE & REHABILITATION

## 2025-07-24 RX ORDER — SULFAMETHOXAZOLE AND TRIMETHOPRIM 800; 160 MG/1; MG/1
1 TABLET ORAL EVERY 12 HOURS SCHEDULED
Qty: 14 TABLET | Refills: 0 | Status: SHIPPED | OUTPATIENT
Start: 2025-07-24 | End: 2025-07-31

## 2025-07-24 NOTE — PROGRESS NOTES
Name: Alejandra Cope      : 1980      MRN: [id number]  Encounter Provider: Monse Porras PA-C  Encounter Date: 2025   Encounter department: [facility]  :  Assessment & Plan  Breast pain, right    Orders:    US breast right limited (diagnostic); Future    Ambulatory referral to Dermatology; Future    Mass of upper inner quadrant of right breast  Hx of breast cancer in family   Would recommend ultrasound to definitively rule out any masses in the breast   Orders:    US breast right limited (diagnostic); Future    Skin lesion  Low suspicion for abscess however cannot definitively exclude  No drainage to obtain wound culture  While pending work up will trial Bactrim  Opted for Bactrim to cover atypical and typical bacteria since I cannot obtain a culture   Opted for dermatology referral as lesion does appear suspicious with rolled borders   Prolonged appearance for approximately 1 month.  Lesion does not appear to be a resolving blister as it is more fixed, non-fluid filled. Approximately 1 cm in length with surrounding erythema   If lesion does not resolve with antibiotics would like further workup, biopsy of lesion   Orders:    sulfamethoxazole-trimethoprim (BACTRIM DS) 800-160 mg per tablet; Take 1 tablet by mouth every 12 (twelve) hours for 7 days    Ambulatory referral to Dermatology; Future           History of Present Illness   Alejandra is a 44 year old female presenting for a painful lesion/lump to the right inner portion of her breast. Approximately 1 month ago the patient got sunburn to her chest that developed into this lesion that has not resolved. She admits the lesion was dime sized to start and feels it may be getting bigger of late. She states the color of the lesion can vary between red, blue/black. No drainage from the lesion. She denies fever, chills. She admits to intermittent nausea, headaches. She admits the area is tender to touch and she is having difficulty sleeping  "if anything presses on the lesion. She is having difficulty wearing a bra due to the pain. The remainder of her sun burn has resolved within 2 weeks of initially getting burnt approximately 1 month ago. She did note she was wearing sunscreen and reapplying approximately every half hour. She has not noticed a lesion or pain to this area prior to. She does note breast cancer does run in her family. Last mammogram was 3/2025. Her right breast was noted to have some asymmetry to the right inner portion of the breast that was noted to be stable since her Mammogram in August 2023.       Review of Systems   Constitutional:  Positive for fatigue. Negative for chills and fever.        Fatigue- associated with pt getting 2nd full time job   Respiratory:  Positive for shortness of breath. Negative for cough and wheezing.         SOB- contributed with exertion and lack of sleep   Cardiovascular:  Positive for chest pain. Negative for palpitations and leg swelling.        Right inner breast lesion/lump/discomfort   Gastrointestinal:  Positive for nausea.   Genitourinary: Negative.    Skin:  Positive for color change.   Neurological:  Positive for headaches.   Psychiatric/Behavioral: Negative.         Objective   /84   Pulse 93   Temp 97.8 °F (36.6 °C)   Ht 5' 4\" (1.626 m)   Wt 120 kg (264 lb 12.8 oz)   [facility]MP 12/01/2016   SpO2 99%   BMI 45.45 kg/m²      Physical Exam  Vitals reviewed.   Constitutional:       General: She is not in acute distress.     Appearance: Normal appearance. She is not ill-appearing.     Cardiovascular:      Rate and Rhythm: Normal rate and regular rhythm.      Pulses: Normal pulses.      Heart sounds: Normal heart sounds. No murmur heard.  Pulmonary:      Effort: Pulmonary effort is normal. No respiratory distress.      Breath sounds: Normal breath sounds. No wheezing, rhonchi or rales.     Musculoskeletal:         General: Swelling and tenderness present.     Skin:     Findings: Erythema and " lesion present.      Comments: Right inner breast approximately 3-4 o'clock position with a erythematous red lesion with rolled borders, tender to touch approximately 1 cm in length   No active drainage  Surrounding erythema to central lesion      Neurological:      Mental Status: She is alert.     Psychiatric:         Mood and Affect: Mood normal.         Behavior: Behavior normal.

## 2025-07-24 NOTE — PATIENT INSTRUCTIONS
Take bactrim twice a day through completion - total 7 days   Proceed with right breast ultrasound  Schedule appointment with dermatology  [phone] - central scheduling to schedule for dermatology and ultrasound   Will follow up with ultrasound results  Reach out to us if Bactrim helps appearance of breast lesion

## 2025-07-24 NOTE — TELEPHONE ENCOUNTER
Patient called for a NP APT/ASAP referral  If you can please call her back/patient works two jobs and asked if we can leave detailed message if she doesn't .

## 2025-07-24 NOTE — TELEPHONE ENCOUNTER
Called and left message for patient.    Offered (& held) appt for 7/30/2025 @ 8:50 am with Dr. Butts in [place].    Regardless of where patients is scheduled, she's looking at minimum of one hour travel time (to be seen at [facility]).    I requested return call from patient regarding appointment offered.    Also informed patient that she did have the option of scheduling with an [facility] derm closer to her residence (due to travel time).    Phone number left in message.

## 2025-07-25 NOTE — TELEPHONE ENCOUNTER
Patient returned call and is thankful for the apt offered 7/30/25 @ 8:50 in [place] and would like to schedule it please.  Thank you

## 2025-07-29 ENCOUNTER — ANCILLARY ORDERS (OUTPATIENT)
Dept: INTERNAL MEDICINE CLINIC | Facility: CLINIC | Age: 45
End: 2025-07-29

## 2025-07-29 DIAGNOSIS — N64.4 BREAST PAIN, RIGHT: Primary | ICD-10-CM

## 2025-07-29 DIAGNOSIS — N63.12 MASS OF UPPER INNER QUADRANT OF RIGHT BREAST: ICD-10-CM

## 2025-07-30 ENCOUNTER — TELEPHONE (OUTPATIENT)
Dept: OTHER | Facility: OTHER | Age: 45
End: 2025-07-30

## 2025-08-07 ENCOUNTER — TELEPHONE (OUTPATIENT)
Dept: MAMMOGRAPHY | Facility: CLINIC | Age: 45
End: 2025-08-07

## 2025-08-07 DIAGNOSIS — E66.813 CLASS 3 SEVERE OBESITY DUE TO EXCESS CALORIES WITHOUT SERIOUS COMORBIDITY WITH BODY MASS INDEX (BMI) OF 40.0 TO 44.9 IN ADULT: ICD-10-CM

## 2025-08-07 DIAGNOSIS — F33.41 RECURRENT MAJOR DEPRESSIVE DISORDER, IN PARTIAL REMISSION (HCC): ICD-10-CM

## 2025-08-07 DIAGNOSIS — F32.1 CURRENT MODERATE EPISODE OF MAJOR DEPRESSIVE DISORDER WITHOUT PRIOR EPISODE (HCC): ICD-10-CM

## 2025-08-07 DIAGNOSIS — K21.00 GASTROESOPHAGEAL REFLUX DISEASE WITH ESOPHAGITIS: ICD-10-CM

## 2025-08-07 DIAGNOSIS — I10 ESSENTIAL HYPERTENSION: ICD-10-CM

## 2025-08-07 DIAGNOSIS — M79.2 NEUROPATHIC PAIN: ICD-10-CM

## 2025-08-08 RX ORDER — OMEPRAZOLE 20 MG/1
20 CAPSULE, DELAYED RELEASE ORAL DAILY
Qty: 100 CAPSULE | Refills: 0 | Status: SHIPPED | OUTPATIENT
Start: 2025-08-08

## 2025-08-08 RX ORDER — LISINOPRIL 30 MG/1
30 TABLET ORAL DAILY
Qty: 90 TABLET | Refills: 0 | Status: SHIPPED | OUTPATIENT
Start: 2025-08-08

## 2025-08-08 RX ORDER — TOPIRAMATE 25 MG/1
25 TABLET, FILM COATED ORAL 2 TIMES DAILY
Qty: 60 TABLET | Refills: 0 | Status: SHIPPED | OUTPATIENT
Start: 2025-08-08

## 2025-08-08 RX ORDER — BUSPIRONE HYDROCHLORIDE 10 MG/1
10 TABLET ORAL 2 TIMES DAILY
Qty: 180 TABLET | Refills: 0 | Status: SHIPPED | OUTPATIENT
Start: 2025-08-08

## 2025-08-08 RX ORDER — GABAPENTIN 300 MG/1
300 CAPSULE ORAL 3 TIMES DAILY
Qty: 90 CAPSULE | Refills: 0 | Status: SHIPPED | OUTPATIENT
Start: 2025-08-08

## 2025-08-08 RX ORDER — BUPROPION HYDROCHLORIDE 150 MG/1
150 TABLET, EXTENDED RELEASE ORAL 2 TIMES DAILY
Qty: 180 TABLET | Refills: 0 | Status: SHIPPED | OUTPATIENT
Start: 2025-08-08

## 2025-08-21 ENCOUNTER — HOSPITAL ENCOUNTER (OUTPATIENT)
Dept: ULTRASOUND IMAGING | Facility: HOSPITAL | Age: 45
Discharge: HOME | End: 2025-08-21
Attending: PHYSICAL MEDICINE & REHABILITATION
Payer: COMMERCIAL

## 2025-08-21 ENCOUNTER — RESULTS FOLLOW-UP (OUTPATIENT)
Dept: INTERNAL MEDICINE CLINIC | Facility: CLINIC | Age: 45
End: 2025-08-21

## 2025-08-21 ENCOUNTER — HOSPITAL ENCOUNTER (OUTPATIENT)
Dept: MAMMOGRAPHY | Facility: HOSPITAL | Age: 45
Discharge: HOME | End: 2025-08-21
Attending: PHYSICAL MEDICINE & REHABILITATION
Payer: COMMERCIAL

## 2025-08-21 VITALS — WEIGHT: 264 LBS | HEIGHT: 64 IN | BODY MASS INDEX: 45.07 KG/M2

## 2025-08-21 DIAGNOSIS — N64.4 BREAST PAIN, RIGHT: ICD-10-CM

## 2025-08-21 DIAGNOSIS — N63.12 MASS OF UPPER INNER QUADRANT OF RIGHT BREAST: ICD-10-CM

## 2025-08-21 PROCEDURE — 76642 ULTRASOUND BREAST LIMITED: CPT

## 2025-08-21 PROCEDURE — 77065 DX MAMMO INCL CAD UNI: CPT

## 2025-08-21 PROCEDURE — G0279 TOMOSYNTHESIS, MAMMO: HCPCS

## (undated) DEVICE — SUT MONOCRYL PLUS 4-0 PS-2 18 IN MCP496G

## (undated) DEVICE — GLOVE SRG BIOGEL 7.5

## (undated) DEVICE — SUT SILK 2-0 18 IN A185H

## (undated) DEVICE — DRAPE MICROSCOPE OPMI PENTERO

## (undated) DEVICE — SUT ETHILON 2-0 FSLX 30 IN 1674H

## (undated) DEVICE — HEMOSTATIC MATRIX SURGIFLO 8ML W/THROMBIN

## (undated) DEVICE — TELFA NON-ADHERENT ABSORBENT DRESSING: Brand: TELFA

## (undated) DEVICE — LIGHT HANDLE COVER SLEEVE DISP BLUE STELLAR

## (undated) DEVICE — SUPPLY FEE STD

## (undated) DEVICE — NEEDLE SPINAL18G X 3.5 IN QUINCKE

## (undated) DEVICE — 3M™ STERI-STRIP™ REINFORCED ADHESIVE SKIN CLOSURES, R1547, 1/2 IN X 4 IN (12 MM X 100 MM), 6 STRIPS/ENVELOPE: Brand: 3M™ STERI-STRIP™

## (undated) DEVICE — ROSEBUD DISSECTORS: Brand: DEROYAL

## (undated) DEVICE — SUT VICRYL PLUS 3-0 RB-1 CR/8 18 IN VCP713D

## (undated) DEVICE — PENCIL ELECTROSURG E-Z CLEAN -0035H

## (undated) DEVICE — MONITORING SPINAL IMPULSE CASE FEE

## (undated) DEVICE — DRAPE C-ARMOUR

## (undated) DEVICE — INTENDED FOR TISSUE SEPARATION, AND OTHER PROCEDURES THAT REQUIRE A SHARP SURGICAL BLADE TO PUNCTURE OR CUT.: Brand: BARD-PARKER SAFETY BLADES SIZE 15, STERILE

## (undated) DEVICE — SURGIFOAM 8.5 X 12.5

## (undated) DEVICE — INTENDED FOR TISSUE SEPARATION, AND OTHER PROCEDURES THAT REQUIRE A SHARP SURGICAL BLADE TO PUNCTURE OR CUT.: Brand: BARD-PARKER ® CARBON RIB-BACK BLADES

## (undated) DEVICE — ELECTRODE BLADE MOD E-Z CLEAN 2.5IN 6.4CM -0012M

## (undated) DEVICE — TRAY FOLEY 16FR URIMETER SURESTEP

## (undated) DEVICE — SWABSTCK, BENZOIN TINCTURE, 1/PK, STRL: Brand: APLICARE

## (undated) DEVICE — MINOR PROCEDURE DRAPE: Brand: CONVERTORS

## (undated) DEVICE — DRAPE SHEET X-LG

## (undated) DEVICE — GLOVE INDICATOR PI UNDERGLOVE SZ 8 BLUE

## (undated) DEVICE — TOOL MR8-14MH30 MR8 14CM MATCH 3MM: Brand: MIDAS REX MR8

## (undated) DEVICE — SNAP KOVER: Brand: UNBRANDED

## (undated) DEVICE — DRESSING MEPORE FILM ADHESIVE 4 X 5IN

## (undated) DEVICE — BETHLEHEM UNIVERSAL SPINE, KIT: Brand: CARDINAL HEALTH

## (undated) DEVICE — PREP SURGICAL PURPREP 26ML

## (undated) DEVICE — DRAPE SURGIKIT SADDLE BAG

## (undated) DEVICE — SKN PRP WNG SPNGE PVP SCRB STR: Brand: MEDLINE INDUSTRIES, INC.

## (undated) DEVICE — NEURO PATTIES 1/2 X 3

## (undated) DEVICE — 3M™ TEGADERM™ CHG DRESSING 25/CARTON 4 CARTONS/CASE 1659: Brand: TEGADERM™